# Patient Record
Sex: FEMALE | Race: ASIAN | NOT HISPANIC OR LATINO | Employment: UNEMPLOYED | URBAN - METROPOLITAN AREA
[De-identification: names, ages, dates, MRNs, and addresses within clinical notes are randomized per-mention and may not be internally consistent; named-entity substitution may affect disease eponyms.]

---

## 2017-01-06 ENCOUNTER — PRENATAL OFFICE VISIT - HEALTHEAST (OUTPATIENT)
Dept: FAMILY MEDICINE | Facility: CLINIC | Age: 22
End: 2017-01-06

## 2017-01-06 DIAGNOSIS — E05.90 HYPERTHYROIDISM AFFECTING PREGNANCY: ICD-10-CM

## 2017-01-06 DIAGNOSIS — Z34.92 SUPERVISION OF NORMAL PREGNANCY IN SECOND TRIMESTER: ICD-10-CM

## 2017-01-06 DIAGNOSIS — O99.280 HYPERTHYROIDISM AFFECTING PREGNANCY: ICD-10-CM

## 2017-01-12 ENCOUNTER — OFFICE VISIT - HEALTHEAST (OUTPATIENT)
Dept: ENDOCRINOLOGY | Facility: CLINIC | Age: 22
End: 2017-01-12

## 2017-01-12 DIAGNOSIS — E05.90 HYPERTHYROIDISM: ICD-10-CM

## 2017-01-12 ASSESSMENT — MIFFLIN-ST. JEOR: SCORE: 1098.32

## 2017-01-16 ENCOUNTER — AMBULATORY - HEALTHEAST (OUTPATIENT)
Dept: ENDOCRINOLOGY | Facility: CLINIC | Age: 22
End: 2017-01-16

## 2017-01-16 DIAGNOSIS — E05.90 HYPERTHYROIDISM: ICD-10-CM

## 2017-01-27 ENCOUNTER — HOSPITAL ENCOUNTER (OUTPATIENT)
Dept: ULTRASOUND IMAGING | Facility: HOSPITAL | Age: 22
Discharge: HOME | End: 2017-01-27
Attending: FAMILY MEDICINE

## 2017-01-27 DIAGNOSIS — Z34.92 SUPERVISION OF NORMAL PREGNANCY IN SECOND TRIMESTER: ICD-10-CM

## 2017-01-29 ENCOUNTER — COMMUNICATION - HEALTHEAST (OUTPATIENT)
Dept: FAMILY MEDICINE | Facility: CLINIC | Age: 22
End: 2017-01-29

## 2017-01-29 DIAGNOSIS — E05.90 HYPERTHYROIDISM AFFECTING PREGNANCY: ICD-10-CM

## 2017-01-29 DIAGNOSIS — O99.280 HYPERTHYROIDISM AFFECTING PREGNANCY: ICD-10-CM

## 2017-01-29 DIAGNOSIS — O36.8990 FETAL PERICARDIAL EFFUSION AFFECTING MANAGEMENT OF MOTHER: ICD-10-CM

## 2017-02-01 ENCOUNTER — COMMUNICATION - HEALTHEAST (OUTPATIENT)
Dept: FAMILY MEDICINE | Facility: CLINIC | Age: 22
End: 2017-02-01

## 2017-02-01 DIAGNOSIS — E05.90 HYPERTHYROIDISM: ICD-10-CM

## 2017-02-10 ENCOUNTER — PRENATAL OFFICE VISIT - HEALTHEAST (OUTPATIENT)
Dept: FAMILY MEDICINE | Facility: CLINIC | Age: 22
End: 2017-02-10

## 2017-02-10 ENCOUNTER — AMBULATORY - HEALTHEAST (OUTPATIENT)
Dept: LAB | Facility: CLINIC | Age: 22
End: 2017-02-10

## 2017-02-10 DIAGNOSIS — O99.280 HYPERTHYROIDISM AFFECTING PREGNANCY: ICD-10-CM

## 2017-02-10 DIAGNOSIS — E05.90 HYPERTHYROIDISM: ICD-10-CM

## 2017-02-10 DIAGNOSIS — Z34.92 SUPERVISION OF NORMAL PREGNANCY IN SECOND TRIMESTER: ICD-10-CM

## 2017-02-10 DIAGNOSIS — R12 HEART BURN: ICD-10-CM

## 2017-02-10 DIAGNOSIS — E05.90 HYPERTHYROIDISM AFFECTING PREGNANCY: ICD-10-CM

## 2017-02-14 ENCOUNTER — COMMUNICATION - HEALTHEAST (OUTPATIENT)
Dept: ENDOCRINOLOGY | Facility: CLINIC | Age: 22
End: 2017-02-14

## 2017-02-15 ENCOUNTER — COMMUNICATION - HEALTHEAST (OUTPATIENT)
Dept: FAMILY MEDICINE | Facility: CLINIC | Age: 22
End: 2017-02-15

## 2017-02-15 ENCOUNTER — RECORDS - HEALTHEAST (OUTPATIENT)
Dept: ADMINISTRATIVE | Facility: OTHER | Age: 22
End: 2017-02-15

## 2017-03-07 ENCOUNTER — COMMUNICATION - HEALTHEAST (OUTPATIENT)
Dept: FAMILY MEDICINE | Facility: CLINIC | Age: 22
End: 2017-03-07

## 2017-03-07 ENCOUNTER — PRENATAL OFFICE VISIT - HEALTHEAST (OUTPATIENT)
Dept: FAMILY MEDICINE | Facility: CLINIC | Age: 22
End: 2017-03-07

## 2017-03-07 DIAGNOSIS — E05.90 HYPERTHYROIDISM: ICD-10-CM

## 2017-03-07 DIAGNOSIS — Z34.92 SUPERVISION OF NORMAL PREGNANCY IN SECOND TRIMESTER: ICD-10-CM

## 2017-03-14 ENCOUNTER — COMMUNICATION - HEALTHEAST (OUTPATIENT)
Dept: FAMILY MEDICINE | Facility: CLINIC | Age: 22
End: 2017-03-14

## 2017-04-07 ENCOUNTER — PRENATAL OFFICE VISIT - HEALTHEAST (OUTPATIENT)
Dept: FAMILY MEDICINE | Facility: CLINIC | Age: 22
End: 2017-04-07

## 2017-04-07 DIAGNOSIS — Z34.93 SUPERVISION OF NORMAL PREGNANCY IN THIRD TRIMESTER: ICD-10-CM

## 2017-04-07 DIAGNOSIS — E05.90 HYPERTHYROIDISM: ICD-10-CM

## 2017-04-07 DIAGNOSIS — R12 HEART BURN: ICD-10-CM

## 2017-04-07 DIAGNOSIS — O99.280 HYPERTHYROIDISM AFFECTING PREGNANCY: ICD-10-CM

## 2017-04-07 DIAGNOSIS — E05.90 HYPERTHYROIDISM AFFECTING PREGNANCY: ICD-10-CM

## 2017-04-10 LAB — SYPHILIS RPR SCREEN - HISTORICAL: NORMAL

## 2017-04-12 ENCOUNTER — COMMUNICATION - HEALTHEAST (OUTPATIENT)
Dept: ENDOCRINOLOGY | Facility: CLINIC | Age: 22
End: 2017-04-12

## 2017-04-18 ENCOUNTER — AMBULATORY - HEALTHEAST (OUTPATIENT)
Dept: LAB | Facility: CLINIC | Age: 22
End: 2017-04-18

## 2017-04-18 ENCOUNTER — COMMUNICATION - HEALTHEAST (OUTPATIENT)
Dept: FAMILY MEDICINE | Facility: CLINIC | Age: 22
End: 2017-04-18

## 2017-04-18 DIAGNOSIS — Z34.93 SUPERVISION OF NORMAL PREGNANCY IN THIRD TRIMESTER: ICD-10-CM

## 2017-05-15 ENCOUNTER — COMMUNICATION - HEALTHEAST (OUTPATIENT)
Dept: FAMILY MEDICINE | Facility: CLINIC | Age: 22
End: 2017-05-15

## 2017-05-20 ENCOUNTER — HOSPITAL ENCOUNTER (OUTPATIENT)
Dept: OBGYN | Facility: HOSPITAL | Age: 22
Discharge: HOME | End: 2017-05-21
Attending: FAMILY MEDICINE | Admitting: FAMILY MEDICINE

## 2017-05-20 ENCOUNTER — COMMUNICATION - HEALTHEAST (OUTPATIENT)
Dept: SCHEDULING | Facility: CLINIC | Age: 22
End: 2017-05-20

## 2017-05-20 ASSESSMENT — MIFFLIN-ST. JEOR
SCORE: 1135.52
SCORE: 1135.52

## 2017-05-21 LAB — SYPHILIS RPR SCREEN - HISTORICAL: NORMAL

## 2017-06-29 ENCOUNTER — OFFICE VISIT - HEALTHEAST (OUTPATIENT)
Dept: ENDOCRINOLOGY | Facility: CLINIC | Age: 22
End: 2017-06-29

## 2017-06-29 DIAGNOSIS — E05.90 HYPERTHYROIDISM: ICD-10-CM

## 2017-06-29 ASSESSMENT — MIFFLIN-ST. JEOR: SCORE: 1111.93

## 2017-07-05 ENCOUNTER — COMMUNICATION - HEALTHEAST (OUTPATIENT)
Dept: ENDOCRINOLOGY | Facility: CLINIC | Age: 22
End: 2017-07-05

## 2017-07-05 DIAGNOSIS — E05.90 HYPERTHYROIDISM: ICD-10-CM

## 2017-07-10 ENCOUNTER — COMMUNICATION - HEALTHEAST (OUTPATIENT)
Dept: ENDOCRINOLOGY | Facility: CLINIC | Age: 22
End: 2017-07-10

## 2017-07-10 DIAGNOSIS — E05.90 HYPERTHYROIDISM: ICD-10-CM

## 2017-09-26 ENCOUNTER — COMMUNICATION - HEALTHEAST (OUTPATIENT)
Dept: ENDOCRINOLOGY | Facility: CLINIC | Age: 22
End: 2017-09-26

## 2017-09-26 DIAGNOSIS — E05.90 HYPERTHYROIDISM: ICD-10-CM

## 2017-10-06 ENCOUNTER — OFFICE VISIT - HEALTHEAST (OUTPATIENT)
Dept: FAMILY MEDICINE | Facility: CLINIC | Age: 22
End: 2017-10-06

## 2017-10-06 ENCOUNTER — COMMUNICATION - HEALTHEAST (OUTPATIENT)
Dept: FAMILY MEDICINE | Facility: CLINIC | Age: 22
End: 2017-10-06

## 2017-10-06 ENCOUNTER — AMBULATORY - HEALTHEAST (OUTPATIENT)
Dept: FAMILY MEDICINE | Facility: CLINIC | Age: 22
End: 2017-10-06

## 2017-10-06 DIAGNOSIS — Z30.017 NEXPLANON INSERTION: ICD-10-CM

## 2017-10-06 DIAGNOSIS — E05.00 GRAVES DISEASE: ICD-10-CM

## 2017-12-22 ENCOUNTER — COMMUNICATION - HEALTHEAST (OUTPATIENT)
Dept: FAMILY MEDICINE | Facility: CLINIC | Age: 22
End: 2017-12-22

## 2017-12-22 ENCOUNTER — AMBULATORY - HEALTHEAST (OUTPATIENT)
Dept: LAB | Facility: CLINIC | Age: 22
End: 2017-12-22

## 2017-12-22 DIAGNOSIS — E05.90 HYPERTHYROIDISM: ICD-10-CM

## 2017-12-22 DIAGNOSIS — E05.00 GRAVES DISEASE: ICD-10-CM

## 2017-12-22 DIAGNOSIS — R00.0 TACHYCARDIA: ICD-10-CM

## 2017-12-26 ENCOUNTER — COMMUNICATION - HEALTHEAST (OUTPATIENT)
Dept: ENDOCRINOLOGY | Facility: CLINIC | Age: 22
End: 2017-12-26

## 2017-12-26 DIAGNOSIS — E05.90 HYPERTHYROIDISM: ICD-10-CM

## 2018-02-16 ENCOUNTER — AMBULATORY - HEALTHEAST (OUTPATIENT)
Dept: ENDOCRINOLOGY | Facility: CLINIC | Age: 23
End: 2018-02-16

## 2018-02-16 ENCOUNTER — OFFICE VISIT - HEALTHEAST (OUTPATIENT)
Dept: ENDOCRINOLOGY | Facility: CLINIC | Age: 23
End: 2018-02-16

## 2018-02-16 DIAGNOSIS — E05.00 GRAVES DISEASE: ICD-10-CM

## 2018-02-16 DIAGNOSIS — E05.90 HYPERTHYROIDISM: ICD-10-CM

## 2018-02-16 LAB
T3 SERPL-MCNC: 236 NG/DL (ref 45–175)
T4 FREE SERPL-MCNC: 1.2 NG/DL (ref 0.7–1.8)
TSH SERPL DL<=0.005 MIU/L-ACNC: <0.01 UIU/ML (ref 0.3–5)

## 2018-02-16 ASSESSMENT — MIFFLIN-ST. JEOR: SCORE: 1097.87

## 2018-02-19 ENCOUNTER — AMBULATORY - HEALTHEAST (OUTPATIENT)
Dept: ENDOCRINOLOGY | Facility: CLINIC | Age: 23
End: 2018-02-19

## 2018-02-19 DIAGNOSIS — E05.00 GRAVES DISEASE: ICD-10-CM

## 2018-02-21 ENCOUNTER — COMMUNICATION - HEALTHEAST (OUTPATIENT)
Dept: ENDOCRINOLOGY | Facility: CLINIC | Age: 23
End: 2018-02-21

## 2018-03-01 ENCOUNTER — COMMUNICATION - HEALTHEAST (OUTPATIENT)
Dept: ENDOCRINOLOGY | Facility: CLINIC | Age: 23
End: 2018-03-01

## 2018-03-10 ENCOUNTER — COMMUNICATION - HEALTHEAST (OUTPATIENT)
Dept: FAMILY MEDICINE | Facility: CLINIC | Age: 23
End: 2018-03-10

## 2018-03-10 DIAGNOSIS — E05.00 GRAVES DISEASE: ICD-10-CM

## 2018-03-10 DIAGNOSIS — E05.90 HYPERTHYROIDISM: ICD-10-CM

## 2018-03-15 ENCOUNTER — OFFICE VISIT - HEALTHEAST (OUTPATIENT)
Dept: SURGERY | Facility: CLINIC | Age: 23
End: 2018-03-15

## 2018-03-15 DIAGNOSIS — E05.90 HYPERTHYROIDISM: ICD-10-CM

## 2018-03-15 ASSESSMENT — MIFFLIN-ST. JEOR: SCORE: 1119.64

## 2018-04-17 ENCOUNTER — COMMUNICATION - HEALTHEAST (OUTPATIENT)
Dept: FAMILY MEDICINE | Facility: CLINIC | Age: 23
End: 2018-04-17

## 2018-04-17 DIAGNOSIS — E05.90 HYPERTHYROIDISM: ICD-10-CM

## 2018-05-29 ENCOUNTER — COMMUNICATION - HEALTHEAST (OUTPATIENT)
Dept: FAMILY MEDICINE | Facility: CLINIC | Age: 23
End: 2018-05-29

## 2018-05-29 DIAGNOSIS — E05.90 HYPERTHYROIDISM: ICD-10-CM

## 2018-08-09 ENCOUNTER — COMMUNICATION - HEALTHEAST (OUTPATIENT)
Dept: ENDOCRINOLOGY | Facility: CLINIC | Age: 23
End: 2018-08-09

## 2018-08-16 ENCOUNTER — OFFICE VISIT - HEALTHEAST (OUTPATIENT)
Dept: ENDOCRINOLOGY | Facility: CLINIC | Age: 23
End: 2018-08-16

## 2018-08-16 ENCOUNTER — AMBULATORY - HEALTHEAST (OUTPATIENT)
Dept: ENDOCRINOLOGY | Facility: CLINIC | Age: 23
End: 2018-08-16

## 2018-08-16 DIAGNOSIS — E05.90 HYPERTHYROIDISM: ICD-10-CM

## 2018-08-16 LAB
T3 SERPL-MCNC: 208 NG/DL (ref 45–175)
T4 FREE SERPL-MCNC: 0.6 NG/DL (ref 0.7–1.8)
TSH SERPL DL<=0.005 MIU/L-ACNC: 0.05 UIU/ML (ref 0.3–5)

## 2018-08-16 ASSESSMENT — MIFFLIN-ST. JEOR: SCORE: 1140.51

## 2018-08-20 ENCOUNTER — COMMUNICATION - HEALTHEAST (OUTPATIENT)
Dept: ENDOCRINOLOGY | Facility: CLINIC | Age: 23
End: 2018-08-20

## 2018-08-20 DIAGNOSIS — E05.90 HYPERTHYROIDISM: ICD-10-CM

## 2018-08-28 ENCOUNTER — OFFICE VISIT - HEALTHEAST (OUTPATIENT)
Dept: SURGERY | Facility: CLINIC | Age: 23
End: 2018-08-28

## 2018-08-28 ENCOUNTER — AMBULATORY - HEALTHEAST (OUTPATIENT)
Dept: LAB | Facility: CLINIC | Age: 23
End: 2018-08-28

## 2018-08-28 DIAGNOSIS — E05.90 HYPERTHYROIDISM: ICD-10-CM

## 2018-08-28 LAB
CREAT SERPL-MCNC: 0.55 MG/DL (ref 0.6–1.1)
GFR SERPL CREATININE-BSD FRML MDRD: >60 ML/MIN/1.73M2
POTASSIUM BLD-SCNC: 4.3 MMOL/L (ref 3.5–5)
TSH SERPL DL<=0.005 MIU/L-ACNC: <0.01 UIU/ML (ref 0.3–5)

## 2018-08-28 ASSESSMENT — MIFFLIN-ST. JEOR: SCORE: 1135.52

## 2018-09-04 ENCOUNTER — COMMUNICATION - HEALTHEAST (OUTPATIENT)
Dept: ENDOCRINOLOGY | Facility: CLINIC | Age: 23
End: 2018-09-04

## 2018-09-20 ENCOUNTER — COMMUNICATION - HEALTHEAST (OUTPATIENT)
Dept: SURGERY | Facility: CLINIC | Age: 23
End: 2018-09-20

## 2018-12-10 ENCOUNTER — COMMUNICATION - HEALTHEAST (OUTPATIENT)
Dept: FAMILY MEDICINE | Facility: CLINIC | Age: 23
End: 2018-12-10

## 2018-12-10 DIAGNOSIS — E05.90 HYPERTHYROIDISM: ICD-10-CM

## 2019-01-17 ENCOUNTER — AMBULATORY - HEALTHEAST (OUTPATIENT)
Dept: ENDOCRINOLOGY | Facility: CLINIC | Age: 24
End: 2019-01-17

## 2019-01-17 DIAGNOSIS — E05.90 HYPERTHYROIDISM: ICD-10-CM

## 2019-02-08 ENCOUNTER — AMBULATORY - HEALTHEAST (OUTPATIENT)
Dept: LAB | Facility: CLINIC | Age: 24
End: 2019-02-08

## 2019-02-08 DIAGNOSIS — E05.90 HYPERTHYROIDISM: ICD-10-CM

## 2019-02-08 LAB
T3 SERPL-MCNC: 364 NG/DL (ref 45–175)
T4 FREE SERPL-MCNC: 2 NG/DL (ref 0.7–1.8)
TSH SERPL DL<=0.005 MIU/L-ACNC: <0.01 UIU/ML (ref 0.3–5)

## 2019-02-18 ENCOUNTER — OFFICE VISIT - HEALTHEAST (OUTPATIENT)
Dept: ENDOCRINOLOGY | Facility: CLINIC | Age: 24
End: 2019-02-18

## 2019-02-18 DIAGNOSIS — E05.90 HYPERTHYROIDISM: ICD-10-CM

## 2019-02-18 LAB
HCG UR QL: NEGATIVE
SP GR UR STRIP: 1.01 (ref 1–1.03)

## 2019-02-18 ASSESSMENT — MIFFLIN-ST. JEOR: SCORE: 1116.47

## 2019-02-27 ENCOUNTER — COMMUNICATION - HEALTHEAST (OUTPATIENT)
Dept: ENDOCRINOLOGY | Facility: CLINIC | Age: 24
End: 2019-02-27

## 2019-03-15 ENCOUNTER — COMMUNICATION - HEALTHEAST (OUTPATIENT)
Dept: TELEHEALTH | Facility: CLINIC | Age: 24
End: 2019-03-15

## 2019-03-18 ENCOUNTER — COMMUNICATION - HEALTHEAST (OUTPATIENT)
Dept: TELEHEALTH | Facility: CLINIC | Age: 24
End: 2019-03-18

## 2019-03-18 ENCOUNTER — COMMUNICATION - HEALTHEAST (OUTPATIENT)
Dept: ENDOCRINOLOGY | Facility: CLINIC | Age: 24
End: 2019-03-18

## 2019-04-03 ENCOUNTER — HOSPITAL ENCOUNTER (OUTPATIENT)
Dept: NUCLEAR MEDICINE | Facility: HOSPITAL | Age: 24
Discharge: HOME | End: 2019-04-03
Attending: INTERNAL MEDICINE

## 2019-04-03 DIAGNOSIS — E05.90 HYPERTHYROIDISM: ICD-10-CM

## 2019-04-03 ASSESSMENT — MIFFLIN-ST. JEOR: SCORE: 1117.37

## 2019-04-04 ENCOUNTER — AMBULATORY - HEALTHEAST (OUTPATIENT)
Dept: ENDOCRINOLOGY | Facility: CLINIC | Age: 24
End: 2019-04-04

## 2019-04-04 ENCOUNTER — HOSPITAL ENCOUNTER (OUTPATIENT)
Dept: NUCLEAR MEDICINE | Facility: HOSPITAL | Age: 24
Discharge: HOME | End: 2019-04-04
Attending: INTERNAL MEDICINE

## 2019-04-04 DIAGNOSIS — E05.90 HYPERTHYROIDISM: ICD-10-CM

## 2019-04-05 ENCOUNTER — AMBULATORY - HEALTHEAST (OUTPATIENT)
Dept: LAB | Facility: HOSPITAL | Age: 24
End: 2019-04-05

## 2019-04-05 ENCOUNTER — HOSPITAL ENCOUNTER (OUTPATIENT)
Dept: NUCLEAR MEDICINE | Facility: HOSPITAL | Age: 24
Discharge: HOME | End: 2019-04-05
Attending: INTERNAL MEDICINE

## 2019-04-05 DIAGNOSIS — E05.90 HYPERTHYROIDISM: ICD-10-CM

## 2019-04-05 LAB — HCG SERPL QL: NEGATIVE

## 2019-04-08 ENCOUNTER — AMBULATORY - HEALTHEAST (OUTPATIENT)
Dept: ENDOCRINOLOGY | Facility: CLINIC | Age: 24
End: 2019-04-08

## 2019-04-08 DIAGNOSIS — E05.90 HYPERTHYROIDISM: ICD-10-CM

## 2019-04-17 ENCOUNTER — COMMUNICATION - HEALTHEAST (OUTPATIENT)
Dept: ENDOCRINOLOGY | Facility: CLINIC | Age: 24
End: 2019-04-17

## 2019-05-01 ENCOUNTER — HOSPITAL ENCOUNTER (OUTPATIENT)
Dept: LAB | Age: 24
Setting detail: SPECIMEN
Discharge: HOME | End: 2019-05-01

## 2019-05-01 ENCOUNTER — AMBULATORY - HEALTHEAST (OUTPATIENT)
Dept: LAB | Facility: CLINIC | Age: 24
End: 2019-05-01

## 2019-05-01 DIAGNOSIS — E05.90 HYPERTHYROIDISM: ICD-10-CM

## 2019-05-01 LAB — T4 FREE SERPL-MCNC: 2.9 NG/DL (ref 0.7–1.8)

## 2019-05-02 ENCOUNTER — COMMUNICATION - HEALTHEAST (OUTPATIENT)
Dept: ENDOCRINOLOGY | Facility: CLINIC | Age: 24
End: 2019-05-02

## 2019-05-02 DIAGNOSIS — E05.90 HYPERTHYROIDISM: ICD-10-CM

## 2019-05-02 RX ORDER — METHIMAZOLE 10 MG/1
30 TABLET ORAL DAILY
Qty: 270 TABLET | Refills: 0 | Status: SHIPPED | OUTPATIENT
Start: 2019-05-02 | End: 2021-11-18

## 2019-07-26 ENCOUNTER — COMMUNICATION - HEALTHEAST (OUTPATIENT)
Dept: ENDOCRINOLOGY | Facility: CLINIC | Age: 24
End: 2019-07-26

## 2019-08-05 ENCOUNTER — COMMUNICATION - HEALTHEAST (OUTPATIENT)
Dept: FAMILY MEDICINE | Facility: CLINIC | Age: 24
End: 2019-08-05

## 2020-09-16 ENCOUNTER — OFFICE VISIT - HEALTHEAST (OUTPATIENT)
Dept: FAMILY MEDICINE | Facility: CLINIC | Age: 25
End: 2020-09-16

## 2020-09-16 DIAGNOSIS — Z30.46 NEXPLANON REMOVAL: ICD-10-CM

## 2020-09-16 DIAGNOSIS — E05.00 GRAVES DISEASE: ICD-10-CM

## 2020-09-16 LAB
ALBUMIN SERPL-MCNC: 3.7 G/DL (ref 3.5–5)
ALP SERPL-CCNC: 201 U/L (ref 45–120)
ALT SERPL W P-5'-P-CCNC: 17 U/L (ref 0–45)
ANION GAP SERPL CALCULATED.3IONS-SCNC: 7 MMOL/L (ref 5–18)
AST SERPL W P-5'-P-CCNC: 18 U/L (ref 0–40)
BILIRUB SERPL-MCNC: 0.6 MG/DL (ref 0–1)
BUN SERPL-MCNC: 7 MG/DL (ref 8–22)
CALCIUM SERPL-MCNC: 9 MG/DL (ref 8.5–10.5)
CHLORIDE BLD-SCNC: 111 MMOL/L (ref 98–107)
CO2 SERPL-SCNC: 24 MMOL/L (ref 22–31)
CREAT SERPL-MCNC: 0.47 MG/DL (ref 0.6–1.1)
ERYTHROCYTE [DISTWIDTH] IN BLOOD BY AUTOMATED COUNT: 10.6 % (ref 11–14.5)
GFR SERPL CREATININE-BSD FRML MDRD: >60 ML/MIN/1.73M2
GLUCOSE BLD-MCNC: 101 MG/DL (ref 70–125)
HCT VFR BLD AUTO: 36.3 % (ref 35–47)
HGB BLD-MCNC: 11.8 G/DL (ref 12–16)
MCH RBC QN AUTO: 26.8 PG (ref 27–34)
MCHC RBC AUTO-ENTMCNC: 32.4 G/DL (ref 32–36)
MCV RBC AUTO: 83 FL (ref 80–100)
PLATELET # BLD AUTO: 168 THOU/UL (ref 140–440)
PMV BLD AUTO: 7.8 FL (ref 7–10)
POTASSIUM BLD-SCNC: 3.6 MMOL/L (ref 3.5–5)
PROT SERPL-MCNC: 6.5 G/DL (ref 6–8)
RBC # BLD AUTO: 4.39 MILL/UL (ref 3.8–5.4)
SODIUM SERPL-SCNC: 142 MMOL/L (ref 136–145)
T4 FREE SERPL-MCNC: 2.6 NG/DL (ref 0.7–1.8)
TSH SERPL DL<=0.005 MIU/L-ACNC: <0.01 UIU/ML (ref 0.3–5)
WBC: 4.2 THOU/UL (ref 4–11)

## 2020-09-16 RX ORDER — METOPROLOL SUCCINATE 25 MG/1
25 TABLET, EXTENDED RELEASE ORAL DAILY
Qty: 60 TABLET | Refills: 0 | Status: SHIPPED | OUTPATIENT
Start: 2020-09-16 | End: 2022-01-05

## 2020-09-16 ASSESSMENT — MIFFLIN-ST. JEOR: SCORE: 1090.71

## 2020-09-17 RX ORDER — PRENATAL VIT/IRON FUM/FOLIC AC 27MG-0.8MG
1 TABLET ORAL DAILY
Qty: 90 TABLET | Refills: 3 | Status: SHIPPED | OUTPATIENT
Start: 2020-09-17 | End: 2021-11-16

## 2020-10-31 ENCOUNTER — COMMUNICATION - HEALTHEAST (OUTPATIENT)
Dept: FAMILY MEDICINE | Facility: CLINIC | Age: 25
End: 2020-10-31

## 2021-01-14 ENCOUNTER — TRANSCRIBE ORDERS (OUTPATIENT)
Dept: OTHER | Age: 26
End: 2021-01-14

## 2021-01-14 DIAGNOSIS — E05.00 GRAVES DISEASE: Primary | ICD-10-CM

## 2021-02-01 ENCOUNTER — COMMUNICATION - HEALTHEAST (OUTPATIENT)
Dept: FAMILY MEDICINE | Facility: CLINIC | Age: 26
End: 2021-02-01

## 2021-05-28 NOTE — TELEPHONE ENCOUNTER
----- Message from Kuldip Sharma MD sent at 5/2/2019  8:46 AM CDT -----  Please restart tapazole 30 mg daily. Check tsh ft4 tt3 in 8 weeks.

## 2021-05-30 VITALS — WEIGHT: 108.75 LBS | BODY MASS INDEX: 23.53 KG/M2

## 2021-05-30 VITALS — BODY MASS INDEX: 23.04 KG/M2 | WEIGHT: 106.8 LBS | HEIGHT: 57 IN

## 2021-05-30 VITALS — WEIGHT: 111 LBS | BODY MASS INDEX: 24.02 KG/M2

## 2021-05-30 VITALS — BODY MASS INDEX: 22.29 KG/M2 | WEIGHT: 103 LBS

## 2021-05-30 VITALS — WEIGHT: 112 LBS | BODY MASS INDEX: 24.24 KG/M2

## 2021-05-31 VITALS — WEIGHT: 115 LBS | HEIGHT: 57 IN | BODY MASS INDEX: 24.81 KG/M2

## 2021-05-31 VITALS — BODY MASS INDEX: 23.69 KG/M2 | HEIGHT: 57 IN | WEIGHT: 109.8 LBS

## 2021-05-31 VITALS — WEIGHT: 95.75 LBS | BODY MASS INDEX: 20.72 KG/M2

## 2021-06-01 VITALS — BODY MASS INDEX: 23.02 KG/M2 | WEIGHT: 106.7 LBS | HEIGHT: 57 IN

## 2021-06-01 VITALS — WEIGHT: 115 LBS | HEIGHT: 57 IN | BODY MASS INDEX: 24.81 KG/M2

## 2021-06-01 VITALS — BODY MASS INDEX: 23.9 KG/M2 | WEIGHT: 110.8 LBS | HEIGHT: 57 IN

## 2021-06-01 VITALS — BODY MASS INDEX: 25.05 KG/M2 | HEIGHT: 57 IN | WEIGHT: 116.1 LBS

## 2021-06-02 VITALS — BODY MASS INDEX: 23.9 KG/M2 | HEIGHT: 57 IN | WEIGHT: 110.8 LBS

## 2021-06-02 VITALS — HEIGHT: 57 IN | BODY MASS INDEX: 23.95 KG/M2 | WEIGHT: 111 LBS

## 2021-06-05 VITALS
SYSTOLIC BLOOD PRESSURE: 143 MMHG | HEART RATE: 125 BPM | WEIGHT: 104.25 LBS | DIASTOLIC BLOOD PRESSURE: 88 MMHG | HEIGHT: 57 IN | BODY MASS INDEX: 22.49 KG/M2

## 2021-06-08 NOTE — PROGRESS NOTES
Progress Note    Reason for Visit:  Chief Complaint     Hypothyroidism          Progress Note:    HPI:   This pleasant 21-year-old female patient is seeing physician at the request of  because of hyperthyroidism.  The patient is 7 months postpartum she has 2 children but she also currently pregnant at 18 weeks.  She was on Tapazole before 15 mg      The patient is noncompliant with her medication she discontinued that and then she became pregnant she was not taking any treatment for the first trimester and 1 month ago she restarted the Tapazole at 15 mg.    She is markedly hyperthyroid TSH undetectable 3-4 3. 6-3 she hasn't enlarged goiter 2-3 times up on limits of normal.  Vital signs blood pressure 124/60 pulse is 9099.  Review of Systems:    Nervous System: No headache, dizziness, fainting or memory loss. No tingling sensation of hand or feet.  Ears: No hearing loss or ringing in the ears  Eyes: No blurring of vision, redness, itching or dryness.  Nose: No nosebleed or loss of smell  Mouth: No mouth sores or loss of taste  Throat: No hoarseness or difficulty swallowing  Neck: No enlarged thyroid or lymph nodes.  Heart: No chest pain, palpitation or irregular heartbeat. No swelling of hands or feet  Lungs: No shortness of breath, cough, night sweats, wheezing or hemoptysis.  Gastrointestinal: No nausea or vomiting, constipation or diarrhea.  No acid reflux, abdominal pain or blood in stools.  Kidney/Bladdr: No polyuria, polydipsia, nocturia or hematuria.  Genital/Sexual: No loss of libido  Skin: No rash, hair loss or hirsutism.  No abnormal striae  Muscles/Joints/Bones: No morning stiffness, muscle aches and pain or loss of height.    Current Medications:  Current Outpatient Prescriptions   Medication Sig     atenolol (TENORMIN) 25 MG tablet Take 1 tablet (25 mg total) by mouth daily.     methIMAzole (TAPAZOLE) 5 MG tablet Take 3 tablets (15 mg total) by mouth 3 (three) times a day.     prenatal vitamin  "iron-folic acid 27mg-0.8mg (PRENATAL S) 27 mg iron- 800 mcg Tab tablet TAKE 1 TABLET BY MOUTH ONCE DAILY.       Patients Active Problems:  Patient Active Problem List   Diagnosis     Tachycardia     Hepatitis A vaccination not up to date     Hyperthyroidism     Not immune to hepatitis B virus     Susceptible to varicella (non-immune), currently pregnant     Rubella non-immune status, antepartum     Normal pregnancy     Hyperthyroidism affecting pregnancy       History:   reports that she has never smoked. She does not have any smokeless tobacco history on file. She reports that she does not drink alcohol or use illicit drugs.   reports that she has never smoked. She does not have any smokeless tobacco history on file. She reports that she does not drink alcohol or use illicit drugs.  History   Smoking Status     Never Smoker   Smokeless Tobacco     Not on file      reports that she has never smoked. She does not have any smokeless tobacco history on file. She reports that she does not drink alcohol or use illicit drugs.  History   Sexual Activity     Sexual activity: Yes     Partners: Male     Birth control/ protection: None     Past Medical History   Diagnosis Date     Hyperthyroidism 2016     Intrauterine Growth Restriction      Precipitous delivery, delivered (current hospitalization) 2016      delivery 2016     Urinary tract infection      Family History   Problem Relation Age of Onset     No Medical Problems Father      Thyroid disease Mother      Mental retardation Brother      Past Medical History   Diagnosis Date     Hyperthyroidism 2016     Intrauterine Growth Restriction      Precipitous delivery, delivered (current hospitalization) 2016      delivery 2016     Urinary tract infection      No past surgical history on file.    Vitals   height is 4' 9\" (1.448 m) and weight is 106 lb 12.8 oz (48.4 kg). Her blood pressure is 124/60.         Exam  General appearance: " The patient looked well, not in acute distress.  Eyes: no evidence of thyroid eye disease.   Retinal exam: No evidence of diabetic retinopathy.  Mouth and Throat: Normal  Neck: No evidence of thyromegaly, enlarged lymph node or tenderness  Chest: Trachea is central. Chest is clear to auscultation and percussion. Breat sounds are normal.  Cardiovascular exam: JVP is not raised. Heart sounds are normal, no murmurs or rub  Peripheral pulses are palpable.   Abdomen: No masses or tenderness.    Back: No vertebral tenderness or kyphosis.  Extremities: No evidence of leg edema.   Skin: Normal to touch.  No abnormal striae  Neurologic exam:  Visual fields are intact by confrontation, grossly intact. No evidence of peripheral neuropathy.  Detailed foot exam normal.        Diagnosis:  No diagnosis found.    Orders:   No orders of the defined types were placed in this encounter.        Assessment and Plan:  Hyperthyroidism due to pregnancy and have discussed the present dosage of the diseases the patient we will continue on atenolol 25 mg once a day.  We will increase Tapazole to, 20 mg we'll check thyroid function tests a very 4 weeks patient will return to clinic in 3 months I discussed side effects with the patient.    I did spend 45 minutes with the patient more than 50% was spent in counseling and managing her care.

## 2021-06-08 NOTE — PROGRESS NOTES
No ctx, lof, bleeding, or dc.  No HA or vision changes.  Now taking methimazole and atenolol.  Good weight gain.  Due for labs for endocrinology, today, and they were drawn.  Has appointment with [facility] next week regarding pericardial effusion of fetus.  No constipation,  Feels heart rate is better.  Denies feeling sweaty, overheated, or having diarrhea.  If anything she is more constipated.  She declined pap smear, today.

## 2021-06-08 NOTE — PROGRESS NOTES
No ctx, lof, bleeding, or dc.  No HA or vision changes.   Still tachycardic. Taking atenolol.  Taking tapazole.  Has not seen Endocrine.  Feels ok.  No ctx, lof, bleeding, or dc.  No HA or vision changes.    Hyperthyroidism affecting pregnancy  - Thyroid Stimulating Hormone (TSH)  - T4, Free    Patient notified

## 2021-06-09 NOTE — PROGRESS NOTES
"Having occasional non painful contractions.  No lof, bleeding, or dc.  No HA or vision changes.     Taking methimazole daily, 25 mg.  Has been able to cut atenolol in half, down to 12.5 mg daily.  Still feels anxious and crabby.  Still feels heart rate is fast.    Fundal height growth is less than expected.  Most recent delivery at 35 weeks of infant weighing 1.79 kg.  Mother with fairly small stature (4'9\", has gained 14 lbs since week 12 of pregnancy to 112 lb.)  Referral back to [facility] for ultrasound (growth evaluation, sonographic goiter assessment, and consult).  Discussed importance of this follow up.    Still having heart burn despite TUMS.  Will add ranitidine.    Thyroid studies today.  CBC and ferritin.  RPR.  "

## 2021-06-09 NOTE — PROGRESS NOTES
No ctx, lof, bleeding, or dc.  No HA or vision changes.   Reviewed notes and recommendations from perinatology.    Encouraged her to consider tapering off the atenolol, however heart rate is still relatively high and weight gain has not been very significant with her pregnancy.  We'll continue to evaluate the fetus for growth restriction--as well as maternal status.    She has not yet refilled the methimazole prescriptions but reports knowing that she should have increased the dose to 25 mg per day (per endocrinology).  I renewed her prescription so she doesn't run out.  Plans to have labs drawn on March 24.

## 2021-06-10 NOTE — PROGRESS NOTES
"In to see Yusuf.  She states her contractions are \"getting strong\".  EFM applied.  Plan to observe and check in at 0100.    "

## 2021-06-10 NOTE — PROGRESS NOTES
"In to see Yusuf.  She states she is not having contractions anymore and has been sleeping.  I ask her if she would feel comfortable going home and she states \"yes\".  Plan to call Cindy for discharge home order around 0600.    "

## 2021-06-10 NOTE — PROGRESS NOTES
Yusuf and I discuss her discharge instructions.  Yusuf verbalizes understanding of all discharge instructions.  Report to Wen Martínez RN who will be walking Yusuf and her  out when she is dressed and ready to go.

## 2021-06-10 NOTE — PROGRESS NOTES
"Yusuf and I discuss plan of care.  We decide to let her try and rest.  Yusuf states she feels like she could rest as \"my contractions are still the same\".  Plan to hang 0300 dose of penicillin and let her rest.  Plan to call Cindy in the morning.   "

## 2021-06-10 NOTE — PROGRESS NOTES
EFM discontinued.  Yusuf states some contractions are stronger than others but doesn't really feel a difference.  Yusuf appears comfortable and relaxed and I inform her to call me if her contractions get more consistently strong.  Will observe.

## 2021-06-11 NOTE — PROGRESS NOTES
"Subjective:  25 y.o. female with concerns of follow-up and removal of Nexplanon.  Insertion date October 2017.  Nearing the end of its life span.  Patient requests removal.  She does not want to go back on birth control at all.  She would consider another pregnancy but is worried about her Graves' disease.    Graves' disease with some symptoms.  She has not followed up with endocrinology with primary care for over a year.  She has been off methimazole.  She had been on it with alternating levels of medication adherence.      Outpatient Medications Prior to Visit   Medication Sig Dispense Refill     methIMAzole (TAPAZOLE) 10 MG tablet Take 3 tablets (30 mg total) by mouth daily. 270 tablet 0     white petrolatum-mineral oil (REFRESH LACRI-LUBE) 56.8-42.5 % Oint Apply to eyelid/eye at night  0     Facility-Administered Medications Prior to Visit   Medication Dose Route Frequency Provider Last Rate Last Dose     etonogestrel 68 mg implant 1 each (NEXPLANON)  1 each Subdermal Once [place], [facility]          Social History     Tobacco Use   Smoking Status Current Some Day Smoker     Types: Cigarettes   Smokeless Tobacco Never Used      Objective:  /88 (Patient Site: Right Arm, Patient Position: Sitting, Cuff Size: Adult Small)   Pulse (!) 125   Ht 4' 9.25\" (1.454 m)   Wt 104 lb 4 oz (47.3 kg)   LMP 09/14/2020   BMI 22.36 kg/m    GENERAL: alert, not distressed  EYE: Mild proptosis  CHEST: clear, no rales, rhonchi, or wheezes  CARDIAC: regular without murmur, gallop, or rub  ABDOMEN: soft, non tender, non distended, normal bowel sounds    Recent Results (from the past 240 hour(s))   Comprehensive Metabolic Panel    Collection Time: 09/16/20 12:47 PM   Result Value Ref Range    Sodium 142 136 - 145 mmol/L    Potassium 3.6 3.5 - 5.0 mmol/L    Chloride 111 (H) 98 - 107 mmol/L    CO2 24 22 - 31 mmol/L    Anion Gap, Calculation 7 5 - 18 mmol/L    Glucose 101 70 - 125 mg/dL    BUN 7 (L) 8 - 22 mg/dL    " Creatinine 0.47 (L) 0.60 - 1.10 mg/dL    GFR MDRD Af Amer >60 >60 mL/min/1.73m2    GFR MDRD Non Af Amer >60 >60 mL/min/1.73m2    Bilirubin, Total 0.6 0.0 - 1.0 mg/dL    Calcium 9.0 8.5 - 10.5 mg/dL    Protein, Total 6.5 6.0 - 8.0 g/dL    Albumin 3.7 3.5 - 5.0 g/dL    Alkaline Phosphatase 201 (H) 45 - 120 U/L    AST 18 0 - 40 U/L    ALT 17 0 - 45 U/L   Thyroid Stimulating Hormone (TSH)    Collection Time: 09/16/20 12:47 PM   Result Value Ref Range    TSH <0.01 (L) 0.30 - 5.00 uIU/mL   T4, Free    Collection Time: 09/16/20 12:47 PM   Result Value Ref Range    Free T4 2.6 (H) 0.7 - 1.8 ng/dL   HM2(CBC w/o Differential)    Collection Time: 09/16/20 12:47 PM   Result Value Ref Range    WBC 4.2 4.0 - 11.0 thou/uL    RBC 4.39 3.80 - 5.40 mill/uL    Hemoglobin 11.8 (L) 12.0 - 16.0 g/dL    Hematocrit 36.3 35.0 - 47.0 %    MCV 83 80 - 100 fL    MCH 26.8 (L) 27.0 - 34.0 pg    MCHC 32.4 32.0 - 36.0 g/dL    RDW 10.6 (L) 11.0 - 14.5 %    Platelets 168 140 - 440 thou/uL    MPV 7.8 7.0 - 10.0 fL      Assessment and Plan:     1. Graves disease  Symptoms currently not controlled.  Blood pressure bit high.  Recommend starting metoprolol with potential fertility will avoid drugs with iatrogenic potential.  Long when I think she is needs to follow-up with neurology due to her on and off use of methimazole and persistent parathyroidism.  - Comprehensive Metabolic Panel  - Thyroid Stimulating Hormone (TSH)  - T4, Free  - HM2(CBC w/o Differential)  - metoprolol succinate (TOPROL XL) 25 MG; Take 1 tablet (25 mg total) by mouth daily.  Dispense: 60 tablet; Refill: 0  - Ambulatory referral to Endocrinology    2. Nexplanon removal  See procedure note below.  - lidocaine 10 mg/mL (1 %) injection 2.5 mL  - prenatal vit no.130-iron-folic (PRENATAL VITAMIN) 27 mg iron- 800 mcg Tab tablet; Take 1 tablet by mouth daily.  Dispense: 90 tablet; Refill: 3     PROCEDURE NOTE:    Confirmed patient's desire for removal.  Nexplanon easily palpated in  subdermal tissues of left arm.  Risks, benefits, alternatives to removal discussed.  Alternative birth control plan discussed.  Probability of return to fertility fairly quickly was discussed.    Patient laid supine on the table with her arm behind her head.  Nexplanon cassandra was palpated and marked.  Patient was prepped and draped sterilely.  A small incision was made at the distal end of the Nexplanon.  With a little bit of manipulation it was able to be grasped with a clamp and fully removed.  The cassandra was examined and found to be intact upon removal.  The wound was closed with a bandage and wrapped with a compression wrap.    Estimated blood loss was less than 10 mL.  Patient tolerated procedure well.  Aftercare instructions were provided.

## 2021-06-11 NOTE — PROGRESS NOTES
Progress Note    Reason for Visit:  Chief Complaint     Thyroid Problem          Progress Note:    HPI:    This pleasant 21-year-old female patient is here for follow-up because of hyperthyroidism.    Component      Latest Ref Rng & Units 4/7/2017 5/23/2017 5/24/2017   T3, Total      45 - 175 ng/dL 298 (H) 275 (H)    Free T4      0.7 - 1.8 ng/dL 1.7 1.7    Hemoglobin      12.0 - 16.0 g/dL  9.7 (L) 9.5 (L)   TSH      0.30 - 5.00 uIU/mL  <0.01 (L)          Review of Systems:    Nervous System: No headache, dizziness, fainting or memory loss. No tingling sensation of hand or feet.  Ears: No hearing loss or ringing in the ears  Eyes: No blurring of vision, redness, itching or dryness.  Nose: No nosebleed or loss of smell  Mouth: No mouth sores or loss of taste  Throat: No hoarseness or difficulty swallowing  Neck: No enlarged thyroid or lymph nodes.  Heart: No chest pain, palpitation or irregular heartbeat. No swelling of hands or feet  Lungs: No shortness of breath, cough, night sweats, wheezing or hemoptysis.  Gastrointestinal: No nausea or vomiting, constipation or diarrhea.  No acid reflux, abdominal pain or blood in stools.  Kidney/Bladdr: No polyuria, polydipsia, nocturia or hematuria.  Genital/Sexual: No loss of libido  Skin: No rash, hair loss or hirsutism.  No abnormal striae  Muscles/Joints/Bones: No morning stiffness, muscle aches and pain or loss of height.    Current Medications:  Current Outpatient Prescriptions   Medication Sig     atenolol (TENORMIN) 25 MG tablet Take 0.5 tablets (12.5 mg total) by mouth daily.     methIMAzole (TAPAZOLE) 5 MG tablet Take 5 tablets (25 mg total) by mouth daily.     acetaminophen (TYLENOL EXTRA STRENGTH) 500 MG tablet Take 2 tablets (1,000 mg total) by mouth every 8 (eight) hours as needed for pain.     ranitidine (ZANTAC) 150 MG tablet Take 1 tablet (150 mg total) by mouth 2 (two) times a day.       Patients Active Problems:  Patient Active Problem List   Diagnosis      "Tachycardia     Hepatitis A vaccination not up to date     Hyperthyroidism     Not immune to hepatitis B virus     Susceptible to varicella (non-immune), currently pregnant     Hyperthyroidism affecting pregnancy     Graves disease      (normal spontaneous vaginal delivery)       History:   reports that she has never smoked. She does not have any smokeless tobacco history on file. She reports that she does not drink alcohol or use illicit drugs.   reports that she has never smoked. She does not have any smokeless tobacco history on file. She reports that she does not drink alcohol or use illicit drugs.  History   Smoking Status     Never Smoker   Smokeless Tobacco     Not on file      reports that she has never smoked. She does not have any smokeless tobacco history on file. She reports that she does not drink alcohol or use illicit drugs.  History   Sexual Activity     Sexual activity: Yes     Partners: Male     Birth control/ protection: None     Past Medical History:   Diagnosis Date     Hyperthyroidism 2016     Intrauterine Growth Restriction      Precipitous delivery, delivered (current hospitalization) 2016      delivery 2016     Urinary tract infection      Family History   Problem Relation Age of Onset     No Medical Problems Father      Thyroid disease Mother      Mental retardation Brother      Past Medical History:   Diagnosis Date     Hyperthyroidism 2016     Intrauterine Growth Restriction      Precipitous delivery, delivered (current hospitalization) 2016      delivery 2016     Urinary tract infection      No past surgical history on file.    Vitals   height is 4' 9\" (1.448 m) and weight is 109 lb 12.8 oz (49.8 kg). Her blood pressure is 122/76.         Exam  General appearance: The patient looked well, not in acute distress.  Eyes: no evidence of thyroid eye disease.   Retinal exam: No evidence of diabetic retinopathy.  Mouth and Throat: Normal  Neck: " No evidence of thyromegaly, enlarged lymph node or tenderness  Chest: Trachea is central. Chest is clear to auscultation and percussion. Breat sounds are normal.  Cardiovascular exam: JVP is not raised. Heart sounds are normal, no murmurs or rub  Peripheral pulses are palpable.   Abdomen: No masses or tenderness.    Back: No vertebral tenderness or kyphosis.  Extremities: No evidence of leg edema.   Skin: Normal to touch.  No abnormal striae  Neurologic exam:  Visual fields are intact by confrontation, grossly intact. No evidence of peripheral neuropathy.  Detailed foot exam normal.        Diagnosis:  No diagnosis found.    Orders:   No orders of the defined types were placed in this encounter.        Assessment and Plan:  Hyperthyroidism.  The patient is currently on Tapazole 25 mg once a day atenolol 25 mg.    She is one-month postpartum she had 3 children.  She has been on her thyroid and on her Tapazole for a long time she still have tachycardia larger thyroid.  TSH undetectable free T4 1.7 total T3 275.    I discussed with the patient the different options of treatment the pros and cons of each modality of treatment.    She is not very compliant.    I told her we will check her thyroid function test today but if it is still high then we will go with radioactive iodine.  Treatment I told that she cannot be in touch with her children for 3 days after the surgery or after the radioactive iodine.    She cannot become pregnant for 1 year she cannot nurse her children she is currently in a nursing exam and the patient agrees.    She has some protrusion of her eyes but no active Graves' eye disease I did advise her to take selenium over-the-counter 200 mg.    3 proceeded with radioactive iodine treatment we should we will give her prednisone 20 mg 2 days before and after the procedure.    I thought to avoid passive smoking as her  is a smoker she is not.    Patient return to clinic in 3 months I did spend 40  minutes with the patient more than 50% was spent on counseling and managing her care.

## 2021-06-13 NOTE — PROGRESS NOTES
Procedure note  Patient would like Nexplanon for birth control.  She denies any unprotected sex within the past 2 weeks.  LMP was August 2017.  Risks, benefits, possible side effects of Nexplanon were discussed with patient.  All of her questions were answered.    Procedure:  Left upper inner arm was adequately anesthetized with 2.5 cc of lidocaine with Epi.  Then, using sterile technique, Nexplanon was inserted and cassandra was deployed without difficulty.  Nexplanon cassandra was palpable subcutaneously by myself.  Insertion site was covered with a band-aid and area was wrapped with a pressure bandage.  Patient was neurovascularly intact after exam.  Proper wound aftercare was dicussed with patient.    Nexplanon insertion.  Insertion date: 10/6/2017  3 year expiration date: 10/6/2020  Consent form was reviewed with patient, signed, and will be scanned in to her chart.  Patient was notified via [employer] to use backup birth control for the next 1 week.

## 2021-06-15 PROBLEM — E05.90 HYPERTHYROIDISM AFFECTING PREGNANCY: Status: ACTIVE | Noted: 2017-01-06

## 2021-06-15 PROBLEM — O99.280 HYPERTHYROIDISM AFFECTING PREGNANCY: Status: ACTIVE | Noted: 2017-01-06

## 2021-06-16 PROBLEM — Z30.017 NEXPLANON INSERTION: Status: ACTIVE | Noted: 2017-10-08

## 2021-06-16 NOTE — PROGRESS NOTES
Progress Note    Reason for Visit:  Chief Complaint     Hyperthyroidism          Progress Note:    HPI:    Hyperthyroidism.  The this patient is here for follow-up because of hyperthyroidism.    Component      Latest Ref Rng & Units 6/29/2017 10/6/2017 12/22/2017   Pregnancy Test, Urine      Negative  Negative    Specific [place], UA      1.001 - 1.030  1.030    TSH      0.30 - 5.00 uIU/mL <0.01 (L)  <0.01 (L)   Free T4      0.7 - 1.8 ng/dL 3.0 (H)  3.2 (H)   T3, Total      45 - 175 ng/dL 455 (H)  676 (H)       Review of Systems:    Nervous System: No headache, dizziness, fainting or memory loss. No tingling sensation of hand or feet.  Ears: No hearing loss or ringing in the ears  Eyes: No blurring of vision, redness, itching or dryness.  Nose: No nosebleed or loss of smell  Mouth: No mouth sores or loss of taste  Throat: No hoarseness or difficulty swallowing  Neck: No enlarged thyroid or lymph nodes.  Heart: No chest pain, palpitation or irregular heartbeat. No swelling of hands or feet  Lungs: No shortness of breath, cough, night sweats, wheezing or hemoptysis.  Gastrointestinal: No nausea or vomiting, constipation or diarrhea.  No acid reflux, abdominal pain or blood in stools.  Kidney/Bladdr: No polyuria, polydipsia, nocturia or hematuria.  Genital/Sexual: No loss of libido  Skin: No rash, hair loss or hirsutism.  No abnormal striae  Muscles/Joints/Bones: No morning stiffness, muscle aches and pain or loss of height.    Current Medications:  Current Outpatient Prescriptions   Medication Sig     acetaminophen (TYLENOL EXTRA STRENGTH) 500 MG tablet Take 2 tablets (1,000 mg total) by mouth every 8 (eight) hours as needed for pain.     atenolol (TENORMIN) 50 MG tablet Take 1 tablet (50 mg total) by mouth daily.     methIMAzole (TAPAZOLE) 10 MG tablet Take 3 tablets (30 mg total) by mouth daily.       Patients Active Problems:  Patient Active Problem List   Diagnosis     Tachycardia     Hepatitis A vaccination not  "up to date     Hyperthyroidism     Not immune to hepatitis B virus     Susceptible to varicella (non-immune), currently pregnant     Hyperthyroidism affecting pregnancy     Graves disease     Nexplanon insertion (10/6/17)       History:   does not have a smoking history on file. She has never used smokeless tobacco. She reports that she does not drink alcohol or use illicit drugs.   does not have a smoking history on file. She has never used smokeless tobacco. She reports that she does not drink alcohol or use illicit drugs.  History   Smoking Status     Not on file   Smokeless Tobacco     Never Used      does not have a smoking history on file. She has never used smokeless tobacco. She reports that she does not drink alcohol or use illicit drugs.  History   Sexual Activity     Sexual activity: Yes     Partners: Male     Birth control/ protection: None     Past Medical History:   Diagnosis Date     Hyperthyroidism 2016     Intrauterine Growth Restriction      Precipitous delivery, delivered (current hospitalization) 2016      delivery 2016     Urinary tract infection      Family History   Problem Relation Age of Onset     No Medical Problems Father      Thyroid disease Mother      Mental retardation Brother      Past Medical History:   Diagnosis Date     Hyperthyroidism 2016     Intrauterine Growth Restriction      Precipitous delivery, delivered (current hospitalization) 2016      delivery 2016     Urinary tract infection      No past surgical history on file.    Vitals   height is 4' 9\" (1.448 m) and weight is 106 lb 11.2 oz (48.4 kg). Her blood pressure is 114/66.         Exam  General appearance: The patient looked well, not in acute distress.  Eyes: no evidence of thyroid eye disease.   Retinal exam: No evidence of diabetic retinopathy.  Mouth and Throat: Normal  Neck: No evidence of thyromegaly, enlarged lymph node or tenderness  Chest: Trachea is central. Chest is " clear to auscultation and percussion. Breat sounds are normal.  Cardiovascular exam: JVP is not raised. Heart sounds are normal, no murmurs or rub  Peripheral pulses are palpable.   Abdomen: No masses or tenderness.    Back: No vertebral tenderness or kyphosis.  Extremities: No evidence of leg edema.   Skin: Normal to touch.  No abnormal striae  Neurologic exam:  Visual fields are intact by confrontation, grossly intact. No evidence of peripheral neuropathy.  Detailed foot exam normal.        Diagnosis:  No diagnosis found.    Orders:   No orders of the defined types were placed in this encounter.        Assessment and Plan: Hyperthyroidism with the patient has Graves' disease she is currently on Tapazole 10 mg 3 times a day the patient takes the drug on on and off basis she said she has been on it since December.    She has Graves' eye disease with protrusion of her eyes and pain and irritation but no actual conjunctivitis.    She is a secondhand smoker.    I did advise her to take selenium 200 mg daily.    I would put her on prednisone 20 mg for 1 week.    I will refer the patient to ophthalmology.    The patient is on atenolol according to her 25 mg daily she is asymptomatic.    The patient is developing a rash or eczema she said that this happened since the 2 blood from her back in December.    I did advise the patient had blood done today and to tell them that she is allergic probably to latex.    But there is a also a possibility that she is allergic to the Tapazole.  We cannot give her radioactive iodine because of her eye symptoms so I did advise her to proceed with surgery I would refer the patient to Dr. Bunny Reynoso.    She has an enlarged thyroid 3 times upper limit of normal was bilateral thyroid bruit I did advise her to continue taking her Tapazole up to surgery and atenolol and she will definitely needs to take Lugol's iodine for 2 weeks before the surgery.    Patient will return to clinic in 6 month  I did spend 40 minutes with the patient more than 50% was spent on counseling and managing her care.    Patient has 3 children 4 and 2 years and 9 month old.  She is on birth control.

## 2021-06-19 NOTE — PROGRESS NOTES
Progress Note    Reason for Visit:  Chief Complaint     Thyroid Problem          Progress Note:    HPI: This patient is here for follow-up because of hyperthyroidism due to Graves' disease.  Component      Latest Ref Rng & Units 12/22/2017 2/16/2018 7/25/2018   Free T4      0.7 - 1.8 ng/dL 3.2 (H) 1.2 0.4 (L)   T3, Total      45 - 175 ng/dL 676 (H) 236 (H)    TSH      0.30 - 5.00 uIU/mL <0.01 (L) <0.01 (L) 2.89         Review of Systems:    Nervous System: No headache, dizziness, fainting or memory loss. No tingling sensation of hand or feet.  Ears: No hearing loss or ringing in the ears  Eyes: No blurring of vision, redness, itching or dryness.  Nose: No nosebleed or loss of smell  Mouth: No mouth sores or loss of taste  Throat: No hoarseness or difficulty swallowing  Neck: No enlarged thyroid or lymph nodes.  Heart: No chest pain, palpitation or irregular heartbeat. No swelling of hands or feet  Lungs: No shortness of breath, cough, night sweats, wheezing or hemoptysis.  Gastrointestinal: No nausea or vomiting, constipation or diarrhea.  No acid reflux, abdominal pain or blood in stools.  Kidney/Bladdr: No polyuria, polydipsia, nocturia or hematuria.  Genital/Sexual: No loss of libido  Skin: No rash, hair loss or hirsutism.  No abnormal striae  Muscles/Joints/Bones: No morning stiffness, muscle aches and pain or loss of height.    Current Medications:  Current Outpatient Prescriptions   Medication Sig     methIMAzole (TAPAZOLE) 10 MG tablet TAKE 2 TABLETS(20 MG) BY MOUTH DAILY     white petrolatum-mineral oil (REFRESH LACRI-LUBE) 56.8-42.5 % Oint Apply to eyelid/eye at night       Patients Active Problems:  Patient Active Problem List   Diagnosis     Tachycardia     Hepatitis A vaccination not up to date     Hyperthyroidism     Not immune to hepatitis B virus     Susceptible to varicella (non-immune), currently pregnant     Hyperthyroidism affecting pregnancy     Graves disease     Nexplanon insertion (10/6/17)  "      History:   reports that she is a non-smoker but has been exposed to tobacco smoke. She has never used smokeless tobacco. She reports that she does not drink alcohol or use illicit drugs.   reports that she is a non-smoker but has been exposed to tobacco smoke. She has never used smokeless tobacco. She reports that she does not drink alcohol or use illicit drugs.  History   Smoking Status     Passive Smoke Exposure - Never Smoker   Smokeless Tobacco     Never Used      reports that she is a non-smoker but has been exposed to tobacco smoke. She has never used smokeless tobacco. She reports that she does not drink alcohol or use illicit drugs.  History   Sexual Activity     Sexual activity: Yes     Partners: Male     Birth control/ protection: None     Past Medical History:   Diagnosis Date     Hyperthyroidism 2016     Intrauterine Growth Restriction      Precipitous delivery, delivered (current hospitalization) 2016      delivery 2016     Urinary tract infection      Family History   Problem Relation Age of Onset     No Medical Problems Father      Thyroid disease Mother      Mental retardation Brother      Past Medical History:   Diagnosis Date     Hyperthyroidism 2016     Intrauterine Growth Restriction      Precipitous delivery, delivered (current hospitalization) 2016      delivery 2016     Urinary tract infection      No past surgical history on file.    Vitals   height is 4' 9\" (1.448 m) and weight is 116 lb 1.6 oz (52.7 kg). Her blood pressure is 104/68.         Exam  General appearance: The patient looked well, not in acute distress.  Eyes: no evidence of thyroid eye disease.   Retinal exam: No evidence of diabetic retinopathy.  Mouth and Throat: Normal  Neck: No evidence of thyromegaly, enlarged lymph node or tenderness  Chest: Trachea is central. Chest is clear to auscultation and percussion. Breat sounds are normal.  Cardiovascular exam: JVP is not raised. " Heart sounds are normal, no murmurs or rub  Peripheral pulses are palpable.   Abdomen: No masses or tenderness.    Back: No vertebral tenderness or kyphosis.  Extremities: No evidence of leg edema.   Skin: Normal to touch.  No abnormal striae  Neurologic exam:  Visual fields are intact by confrontation, grossly intact. No evidence of peripheral neuropathy.  Detailed foot exam normal.        Diagnosis:  No diagnosis found.    Orders:   No orders of the defined types were placed in this encounter.        Assessment and Plan: Patient has severe hyperthyroidism due to Graves' disease.  I kept her on Tapazole 20 mg.    Free T4 came down to 1.2-0.4 TSH 2.89.    Patient is feeling fine she has markedly enlarged with goiter.  She also has significant Graves' eye disease so is not active now.  I discussed with the patient that I do not feel is appropriate for her to have radioactive iodine.    I did refer her to surgery last time but Dr. Brenner told her her thyroid has to be controlled and it is now.    I asked her to discuss with Dr. Brenner again.    Patient already has 3 children.    We will check thyroid function test today and I did advise her to decrease Tapazole to, 10 mg once a day.  Patient will return to clinic in 6 months we will check thyroid function test every 3 month.    I have reviewed and ordered clinical lab test    I have reviewed and ordered radiology tests.    I have reviewed and ordered her medication as required.    I have reviewed her test results and advised with the performing physician.    I have reviewed the patient's old records.    I have reviewed and summarized the patient old records.    I did spend 40 minutes with the patient more than 50% was spent on counseling and managing her care.

## 2021-06-20 NOTE — PROGRESS NOTES
"HPI: Pt is here for follow up of a large goiter secondary to Graves' disease.  We had seen her initially in March, and sent her to Dr. Sharma for control of her hyperthyroidism.  He has been treating her with methimazole 20 mg per day, and on 7/25/2018 her TSH was in the normal range.  She returned to him on 8/16/2018, and he consider decreasing the dose of Tapazole but at that point her TSH was 0.05 so he put it back on 20 mg per day and asked her to follow with me to set up surgery.  She continues to be symptomatic.    Allergies, Medications, Social History, Past Medical History and Past Surgical History were reviewed and are noted in the chart.    /72 (Patient Site: Right Arm, Patient Position: Sitting, Cuff Size: Adult Small)  Pulse 69  Ht 4' 9\" (1.448 m)  Wt 115 lb (52.2 kg)  SpO2 99%  BMI 24.89 kg/m2      EXAM: This is a  23 y.o. female in no distress  GENERAL: Appears well.  Some proptosis.  NECK: Large goiter visible in the neck, approximately 9 cm in left-to-right dimension      Assessment/Plan: Yusuf Stubbs is following up for hyperthyroidism.  We had a long discussion, and the session was almost entirely consultative.  I reiterated to her that we need to have her thyroid under good control at the time of surgery, because it is dangerous to operate on her when she is in a hyperthyroid state both from the standpoint of thyroid storm and increased risk of bleeding.  I have increased her methimazole to 30 mg per day, and asked her to get a repeat TSH today to confirm that she is still hyperthyroid.  Assuming that she is still hyperthyroid, we will continue the 30 mg per day for 2 weeks and then recheck her TSH.  If it is controlled at that point, we will set her up for thyroidectomy.  I have also given her a prescription for Lugol's iodide, and instructed her to take it for 10 days prior to the operation.  I went over the risks of total thyroidectomy with her again.  I mentioned bleeding and " infection, but I concentrated on recurrent laryngeal nerve injury, and hypoparathyroidism.  She remembered our previous discussion, and understood these to be the risks.    Bunny Reynoso MD  [facility] Department of Surgery

## 2021-06-24 NOTE — PROGRESS NOTES
Progress Note    Reason for Visit:  Chief Complaint     Thyroid Problem          Progress Note:    HPI: Patient has severe hyperthyroidism due to Graves' disease.  I kept her on Tapazole 20 mg.     Free T4 came down to 1.2-0.4 TSH 2.89.     Patient is feeling fine she has markedly enlarged with goiter.  She also has significant Graves' eye disease so is not active now.  I discussed with the patient that I do not feel is appropriate for her to have radioactive iodine.     I did refer her to surgery last time but Dr. Brenner told her her thyroid has to be controlled and it is now.     I asked her to discuss with Dr. Brenner again.     Patient already has 3 children.     We will check thyroid function test today and I did advise her to decrease Tapazole to, 10 mg once a day  Component      Latest Ref Rng & Units 8/16/2018 8/28/2018 2/8/2019   Creatinine      0.60 - 1.10 mg/dL  0.55 (L)    GFR MDRD Af Amer      >60 mL/min/1.73m2  >60    GFR MDRD Non Af Amer      >60 mL/min/1.73m2  >60    TSH      0.30 - 5.00 uIU/mL 0.05 (L) <0.01 (L) <0.01 (L)   Free T4      0.7 - 1.8 ng/dL 0.6 (L)  2.0 (H)   T3, Total      45 - 175 ng/dL 208 (H)  364 (H)   Potassium      3.5 - 5.0 mmol/L  4.3        Review of Systems:    Nervous System: No headache, dizziness, fainting or memory loss. No tingling sensation of hand or feet.  Ears: No hearing loss or ringing in the ears  Eyes: No blurring of vision, redness, itching or dryness.  Nose: No nosebleed or loss of smell  Mouth: No mouth sores or loss of taste  Throat: No hoarseness or difficulty swallowing  Neck: No enlarged thyroid or lymph nodes.  Heart: No chest pain, palpitation or irregular heartbeat. No swelling of hands or feet  Lungs: No shortness of breath, cough, night sweats, wheezing or hemoptysis.  Gastrointestinal: No nausea or vomiting, constipation or diarrhea.  No acid reflux, abdominal pain or blood in stools.  Kidney/Bladdr: No polyuria, polydipsia, nocturia or  hematuria.  Genital/Sexual: No loss of libido  Skin: No rash, hair loss or hirsutism.  No abnormal striae  Muscles/Joints/Bones: No morning stiffness, muscle aches and pain or loss of height.    Current Medications:  Current Outpatient Medications   Medication Sig     methIMAzole (TAPAZOLE) 10 MG tablet Take 3 tablets (30 mg total) by mouth daily.     white petrolatum-mineral oil (REFRESH LACRI-LUBE) 56.8-42.5 % Oint Apply to eyelid/eye at night       Patients Active Problems:  Patient Active Problem List   Diagnosis     Tachycardia     Hepatitis A vaccination not up to date     Hyperthyroidism     Not immune to hepatitis B virus     Susceptible to varicella (non-immune), currently pregnant     Hyperthyroidism affecting pregnancy     Graves disease     Nexplanon insertion (10/6/17)       History:   reports that she is a non-smoker but has been exposed to tobacco smoke. she has never used smokeless tobacco. She reports that she does not drink alcohol or use drugs.   reports that she is a non-smoker but has been exposed to tobacco smoke. she has never used smokeless tobacco. She reports that she does not drink alcohol or use drugs.  Social History     Tobacco Use   Smoking Status Passive Smoke Exposure - Never Smoker   Smokeless Tobacco Never Used      reports that she is a non-smoker but has been exposed to tobacco smoke. she has never used smokeless tobacco. She reports that she does not drink alcohol or use drugs.  Social History     Substance and Sexual Activity   Sexual Activity Yes     Partners: Male     Birth control/protection: None     Past Medical History:   Diagnosis Date     Hyperthyroidism 2016     Intrauterine Growth Restriction      Precipitous delivery, delivered (current hospitalization) 2016      delivery 2016     Urinary tract infection      Family History   Problem Relation Age of Onset     No Medical Problems Father      Thyroid disease Mother      Mental retardation Brother  "     Past Medical History:   Diagnosis Date     Hyperthyroidism 2016     Intrauterine Growth Restriction      Precipitous delivery, delivered (current hospitalization) 2016      delivery 2016     Urinary tract infection      Past Surgical History:   Procedure Laterality Date     NO PAST SURGERIES         Vitals   height is 4' 9\" (1.448 m) and weight is 110 lb 12.8 oz (50.3 kg). Her blood pressure is 120/72.         Exam  General appearance: The patient looked well, not in acute distress.  Eyes: no evidence of thyroid eye disease.   Retinal exam: No evidence of diabetic retinopathy.  Mouth and Throat: Normal  Neck: No evidence of thyromegaly, enlarged lymph node or tenderness  Chest: Trachea is central. Chest is clear to auscultation and percussion. Breat sounds are normal.  Cardiovascular exam: JVP is not raised. Heart sounds are normal, no murmurs or rub  Peripheral pulses are palpable.   Abdomen: No masses or tenderness.    Back: No vertebral tenderness or kyphosis.  Extremities: No evidence of leg edema.   Skin: Normal to touch.  No abnormal striae  Neurologic exam:  Visual fields are intact by confrontation, grossly intact. No evidence of peripheral neuropathy.  Detailed foot exam normal.        Diagnosis:  No diagnosis found.    Orders:   No orders of the defined types were placed in this encounter.        Assessment and Plan: This patient is here for follow-up because of hyperthyroidism due to Graves' disease.  The patient is currently on Tapazole 30 mg her TSH is undetectable free T4 is 2 total T3 364.    I have referred the patient for surgery but she is not comfortable proceeding with surgery she does have a large thyroid.    She has 3 children the youngest is 1-year-old the oldest 5 years.    She has minimal eye protrusion but no activity to do so she does not have active Graves' eye disease.    I discussed with the patient options of switching her to be 200 mg twice a day or doing " radioactive iodine she proceeded with radioactive iodine I told her it may worsen her eyes patient said she is willing to have the risk.    She has been active thyroid eye disease we will go ahead and I advised her to stop the Tapazole we will do thyroid scan and uptake thyroid by radioactive iodine ablation for hyperthyroidism but I did advise her to take prednisone 20 mg for 3 days before and after the procedure and will be the patient I told that she cannot become pregnant at least for a year after the procedure and will check urine pregnancy test the patient will return to clinic in 6 months we will check thyroid function in 3 months.  She has enlarged thyroid 3 x 3-4 times upper limit of normal.    I have reviewed and ordered clinical lab test    I have reviewed and ordered her medication as required.    I have reviewed her test results and advised with the performing physician.    I have reviewed the patient's old records.    I have reviewed and summarized the patient old records.

## 2021-06-24 NOTE — PATIENT INSTRUCTIONS - HE
It is my pleasure seeing you in the clinic today.      Please discontinue Tapazole.  Do radioactive iodine ablation for hyperthyroidism check thyroid scan and uptake then proceed with radioactive iodine treatment.  Take prednisone 20 mg 3 days before and after the procedure.    Check TSH free T4 total T3 in 3 months follow-up in 6 months.            Thank you for allowing me to participate in your care.        We work very hard to achieve the best quality of care.  We would be very grateful if you complete any survey you receive regarding this visit, so that we continue to improve our care.

## 2021-06-26 NOTE — PROGRESS NOTES
Progress Notes by Bunny Reynoso MD at 3/15/2018  3:15 PM     Author: Bunny Reynoso MD Service: -- Author Type: Physician    Filed: 3/15/2018  3:49 PM Encounter Date: 3/15/2018 Status: Signed    : Bunny Reynoso MD (Physician)       HPI: I am consulted by Kuldip Sharma MD and Victorino White MD in this 22 y.o. female regarding hyperthyroidism.  This was initially detected during her second pregnancy 2 years ago, at which time she had weight loss, sweating and tremulousness and was found to be hypothyroid. She has been under Dr. Sharma's care since.  She has been treated with Tapazole and atenolol, but she has ophthalmic stigmata of Graves' disease and is not a candidate for radio active iodine.   Currently, she has some  symptoms of toxicity.   No some difficulty with breathing and voice changes.  But she does have difficulty swallowing and has to lower her head in order to swallow.  She has diarrhea, palpitations and sweating.  Her weight is actually increasing rather than declining.    No Known Allergies      Current Outpatient Prescriptions:   ?  atenolol (TENORMIN) 50 MG tablet, Take 1 tablet (50 mg total) by mouth daily., Disp: 90 tablet, Rfl: 2  ?  methIMAzole (TAPAZOLE) 10 MG tablet, Take 2 tablets (20 mg total) by mouth daily., Disp: 60 tablet, Rfl: 0  ?  predniSONE (DELTASONE) 20 MG tablet, Take 1 tablet daily for 1 week, Disp: 7 tablet, Rfl: 1    Current Facility-Administered Medications:   ?  etonogestrel 68 mg implant 1 each (NEXPLANON), 1 each, Subdermal, Once, Maine Ramsey PA-C    Past Medical History:   Diagnosis Date   ? Hyperthyroidism 2016   ? Intrauterine Growth Restriction    ? Precipitous delivery, delivered (current hospitalization) 2016   ?  delivery 2016   ? Urinary tract infection        History reviewed. No pertinent surgical history.    Social History     Social History   ? Marital status: Single     Spouse name: N/A   ? Number of children: N/A  "  ? Years of education: N/A     Occupational History   ? Not on file.     Social History Main Topics   ? Smoking status: Passive Smoke Exposure - Never Smoker   ? Smokeless tobacco: Never Used   ? Alcohol use No   ? Drug use: No   ? Sexual activity: Yes     Partners: Male     Birth control/ protection: None     Other Topics Concern   ? Not on file     Social History Narrative       Review of Systems - Negative except as noted in the HPI    /69 (Patient Site: Right Arm, Patient Position: Sitting, Cuff Size: Adult Regular)  Pulse 66  Ht 4' 9.2\" (1.453 m)  Wt 110 lb 12.8 oz (50.3 kg)  LMP Comment: Nexplanon  SpO2 99%  Breastfeeding? No  BMI 23.81 kg/m2  Body mass index is 23.81 kg/(m^2).    EXAM:  GENERAL: This is a thin female in no distress.  SKIN: Grossly intact  EYES: No icterus  CARDIAC: RRR w/out murmur  CHEST/LUNG: Clear  THYROID:  Bilateral enlargement (significant). Nontender.  It does give her a sensation of neck compression when I palpate the thyroid.  NECK: No cervical adenopathy.   NEURO: Alert and oriented  AFFECT: Pleasant    LABS:     Ref Range & Units 2/16/18 12:06 PM     TSH 0.30 - 5.00 uIU/mL <0.01 (L)        Ref Range & Units 2/16/18 12:06 PM     Free T4 0.7 - 1.8 ng/dL 1.2         Ref Range & Units   2/16/18 12:06 PM   12/22/17 12:05 PM   6/29/17 12:04 PM   5/23/17  9:14 AM   4/7/17  8:32 AM    T3, Total 45 - 175 ng/dL 236 (H) 676 (H) 455 (H) 275 (H) 298            IMAGES:   No results found.    Assessment/Plan: Ms. Stubbs has hyperthyroidism due to Graves' disease. We discussed antithyroid medication, radioactive iodine, and surgical therapy.  We discussed total thyroidectomy. Risks addressed included death, bleeding and infection, but I specifically concentrated on recurrent laryngeal nerve injury and hypoparathyroidism. She will need thyroid hormone replacement following the operation.  She will need to be euthyroid for the surgery, and will need Lugol's iodide preoperatively.  We will " return her to Dr. Sharma's care in order to accomplish the euthyroid state.  She understood the discussion, and wishes to proceed with surgery. We will set up the surgery once she is euthyroid.    Bunny Reynoso MD  [facility] Department of Surgery

## 2021-07-10 ENCOUNTER — HEALTH MAINTENANCE LETTER (OUTPATIENT)
Age: 26
End: 2021-07-10

## 2021-07-14 PROBLEM — O36.8990 FETAL PERICARDIAL EFFUSION AFFECTING MANAGEMENT OF MOTHER: Status: RESOLVED | Noted: 2017-01-29 | Resolved: 2017-04-07

## 2021-07-14 PROBLEM — Z34.90 PREGNANT: Status: RESOLVED | Noted: 2017-05-20 | Resolved: 2017-05-21

## 2021-09-04 ENCOUNTER — HEALTH MAINTENANCE LETTER (OUTPATIENT)
Age: 26
End: 2021-09-04

## 2021-11-02 ENCOUNTER — TELEPHONE (OUTPATIENT)
Dept: FAMILY MEDICINE | Facility: CLINIC | Age: 26
End: 2021-11-02

## 2021-11-02 NOTE — TELEPHONE ENCOUNTER
Reason for Call:  Pregnancy Confirmation needed  1st day of last menstrual cycle 9/14/21  home pregnancy test postive on 10/29/21  patient would like to be seen as soon as possible  Patient would like to be seen by her PCP Dr. Sharee Smith contact patient for next route of care    PCP: Victorino White      Can we leave a detailed message on this number? YES    Phone number patient can be reached at: Home number on file [phone] (home)      Call taken on 11/2/2021 at 8:20 AM by Shamika Dickson

## 2021-11-16 ENCOUNTER — OFFICE VISIT (OUTPATIENT)
Dept: FAMILY MEDICINE | Facility: CLINIC | Age: 26
End: 2021-11-16
Payer: COMMERCIAL

## 2021-11-16 VITALS
BODY MASS INDEX: 22.44 KG/M2 | WEIGHT: 104 LBS | HEART RATE: 69 BPM | RESPIRATION RATE: 20 BRPM | TEMPERATURE: 98.2 F | HEIGHT: 57 IN | SYSTOLIC BLOOD PRESSURE: 126 MMHG | DIASTOLIC BLOOD PRESSURE: 74 MMHG

## 2021-11-16 DIAGNOSIS — E05.00 GRAVES DISEASE: ICD-10-CM

## 2021-11-16 DIAGNOSIS — Z3A.09 9 WEEKS GESTATION OF PREGNANCY: ICD-10-CM

## 2021-11-16 DIAGNOSIS — O99.281 HYPERTHYROIDISM AFFECTING PREGNANCY IN FIRST TRIMESTER: ICD-10-CM

## 2021-11-16 DIAGNOSIS — N91.2 AMENORRHEA: Primary | ICD-10-CM

## 2021-11-16 DIAGNOSIS — E05.90 HYPERTHYROIDISM: ICD-10-CM

## 2021-11-16 DIAGNOSIS — E05.90 HYPERTHYROIDISM AFFECTING PREGNANCY IN FIRST TRIMESTER: ICD-10-CM

## 2021-11-16 PROBLEM — Z30.017 NEXPLANON INSERTION: Status: RESOLVED | Noted: 2017-10-08 | Resolved: 2021-11-16

## 2021-11-16 LAB
ABO/RH(D): NORMAL
ANTIBODY SCREEN: NEGATIVE
BASOPHILS # BLD AUTO: 0 10E3/UL (ref 0–0.2)
BASOPHILS NFR BLD AUTO: 0 %
EOSINOPHIL # BLD AUTO: 0 10E3/UL (ref 0–0.7)
EOSINOPHIL NFR BLD AUTO: 0 %
ERYTHROCYTE [DISTWIDTH] IN BLOOD BY AUTOMATED COUNT: 11.4 % (ref 10–15)
HBA1C MFR BLD: 4.8 % (ref 0–5.6)
HCG UR QL: POSITIVE
HCT VFR BLD AUTO: 39.8 % (ref 35–47)
HGB BLD-MCNC: 13.1 G/DL (ref 11.7–15.7)
HIV 1+2 AB+HIV1 P24 AG SERPL QL IA: NEGATIVE
IMM GRANULOCYTES # BLD: 0 10E3/UL
IMM GRANULOCYTES NFR BLD: 0 %
LYMPHOCYTES # BLD AUTO: 1.1 10E3/UL (ref 0.8–5.3)
LYMPHOCYTES NFR BLD AUTO: 21 %
MCH RBC QN AUTO: 26.5 PG (ref 26.5–33)
MCHC RBC AUTO-ENTMCNC: 32.9 G/DL (ref 31.5–36.5)
MCV RBC AUTO: 80 FL (ref 78–100)
MONOCYTES # BLD AUTO: 0.4 10E3/UL (ref 0–1.3)
MONOCYTES NFR BLD AUTO: 8 %
NEUTROPHILS # BLD AUTO: 3.6 10E3/UL (ref 1.6–8.3)
NEUTROPHILS NFR BLD AUTO: 71 %
PLATELET # BLD AUTO: 165 10E3/UL (ref 150–450)
RBC # BLD AUTO: 4.95 10E6/UL (ref 3.8–5.2)
SPECIMEN EXPIRATION DATE: NORMAL
T4 FREE SERPL-MCNC: 3 NG/DL (ref 0.7–1.8)
TSH SERPL DL<=0.005 MIU/L-ACNC: <0.01 UIU/ML (ref 0.3–5)
WBC # BLD AUTO: 5.1 10E3/UL (ref 4–11)

## 2021-11-16 PROCEDURE — 84443 ASSAY THYROID STIM HORMONE: CPT | Performed by: PHYSICIAN ASSISTANT

## 2021-11-16 PROCEDURE — 99213 OFFICE O/P EST LOW 20 MIN: CPT | Mod: 25 | Performed by: PHYSICIAN ASSISTANT

## 2021-11-16 PROCEDURE — 87086 URINE CULTURE/COLONY COUNT: CPT | Performed by: PHYSICIAN ASSISTANT

## 2021-11-16 PROCEDURE — 86803 HEPATITIS C AB TEST: CPT | Performed by: PHYSICIAN ASSISTANT

## 2021-11-16 PROCEDURE — 86901 BLOOD TYPING SEROLOGIC RH(D): CPT | Performed by: PHYSICIAN ASSISTANT

## 2021-11-16 PROCEDURE — 90686 IIV4 VACC NO PRSV 0.5 ML IM: CPT | Performed by: PHYSICIAN ASSISTANT

## 2021-11-16 PROCEDURE — 99207 PR PRENATAL FLOW SHEET: CPT | Performed by: PHYSICIAN ASSISTANT

## 2021-11-16 PROCEDURE — 85025 COMPLETE CBC W/AUTO DIFF WBC: CPT | Performed by: PHYSICIAN ASSISTANT

## 2021-11-16 PROCEDURE — 86762 RUBELLA ANTIBODY: CPT | Performed by: PHYSICIAN ASSISTANT

## 2021-11-16 PROCEDURE — 87340 HEPATITIS B SURFACE AG IA: CPT | Performed by: PHYSICIAN ASSISTANT

## 2021-11-16 PROCEDURE — 86900 BLOOD TYPING SEROLOGIC ABO: CPT | Performed by: PHYSICIAN ASSISTANT

## 2021-11-16 PROCEDURE — 90471 IMMUNIZATION ADMIN: CPT | Performed by: PHYSICIAN ASSISTANT

## 2021-11-16 PROCEDURE — 81025 URINE PREGNANCY TEST: CPT | Performed by: PHYSICIAN ASSISTANT

## 2021-11-16 PROCEDURE — 87389 HIV-1 AG W/HIV-1&-2 AB AG IA: CPT | Performed by: PHYSICIAN ASSISTANT

## 2021-11-16 PROCEDURE — 84439 ASSAY OF FREE THYROXINE: CPT | Performed by: PHYSICIAN ASSISTANT

## 2021-11-16 PROCEDURE — 36415 COLL VENOUS BLD VENIPUNCTURE: CPT | Performed by: PHYSICIAN ASSISTANT

## 2021-11-16 PROCEDURE — 86780 TREPONEMA PALLIDUM: CPT | Performed by: PHYSICIAN ASSISTANT

## 2021-11-16 PROCEDURE — 86850 RBC ANTIBODY SCREEN: CPT | Performed by: PHYSICIAN ASSISTANT

## 2021-11-16 PROCEDURE — 83036 HEMOGLOBIN GLYCOSYLATED A1C: CPT | Performed by: PHYSICIAN ASSISTANT

## 2021-11-16 RX ORDER — PRENATAL VIT/IRON FUM/FOLIC AC 27MG-0.8MG
1 TABLET ORAL DAILY
Qty: 90 TABLET | Refills: 3 | Status: SHIPPED | OUTPATIENT
Start: 2021-11-16 | End: 2023-03-28

## 2021-11-16 ASSESSMENT — MIFFLIN-ST. JEOR: SCORE: 1080.62

## 2021-11-16 NOTE — PATIENT INSTRUCTIONS
Pregnancy: 9 weeks    Due Date: June 21, 2022    Next visit in 4 weeks  With Dr. White  For Your First OB Visit      Someone should be calling you within 1 week to schedule your appointment.  If you would like to schedule your ultrasound yourself, call #[phone].

## 2021-11-16 NOTE — LETTER
11/16/21          To Whom It May Concern:      Yusuf Stubbs (1995) is pregnant.  Estimated Date of Delivery: Jun 21, 2022        Sincerely,    Maine Ramsey PA-C

## 2021-11-17 LAB
HBV SURFACE AG SERPL QL IA: NONREACTIVE
HCV AB SERPL QL IA: NEGATIVE
RUBV IGG SERPL QL IA: 0.75 INDEX
RUBV IGG SERPL QL IA: NORMAL
T PALLIDUM AB SER QL: NONREACTIVE

## 2021-11-18 ENCOUNTER — TELEPHONE (OUTPATIENT)
Dept: FAMILY MEDICINE | Facility: CLINIC | Age: 26
End: 2021-11-18
Payer: COMMERCIAL

## 2021-11-18 DIAGNOSIS — O99.281 HYPERTHYROIDISM AFFECTING PREGNANCY IN FIRST TRIMESTER: Primary | ICD-10-CM

## 2021-11-18 DIAGNOSIS — E05.90 HYPERTHYROIDISM AFFECTING PREGNANCY IN FIRST TRIMESTER: Primary | ICD-10-CM

## 2021-11-18 DIAGNOSIS — Z3A.09 9 WEEKS GESTATION OF PREGNANCY: ICD-10-CM

## 2021-11-18 LAB — BACTERIA UR CULT: NO GROWTH

## 2021-11-18 RX ORDER — PROPYLTHIOURACIL 50 MG/1
50 TABLET ORAL 2 TIMES DAILY
Qty: 60 TABLET | Refills: 0 | Status: SHIPPED | OUTPATIENT
Start: 2021-11-18 | End: 2022-01-05

## 2021-11-19 ENCOUNTER — APPOINTMENT (OUTPATIENT)
Dept: URGENT CARE | Facility: CLINIC | Age: 26
End: 2021-11-19
Payer: COMMERCIAL

## 2021-11-19 ENCOUNTER — MYC MEDICAL ADVICE (OUTPATIENT)
Dept: FAMILY MEDICINE | Facility: CLINIC | Age: 26
End: 2021-11-19
Payer: COMMERCIAL

## 2021-11-19 DIAGNOSIS — E05.90 HYPERTHYROIDISM: Primary | ICD-10-CM

## 2021-11-19 DIAGNOSIS — U07.1 LAB TEST POSITIVE FOR DETECTION OF COVID-19 VIRUS: Primary | ICD-10-CM

## 2021-11-19 DIAGNOSIS — O99.281 HYPERTHYROIDISM AFFECTING PREGNANCY IN FIRST TRIMESTER: ICD-10-CM

## 2021-11-19 DIAGNOSIS — U07.1 LAB TEST POSITIVE FOR DETECTION OF COVID-19 VIRUS: ICD-10-CM

## 2021-11-19 DIAGNOSIS — E05.90 HYPERTHYROIDISM AFFECTING PREGNANCY IN FIRST TRIMESTER: ICD-10-CM

## 2021-11-19 NOTE — TELEPHONE ENCOUNTER
LM for pt.  I said she is hyperthyroid and this needs to be controlled during pregnancy.  I told her prescription was sent for PTU to her pharmacy.  Also M referral made to review her hyperthyroid during pregnancy.  I discussed it is very important that she take this medicine every day during pregnancy.  [facility] should be calling her to make an appointment.  I asked her to contact us via phone or [employer] if any questions or concerns.

## 2021-11-20 DIAGNOSIS — E05.90 HYPERTHYROIDISM AFFECTING PREGNANCY IN FIRST TRIMESTER: Primary | ICD-10-CM

## 2021-11-20 DIAGNOSIS — O99.281 HYPERTHYROIDISM AFFECTING PREGNANCY IN FIRST TRIMESTER: Primary | ICD-10-CM

## 2021-11-22 NOTE — PROGRESS NOTES
"ASSESSMENT and PLAN:  1. Pregnancy Intake Appointment  Yusuf Stubbs is 26 year old and .  Patient's last menstrual period was 2021.  Estimated Date of Delivery: 2022  She will be seeing Dr. White for OB care at [facility].  Screening pregnancy lab work was drawn.  Prenatal vitamin prescribed.  Problem list and current medications reviewed and updated.  Dating ultrasound ordered.  Potential exposures/risks discussed: work environment, raw meat/seafood/deli meat, home construction, cats, caffeine.  First trimester genetic screening test was discussed with patient.    She will think about Nuchal translucency ultrasound, will let us know if she wants this done.    Follow up in 4 weeks.    2. Graves disease  3. Hyperthyroidism affecting pregnancy in first trimester  PTU started, MFM referral done.  I sent her a message about starting PTU and MFM referral.  Will check next week to make sure she picked up Rx.  - TSH  - T4, free  - propylthiouracil (PTU) 50 MG tablet; Take 1 tablet (50 mg) by mouth 2 times daily  Dispense: 60 tablet; Refill: 0    4. Covid exposure   tested positive for COVID a few days ago.  He is staying in the basement.  Patient says she is \"under quarantine\" but is here for her visit today.  She has a COVID test result pending.  She is feeling OK thus far.  Standard COVID PPE precautions taken.    HPI:  Yusuf Stubbs is a 26 year old female here for pregnancy intake.  She is .  Patient's last menstrual period was 2021.  History of hyperthyroid, not taking any medications.  Positive home pregnancy test 1 month ago.    Past Medical History:   Diagnosis Date     Endocrine disease      Hypertension      Hyperthyroidism 2016     Precipitous delivery, delivered (current hospitalization) 2016      delivery 2016     Unspecified fetal growth retardation, unspecified (weight) 2013     Urinary tract infection         Past Surgical History:   Procedure " "Laterality Date     NO PAST SURGERIES          Current Outpatient Medications   Medication     metoprolol succinate (TOPROL XL) 25 MG     Prenatal Vit-Fe Fumarate-FA (PRENATAL MULTIVITAMIN W/IRON) 27-0.8 MG tablet     propylthiouracil (PTU) 50 MG tablet     white petrolatum-mineral oil (REFRESH LACRI-LUBE) 56.8-42.5 % Oint     No current facility-administered medications for this visit.        Social History     Socioeconomic History     Marital status: Single     Spouse name: Not on file     Number of children: Not on file     Years of education: Not on file     Highest education level: Not on file   Occupational History     Not on file   Tobacco Use     Smoking status: Former Smoker     Types: Cigarettes     Smokeless tobacco: Never Used   Substance and Sexual Activity     Alcohol use: No     Drug use: No     Sexual activity: Yes     Partners: Male     Birth control/protection: None   Other Topics Concern     Not on file   Social History Narrative     Not on file     Social Determinants of Health     Financial Resource Strain: Not on file   Food Insecurity: Not on file   Transportation Needs: Not on file   Physical Activity: Not on file   Stress: Not on file   Social Connections: Not on file   Intimate Partner Violence: Not on file   Housing Stability: Not on file          OBJECTIVE:  No Known Allergies  /74 (BP Location: Left arm, Patient Position: Sitting, Cuff Size: Adult Regular)   Pulse 69   Temp 98.2  F (36.8  C) (Temporal)   Resp 20   Ht 1.448 m (4' 9\")   Wt 47.2 kg (104 lb)   LMP 09/14/2021   BMI 22.51 kg/m     Body mass index is 22.51 kg/m .     Vital signs stable as recorded above.  General: Patient is alert and oriented x 3, in no apparent distress  Remainder of physical exam deferred to patient's First OB Visit.    Office Visit on 11/16/2021   Component Date Value Ref Range Status     hCG Urine Qualitative 11/16/2021 Positive* Negative Final     Culture 11/16/2021 No Growth   Final     " Hepatitis B Surface Antigen 11/16/2021 Nonreactive  Nonreactive Final     HIV Antigen Antibody Combo 11/16/2021 Negative  Negative Final     Rubella Rosa IgG Instrument Value 11/16/2021 0.75  <0.90 Index Final     Rubella Antibody IgG 11/16/2021 No detectable antibody.  Negative Final     Treponema Antibody Total 11/16/2021 Nonreactive  Nonreactive Final     Hemoglobin A1C 11/16/2021 4.8  0.0 - 5.6 % Final     Hepatitis C Antibody 11/16/2021 Negative  Negative Final     TSH 11/16/2021 <0.01* 0.30 - 5.00 uIU/mL Final     Free T4 11/16/2021 3.00* 0.70 - 1.80 ng/dL Final     ABO/RH(D) 11/16/2021 B POS   Final     Antibody Screen 11/16/2021 Negative  Negative Final     SPECIMEN EXPIRATION DATE 11/16/2021 [id number]   Final     WBC Count 11/16/2021 5.1  4.0 - 11.0 10e3/uL Final     RBC Count 11/16/2021 4.95  3.80 - 5.20 10e6/uL Final     Hemoglobin 11/16/2021 13.1  11.7 - 15.7 g/dL Final     Hematocrit 11/16/2021 39.8  35.0 - 47.0 % Final     MCV 11/16/2021 80  78 - 100 fL Final     MCH 11/16/2021 26.5  26.5 - 33.0 pg Final     MCHC 11/16/2021 32.9  31.5 - 36.5 g/dL Final     RDW 11/16/2021 11.4  10.0 - 15.0 % Final     Platelet Count 11/16/2021 165  150 - 450 10e3/uL Final     % Neutrophils 11/16/2021 71  % Final     % Lymphocytes 11/16/2021 21  % Final     % Monocytes 11/16/2021 8  % Final     % Eosinophils 11/16/2021 0  % Final     % Basophils 11/16/2021 0  % Final     % Immature Granulocytes 11/16/2021 0  % Final     Absolute Neutrophils 11/16/2021 3.6  1.6 - 8.3 10e3/uL Final     Absolute Lymphocytes 11/16/2021 1.1  0.8 - 5.3 10e3/uL Final     Absolute Monocytes 11/16/2021 0.4  0.0 - 1.3 10e3/uL Final     Absolute Eosinophils 11/16/2021 0.0  0.0 - 0.7 10e3/uL Final     Absolute Basophils 11/16/2021 0.0  0.0 - 0.2 10e3/uL Final     Absolute Immature Granulocytes 11/16/2021 0.0  <=0.0 10e3/uL Final        Other screening pregnancy lab work is pending.    Please see OB Episode for complete details.    This dictation  uses voice recognition software, which may contain typographical errors.

## 2021-12-20 ENCOUNTER — TELEPHONE (OUTPATIENT)
Dept: FAMILY MEDICINE | Facility: CLINIC | Age: 26
End: 2021-12-20
Payer: COMMERCIAL

## 2021-12-20 NOTE — TELEPHONE ENCOUNTER
LVM #1 for pt to call back to make an appt ASAP.      Okay to relay message and please schedule pt for a same day appt with      ----- Message from Victorino White MD sent at 12/20/2021  4:30 PM CST -----  Due for prenatal care.  Please schedule ASAP

## 2021-12-21 ENCOUNTER — HOSPITAL ENCOUNTER (OUTPATIENT)
Dept: ULTRASOUND IMAGING | Facility: HOSPITAL | Age: 26
Discharge: HOME | End: 2021-12-21
Attending: FAMILY MEDICINE | Admitting: FAMILY MEDICINE
Payer: COMMERCIAL

## 2021-12-21 ENCOUNTER — HOSPITAL ENCOUNTER (EMERGENCY)
Facility: HOSPITAL | Age: 26
Discharge: HOME | End: 2021-12-21
Attending: EMERGENCY MEDICINE | Admitting: EMERGENCY MEDICINE
Payer: COMMERCIAL

## 2021-12-21 VITALS
HEART RATE: 104 BPM | RESPIRATION RATE: 24 BRPM | OXYGEN SATURATION: 100 % | SYSTOLIC BLOOD PRESSURE: 130 MMHG | WEIGHT: 104 LBS | DIASTOLIC BLOOD PRESSURE: 73 MMHG | BODY MASS INDEX: 22.51 KG/M2 | TEMPERATURE: 98.6 F

## 2021-12-21 DIAGNOSIS — N30.91 HEMORRHAGIC CYSTITIS: ICD-10-CM

## 2021-12-21 DIAGNOSIS — Z34.90 PREGNANT AND NOT YET DELIVERED, UNSPECIFIED TRIMESTER: ICD-10-CM

## 2021-12-21 DIAGNOSIS — O99.281 HYPERTHYROIDISM AFFECTING PREGNANCY IN FIRST TRIMESTER: ICD-10-CM

## 2021-12-21 DIAGNOSIS — E05.90 HYPERTHYROIDISM AFFECTING PREGNANCY IN FIRST TRIMESTER: ICD-10-CM

## 2021-12-21 DIAGNOSIS — E05.90 HYPERTHYROIDISM: ICD-10-CM

## 2021-12-21 DIAGNOSIS — R00.0 SINUS TACHYCARDIA: ICD-10-CM

## 2021-12-21 LAB
ALBUMIN SERPL-MCNC: 3.5 G/DL (ref 3.5–5)
ALBUMIN UR-MCNC: 300 MG/DL
ALP SERPL-CCNC: 115 U/L (ref 45–120)
ALT SERPL W P-5'-P-CCNC: 13 U/L (ref 0–45)
ANION GAP SERPL CALCULATED.3IONS-SCNC: 11 MMOL/L (ref 5–18)
APPEARANCE UR: ABNORMAL
AST SERPL W P-5'-P-CCNC: 18 U/L (ref 0–40)
BACTERIA #/AREA URNS HPF: ABNORMAL /HPF
BASOPHILS # BLD AUTO: 0 10E3/UL (ref 0–0.2)
BASOPHILS NFR BLD AUTO: 0 %
BILIRUB DIRECT SERPL-MCNC: 0.2 MG/DL
BILIRUB SERPL-MCNC: 0.5 MG/DL (ref 0–1)
BILIRUB UR QL STRIP: NEGATIVE
BUN SERPL-MCNC: 7 MG/DL (ref 8–22)
C REACTIVE PROTEIN LHE: <0.1 MG/DL (ref 0–0.8)
CALCIUM SERPL-MCNC: 8.7 MG/DL (ref 8.5–10.5)
CHLORIDE BLD-SCNC: 105 MMOL/L (ref 98–107)
CO2 SERPL-SCNC: 20 MMOL/L (ref 22–31)
COLOR UR AUTO: ABNORMAL
CORTIS SERPL-MCNC: 18.5 UG/DL
CREAT SERPL-MCNC: 0.46 MG/DL (ref 0.6–1.1)
EOSINOPHIL # BLD AUTO: 0 10E3/UL (ref 0–0.7)
EOSINOPHIL NFR BLD AUTO: 0 %
ERYTHROCYTE [DISTWIDTH] IN BLOOD BY AUTOMATED COUNT: 12.3 % (ref 10–15)
GFR SERPL CREATININE-BSD FRML MDRD: >90 ML/MIN/1.73M2
GLUCOSE BLD-MCNC: 108 MG/DL (ref 70–125)
GLUCOSE UR STRIP-MCNC: 30 MG/DL
HCT VFR BLD AUTO: 38.6 % (ref 35–47)
HGB BLD-MCNC: 12.9 G/DL (ref 11.7–15.7)
HGB UR QL STRIP: ABNORMAL
IMM GRANULOCYTES # BLD: 0 10E3/UL
IMM GRANULOCYTES NFR BLD: 0 %
KETONES UR STRIP-MCNC: NEGATIVE MG/DL
LACTATE SERPL-SCNC: 1.9 MMOL/L (ref 0.7–2)
LEUKOCYTE ESTERASE UR QL STRIP: ABNORMAL
LIPASE SERPL-CCNC: 9 U/L (ref 0–52)
LYMPHOCYTES # BLD AUTO: 2 10E3/UL (ref 0.8–5.3)
LYMPHOCYTES NFR BLD AUTO: 21 %
MAGNESIUM SERPL-MCNC: 1.6 MG/DL (ref 1.8–2.6)
MCH RBC QN AUTO: 26.9 PG (ref 26.5–33)
MCHC RBC AUTO-ENTMCNC: 33.4 G/DL (ref 31.5–36.5)
MCV RBC AUTO: 81 FL (ref 78–100)
MONOCYTES # BLD AUTO: 0.6 10E3/UL (ref 0–1.3)
MONOCYTES NFR BLD AUTO: 6 %
MUCOUS THREADS #/AREA URNS LPF: PRESENT /LPF
NEUTROPHILS # BLD AUTO: 7 10E3/UL (ref 1.6–8.3)
NEUTROPHILS NFR BLD AUTO: 73 %
NITRATE UR QL: NEGATIVE
NRBC # BLD AUTO: 0 10E3/UL
NRBC BLD AUTO-RTO: 0 /100
PH UR STRIP: 6 [PH] (ref 5–7)
PLATELET # BLD AUTO: 194 10E3/UL (ref 150–450)
POTASSIUM BLD-SCNC: 3.2 MMOL/L (ref 3.5–5)
PROT SERPL-MCNC: 6.9 G/DL (ref 6–8)
RBC # BLD AUTO: 4.79 10E6/UL (ref 3.8–5.2)
RBC URINE: >182 /HPF
SODIUM SERPL-SCNC: 136 MMOL/L (ref 136–145)
SP GR UR STRIP: 1.02 (ref 1–1.03)
SQUAMOUS EPITHELIAL: 21 /HPF
TSH SERPL DL<=0.005 MIU/L-ACNC: <0.01 UIU/ML (ref 0.3–5)
UROBILINOGEN UR STRIP-MCNC: <2 MG/DL
WBC # BLD AUTO: 9.6 10E3/UL (ref 4–11)
WBC URINE: >182 /HPF

## 2021-12-21 PROCEDURE — 96361 HYDRATE IV INFUSION ADD-ON: CPT

## 2021-12-21 PROCEDURE — 258N000003 HC RX IP 258 OP 636: Performed by: EMERGENCY MEDICINE

## 2021-12-21 PROCEDURE — 83605 ASSAY OF LACTIC ACID: CPT | Performed by: EMERGENCY MEDICINE

## 2021-12-21 PROCEDURE — 82533 TOTAL CORTISOL: CPT | Performed by: EMERGENCY MEDICINE

## 2021-12-21 PROCEDURE — 250N000013 HC RX MED GY IP 250 OP 250 PS 637: Performed by: EMERGENCY MEDICINE

## 2021-12-21 PROCEDURE — 87086 URINE CULTURE/COLONY COUNT: CPT | Performed by: EMERGENCY MEDICINE

## 2021-12-21 PROCEDURE — 93005 ELECTROCARDIOGRAM TRACING: CPT | Performed by: EMERGENCY MEDICINE

## 2021-12-21 PROCEDURE — 36415 COLL VENOUS BLD VENIPUNCTURE: CPT | Performed by: EMERGENCY MEDICINE

## 2021-12-21 PROCEDURE — 96365 THER/PROPH/DIAG IV INF INIT: CPT

## 2021-12-21 PROCEDURE — 76801 OB US < 14 WKS SINGLE FETUS: CPT

## 2021-12-21 PROCEDURE — 84439 ASSAY OF FREE THYROXINE: CPT | Performed by: EMERGENCY MEDICINE

## 2021-12-21 PROCEDURE — 250N000011 HC RX IP 250 OP 636: Performed by: EMERGENCY MEDICINE

## 2021-12-21 PROCEDURE — 83735 ASSAY OF MAGNESIUM: CPT | Performed by: EMERGENCY MEDICINE

## 2021-12-21 PROCEDURE — 82248 BILIRUBIN DIRECT: CPT | Performed by: EMERGENCY MEDICINE

## 2021-12-21 PROCEDURE — 87040 BLOOD CULTURE FOR BACTERIA: CPT | Performed by: EMERGENCY MEDICINE

## 2021-12-21 PROCEDURE — 83690 ASSAY OF LIPASE: CPT | Performed by: EMERGENCY MEDICINE

## 2021-12-21 PROCEDURE — 84443 ASSAY THYROID STIM HORMONE: CPT | Performed by: EMERGENCY MEDICINE

## 2021-12-21 PROCEDURE — 86141 C-REACTIVE PROTEIN HS: CPT | Performed by: EMERGENCY MEDICINE

## 2021-12-21 PROCEDURE — 85025 COMPLETE CBC W/AUTO DIFF WBC: CPT | Performed by: EMERGENCY MEDICINE

## 2021-12-21 PROCEDURE — 99285 EMERGENCY DEPT VISIT HI MDM: CPT | Mod: 25

## 2021-12-21 PROCEDURE — 81001 URINALYSIS AUTO W/SCOPE: CPT | Performed by: EMERGENCY MEDICINE

## 2021-12-21 RX ORDER — CEFTRIAXONE 2 G/1
2 INJECTION, POWDER, FOR SOLUTION INTRAMUSCULAR; INTRAVENOUS ONCE
Status: COMPLETED | OUTPATIENT
Start: 2021-12-21 | End: 2021-12-21

## 2021-12-21 RX ORDER — POTASSIUM CHLORIDE 1.5 G/1.58G
40 POWDER, FOR SOLUTION ORAL ONCE
Status: COMPLETED | OUTPATIENT
Start: 2021-12-21 | End: 2021-12-21

## 2021-12-21 RX ORDER — MAGNESIUM OXIDE 400 MG/1
800 TABLET ORAL ONCE
Status: COMPLETED | OUTPATIENT
Start: 2021-12-21 | End: 2021-12-21

## 2021-12-21 RX ORDER — CEFDINIR 300 MG/1
300 CAPSULE ORAL 2 TIMES DAILY
Qty: 20 CAPSULE | Refills: 0 | Status: SHIPPED | OUTPATIENT
Start: 2021-12-21 | End: 2021-12-31

## 2021-12-21 RX ADMIN — POTASSIUM CHLORIDE FOR ORAL SOLUTION 40 MEQ: 1.5 POWDER, FOR SOLUTION ORAL at 20:31

## 2021-12-21 RX ADMIN — SODIUM CHLORIDE 1000 ML: 9 INJECTION, SOLUTION INTRAVENOUS at 19:18

## 2021-12-21 RX ADMIN — MAGNESIUM OXIDE TAB 400 MG (241.3 MG ELEMENTAL MG) 800 MG: 400 (241.3 MG) TAB at 20:30

## 2021-12-21 RX ADMIN — CEFTRIAXONE SODIUM 2 G: 2 INJECTION, POWDER, FOR SOLUTION INTRAMUSCULAR; INTRAVENOUS at 20:11

## 2021-12-21 ASSESSMENT — ENCOUNTER SYMPTOMS
COLOR CHANGE: 0
FREQUENCY: 1
SHORTNESS OF BREATH: 0
NAUSEA: 1
DYSURIA: 1
CONFUSION: 0
BACK PAIN: 0
ARTHRALGIAS: 0
NECK STIFFNESS: 0
VOMITING: 1
HEMATURIA: 0
HEADACHES: 0
EYE REDNESS: 0
FEVER: 0
FLANK PAIN: 0
DIFFICULTY URINATING: 1
ABDOMINAL PAIN: 0

## 2021-12-22 LAB
ATRIAL RATE - MUSE: 118 BPM
DIASTOLIC BLOOD PRESSURE - MUSE: NORMAL MMHG
INTERPRETATION ECG - MUSE: NORMAL
P AXIS - MUSE: 47 DEGREES
PR INTERVAL - MUSE: 148 MS
QRS DURATION - MUSE: 78 MS
QT - MUSE: 316 MS
QTC - MUSE: 442 MS
R AXIS - MUSE: 77 DEGREES
SYSTOLIC BLOOD PRESSURE - MUSE: NORMAL MMHG
T AXIS - MUSE: 55 DEGREES
T4 FREE SERPL-MCNC: 3.1 NG/DL (ref 0.7–1.8)
VENTRICULAR RATE- MUSE: 118 BPM

## 2021-12-22 NOTE — ED PROVIDER NOTES
ED Provider In Triage Note  [facility]  Encounter Date: Dec 21, 2021    No chief complaint on file.      Brief HPI:   Yusuf Stubbs is a 26 year old female presenting to the Emergency Department with a chief complaint of urinary difficulty, frequency, and dysuria that started 4 days ago. Patient is 13 weeks pregnant.  Patient denies any vaginal bleeding or discharge, abdominal pain, back pain, or fevers.      In triage the patient's heart rate was in the 140-160s and she was hypertensive.  The patient states she has a history of hyperthyroidism and that her heart rate is always fast.  A chart review shows that a pelvic US was ordered earlier today out of concerns that her hyperthyroidism was affecting her pregnancy.          Brief Physical Exam:  LMP 09/14/2021   General: Non-toxic appearing  HEENT: Atraumatic  Resp: No respiratory distress  Abdomen: Non-peritoneal  Neuro: Alert, oriented, answers questions appropriately  Psych: Behavior appropriate    Plan Initiated in Triage:  UA, EKG, labs      PIT Dispo:   Return to lobby while awaiting workup and ED bed availability    Thais Pedersen DO on 12/21/2021 at 6:43 PM    Patient was evaluated by the Physician in Triage due to a limitation of available rooms in the Emergency Department. A plan of care was discussed based on the information obtained on the initial evaluation and patient was consuled to return back to the Emergency Department lobby after this initial evalutaiton until results were obtained or a room became available in the Emergency Department. Patient was counseled not to leave prior to receiving the results of their workup.        Thais Pedersen DO  12/21/21 1847

## 2021-12-22 NOTE — DISCHARGE INSTRUCTIONS
Take the antibiotic (cefdinir) as prescribed for your bladder infection.    Use Tylenol as needed for any pain.    Drink plenty of fluids to stay hydrated.    Follow up with your Primary Care provider in 2 days for a recheck. They may want to discuss your thyroid control.    Return to the Emergency Department for any persistent vomiting, severe worsening, or any other concerns.

## 2021-12-22 NOTE — ED NOTES
Pt able to give urine sample, urine appears red and cloudy. Patient states this is the first time she has had a bloody urine. Denies any pain at this time.

## 2021-12-22 NOTE — ED PROVIDER NOTES
"EMERGENCY DEPARTMENT ENCOUNTER      NAME: Yusuf Stubbs  AGE: 26 year old female  YOB: 1995  MRN: [id number]  EVALUATION DATE & TIME: 2021  6:48 PM    PCP: Victorino White    ED PROVIDER: Kunal Denny M.D.      Chief Complaint   Patient presents with     Difficulty Urinating         IMPRESSION  1. Hemorrhagic cystitis    2. Pregnant and not yet delivered, unspecified trimester    3. Hyperthyroidism    4. Sinus tachycardia        PLAN  - cefdinir 300mg q12h x10 days  - close PCP follow up  - discharge to home    ED COURSE & MEDICAL DECISION MAKING    ED Course as of 12/21/21 2138   Tue Dec 21, 2021   [birth year]08 26yoF  currently 13w0d (had US earlier today with ruiz IUP) with history of HTN, hyperthyroidism (takes PTU) presenting for evaluation of dysuria. Reports 4 days of dysuria, urgency, & frequency; no abdominal pain, flank pain, fevers, sweats, chills. No vaginal bleeding or discharge. States she has been taking her blood pressure and hyperthyroid medication as prescribed. Notes she had the OB US this morning without issue. Came for ongoing urinary symptoms. Notes \"my heart rate is always high\". Denies any chest pain, shortness of breath, cough, any other problems or complaints at this time.    HR 140s on presentation with BP 170s/90s, otherwise normal vitals. Calm on exam with clear lungs, normal work of breathing, normal mentation, no agitation, mild suprapubic fullness with no abdominal tenderness whatsoever, no CVA tenderness, no peripheral edema or calf tenderness, normal neuro exam. High concern for UTI with possible pyelo given notable tachycardia. At risk to developed thyroid storm with history but no fever, no CNS effects, no GI issues, no suggestion of CHF; will certainly check TSH though. Doubt primary pregnancy issue with no pelvic pain, no abdominal pain, no vaginal bleeding or discharge; doubt benefit to pelvic exam at this time. Will give IVF while obtaining blood, urine, " EKG. Patient comfortable with this plan; no further questions at this time.   1926 HR 90s at this time as IVF infusing. EKG already obtained with no ischemic changes (HR 118bpm at time of EKG). No labs back yet.   1935 Bladder scan only 125mL; patient able to urinate after this as well. Doubt acute urinary retention ongoing.   1949 UA suggestive of UTI with leuk esterase, bacteria, blood; will give Rocephin to cover usual bacteria. WBC 12 with negative lactate; does not meet sepsis criteria at this time.   2031 BP normalized at this time at 120s/80s; HR 90s, satting 100% on room air.   2035 TSH undetectable with Ft4 pending; TSH has been <0.01 going back 2 years though.   2113 Patient asymptomatic on recheck with HR 90s, tolerating PO. Continues to have benign abdomen and no CVA tenderness. Reasonable to trial outpatient antibiotics for hemorrhagic cystitis. No concern for ureteral stone. Patient wants to go home; will cover with cefdinir going forward. Patient able to tolerate PO and walk without difficulty. Patient comfortable with discharge at this time. Return precautions and need for PCP follow up discussed and understood. No further questions at the time of discharge.       7:02 PM I met with the patient for the initial interview and physical examination. Discussed plan for treatment and workup in the ED.  8:30 PM I discussed the plan for discharge with the patient, and patient is agreeable. We discussed supportivecares at home and reasons for return to the ER including new or worsening symptoms - all questions and concerns addressed. Patient to be discharged by RN.     This patient involved a high degree of complexity in medical decision making, as significant risks were present and assessed.    I wore the following PPE during this patient encounter:  N95 mask, face shield w/ eye protection, gloves.    MEDICATIONS GIVEN IN THE EMERGENCY DEPARTMENT  Medications   0.9% sodium chloride BOLUS (0 mLs Intravenous  Stopped 12/21/21 2010)   cefTRIAXone (ROCEPHIN) 2 g vial to attach to  ml bag for ADULTS or NS 50 ml bag for PEDS (0 g Intravenous Stopped 12/21/21 2111)   potassium chloride (KLOR-CON) Packet 40 mEq (40 mEq Oral Given 12/21/21 2031)   magnesium oxide (MAG-OX) tablet 800 mg (800 mg Oral Given 12/21/21 2030)       NEW PRESCRIPTIONS STARTED AT TODAY'S ER VISIT  Discharge Medication List as of 12/21/2021  9:12 PM      START taking these medications    Details   cefdinir (OMNICEF) 300 MG capsule Take 1 capsule (300 mg) by mouth 2 times daily for 10 days, Disp-20 capsule, R-0, E-Prescribe         CONTINUE these medications which have NOT CHANGED    Details   metoprolol succinate (TOPROL XL) 25 MG [METOPROLOL SUCCINATE (TOPROL XL) 25 MG] Take 1 tablet (25 mg total) by mouth daily., Disp-60 tablet, R-0, Normal      Prenatal Vit-Fe Fumarate-FA (PRENATAL MULTIVITAMIN W/IRON) 27-0.8 MG tablet Take 1 tablet by mouth daily, Disp-90 tablet, R-3, E-Prescribe      propylthiouracil (PTU) 50 MG tablet Take 1 tablet (50 mg) by mouth 2 times daily, Disp-60 tablet, R-0, E-Prescribe      white petrolatum-mineral oil (REFRESH LACRI-LUBE) 56.8-42.5 % Oint [WHITE PETROLATUM-MINERAL OIL (REFRESH LACRI-LUBE) 56.8-42.5 % OINT] Apply to eyelid/eye at night, R-0, OTC                 =================================================================      HPI  Patient information was obtained from: the patient    Use of : N/A      Yusuf Stubbs is a 26 year old female with a pertinent history of graves disease, UTI, hypertension who presents to this ED by personal vehicle for evaluation of difficulty urinating.     The patient reports a slight pinching sensation with urination starting 12/18/21.  Today, she endorses worsened burning with urination.  She also reports difficulty starting urination.      She is 13 weeks pregnant with her 4th pregnancy.  She does endorse vomiting, but reports that happens when she is pregnant. She had an  US this AM.      The patient denies abdominal pain, back pain, vaginal bleeding, vaginal discharge, chest pain, shortness of breath, fever, and any other symptoms or complaints at this time.    MHx: she has a history of thyroid problems, high heart rate, hypertension.       REVIEW OF SYSTEMS   Review of Systems   Constitutional: Negative for fever.   HENT: Negative for congestion.    Eyes: Negative for redness.   Respiratory: Negative for shortness of breath.    Cardiovascular: Negative for chest pain.   Gastrointestinal: Positive for nausea (during pregnacy) and vomiting (during pregnancy). Negative for abdominal pain.   Genitourinary: Positive for difficulty urinating (starting), dysuria and frequency. Negative for flank pain, hematuria, vaginal bleeding and vaginal discharge.   Musculoskeletal: Negative for arthralgias, back pain and neck stiffness.   Skin: Negative for color change.   Neurological: Negative for headaches.   Psychiatric/Behavioral: Negative for confusion.   All other systems reviewed and are negative.          --------------- MEDICAL HISTORY ---------------  PAST MEDICAL HISTORY:  Past Medical History:   Diagnosis Date     Endocrine disease      Hypertension      Hyperthyroidism 2016     Precipitous delivery, delivered (current hospitalization) 2016      delivery 2016     Unspecified fetal growth retardation, unspecified (weight) 2013     Urinary tract infection      Patient Active Problem List   Diagnosis     Tachycardia     Hepatitis A vaccination not up to date     Hyperthyroidism     Not immune to hepatitis B virus     Susceptible to varicella (non-immune), currently pregnant     Hyperthyroidism affecting pregnancy     Graves disease     Rubella non-immune status, antepartum     9 weeks gestation of pregnancy     Lab test positive for detection of COVID-19 virus       PAST SURGICAL HISTORY:  Past Surgical History:   Procedure Laterality Date     NO PAST SURGERIES          CURRENT MEDICATIONS:    No current facility-administered medications for this encounter.    Current Outpatient Medications:      cefdinir (OMNICEF) 300 MG capsule, Take 1 capsule (300 mg) by mouth 2 times daily for 10 days, Disp: 20 capsule, Rfl: 0     metoprolol succinate (TOPROL XL) 25 MG, [METOPROLOL SUCCINATE (TOPROL XL) 25 MG] Take 1 tablet (25 mg total) by mouth daily., Disp: 60 tablet, Rfl: 0     Prenatal Vit-Fe Fumarate-FA (PRENATAL MULTIVITAMIN W/IRON) 27-0.8 MG tablet, Take 1 tablet by mouth daily, Disp: 90 tablet, Rfl: 3     propylthiouracil (PTU) 50 MG tablet, Take 1 tablet (50 mg) by mouth 2 times daily, Disp: 60 tablet, Rfl: 0     white petrolatum-mineral oil (REFRESH LACRI-LUBE) 56.8-42.5 % Oint, [WHITE PETROLATUM-MINERAL OIL (REFRESH LACRI-LUBE) 56.8-42.5 % OINT] Apply to eyelid/eye at night, Disp: , Rfl: 0    ALLERGIES:  No Known Allergies    FAMILY HISTORY:  Family History   Problem Relation Age of Onset     No Known Problems Father      Thyroid Disease Mother      Mental retardation Brother        SOCIAL HISTORY:   Social History     Socioeconomic History     Marital status: Single     Spouse name: Not on file     Number of children: Not on file     Years of education: Not on file     Highest education level: Not on file   Occupational History     Not on file   Tobacco Use     Smoking status: Former Smoker     Types: Cigarettes     Smokeless tobacco: Never Used   Substance and Sexual Activity     Alcohol use: No     Drug use: No     Sexual activity: Yes     Partners: Male     Birth control/protection: None   Other Topics Concern     Not on file   Social History Narrative     Not on file     Social Determinants of Health     Financial Resource Strain: Not on file   Food Insecurity: Not on file   Transportation Needs: Not on file   Physical Activity: Not on file   Stress: Not on file   Social Connections: Not on file   Intimate Partner Violence: Not on file   Housing Stability: Not on file          --------------- PHYSICAL EXAM ---------------  Nursing notes and vitals reviewed by me.  VITALS:  Vitals:    12/21/21 2015 12/21/21 2030 12/21/21 2045 12/21/21 2047   BP: 128/84 122/76 130/73    Pulse: 105 102 104    Resp: 28   24   Temp:       TempSrc:       SpO2: 100% 100% 100%    Weight:           PHYSICAL EXAM:    General:  alert, interactive, no distress  Eyes:  conjunctivae clear, conjugate gaze   HENT:  atraumatic, nose with no rhinorrhea, oropharynx clear  Neck:  no meningismus  Cardiovascular:  HR 130s during exam, regular rhythm, no murmurs, brisk cap refill  Chest:  no chest wall tenderness  Pulmonary:  no stridor, normal phonation, normal work of breathing, clear lungs bilaterally  Abdomen:  Soft. Abdomen mild suprapubic fullness without tenderness.   :  no CVA tenderness  Back:  no midline spinal tenderness  Musculoskeletal:  no pretibial edema, no calf tenderness. Gross ROM intact to joints of extremities with no obvious deformities.  Skin:  warm, dry, no rash  Neuro:  awake, alert, answers questions appropriately, follows commands, moves all limbs  Psych:  calm, normal affect      --------------- RESULTS ---------------  EKG:  sinus tachycardia at 118bpm, no ST or T wave changes, normal intervals  Unchanged from prior (sinus tachycardia at 119bpm) on 1/29/2016  My read    LAB:  Reviewed and interpreted by me.  Results for orders placed or performed during the hospital encounter of 12/21/21   UA with Microscopic reflex to Culture    Specimen: Urine, Clean Catch   Result Value Ref Range    Color Urine Brown (A) Colorless, Straw, Light Yellow, Yellow    Appearance Urine Cloudy (A) Clear    Glucose Urine 30  (A) Negative mg/dL    Bilirubin Urine Negative Negative    Ketones Urine Negative Negative mg/dL    Specific Gravity Urine 1.022 1.001 - 1.030    Blood Urine >1.0 mg/dL (A) Negative    pH Urine 6.0 5.0 - 7.0    Protein Albumin Urine 300  (A) Negative mg/dL    Urobilinogen Urine <2.0 <2.0  mg/dL    Nitrite Urine Negative Negative    Leukocyte Esterase Urine 500 Margarito/uL (A) Negative    Bacteria Urine Many (A) None Seen /HPF    Mucus Urine Present (A) None Seen /LPF    RBC Urine >182 (H) <=2 /HPF    WBC Urine >182 (H) <=5 /HPF    Squamous Epithelials Urine 21 (H) <=1 /HPF   Basic metabolic panel   Result Value Ref Range    Sodium 136 136 - 145 mmol/L    Potassium 3.2 (L) 3.5 - 5.0 mmol/L    Chloride 105 98 - 107 mmol/L    Carbon Dioxide (CO2) 20 (L) 22 - 31 mmol/L    Anion Gap 11 5 - 18 mmol/L    Urea Nitrogen 7 (L) 8 - 22 mg/dL    Creatinine 0.46 (L) 0.60 - 1.10 mg/dL    Calcium 8.7 8.5 - 10.5 mg/dL    Glucose 108 70 - 125 mg/dL    GFR Estimate >90 >60 mL/min/1.73m2   Result Value Ref Range    Magnesium 1.6 (L) 1.8 - 2.6 mg/dL   TSH with free T4 reflex   Result Value Ref Range    TSH <0.01 (L) 0.30 - 5.00 uIU/mL   CBC with platelets and differential   Result Value Ref Range    WBC Count 9.6 4.0 - 11.0 10e3/uL    RBC Count 4.79 3.80 - 5.20 10e6/uL    Hemoglobin 12.9 11.7 - 15.7 g/dL    Hematocrit 38.6 35.0 - 47.0 %    MCV 81 78 - 100 fL    MCH 26.9 26.5 - 33.0 pg    MCHC 33.4 31.5 - 36.5 g/dL    RDW 12.3 10.0 - 15.0 %    Platelet Count 194 150 - 450 10e3/uL    % Neutrophils 73 %    % Lymphocytes 21 %    % Monocytes 6 %    % Eosinophils 0 %    % Basophils 0 %    % Immature Granulocytes 0 %    NRBCs per 100 WBC 0 <1 /100    Absolute Neutrophils 7.0 1.6 - 8.3 10e3/uL    Absolute Lymphocytes 2.0 0.8 - 5.3 10e3/uL    Absolute Monocytes 0.6 0.0 - 1.3 10e3/uL    Absolute Eosinophils 0.0 0.0 - 0.7 10e3/uL    Absolute Basophils 0.0 0.0 - 0.2 10e3/uL    Absolute Immature Granulocytes 0.0 <=0.4 10e3/uL    Absolute NRBCs 0.0 10e3/uL   Hepatic function panel   Result Value Ref Range    Bilirubin Total 0.5 0.0 - 1.0 mg/dL    Bilirubin Direct 0.2 <=0.5 mg/dL    Protein Total 6.9 6.0 - 8.0 g/dL    Albumin 3.5 3.5 - 5.0 g/dL    Alkaline Phosphatase 115 45 - 120 U/L    AST 18 0 - 40 U/L    ALT 13 0 - 45 U/L   CRP  inflammation   Result Value Ref Range    CRP <0.1 0.0-<0.8 mg/dL   Result Value Ref Range    Lipase 9 0 - 52 U/L   Lactic acid whole blood   Result Value Ref Range    Lactic Acid 1.9 0.7 - 2.0 mmol/L   Cortisol   Result Value Ref Range    Cortisol 18.5 ug/dL       RADIOLOGY:  Reviewed by me. Please see official radiology report.  Recent Results (from the past 24 hour(s))   US OB < 14 Weeks [facility]  ULTRASOUND EARLY OBSTETRICAL < 14 weeks      12/21/2021 11:00 AM    INDICATION: date pregnancy  TECHNIQUE: Transabdominal and endovaginal ultrasound.   COMPARISON: None.     FINDINGS:   UTERUS: Single living intrauterine pregnancy.    CRL measures 6.7 cm corresponding to 13 weeks, 0 days gestation.  EDC: 06/28/2022  FETAL HEART RATE: 167 beats per minute  AMNIONIC FLUID: Normal.  PLACENTA: Not yet formed.  YOLK SAC: Normal  No sonographic abnormalities.    RIGHT OVARY:  Normal.  LEFT OVARY: Normal.      Impression    CONCLUSION:   1. Single living intrauterine pregnancy at 13 weeks, 0 days gestation.  2. EDC is 06/28/2022.  3. No abnormalities.         PROCEDURES:   Procedures   --------------------------------------------------------------------------------   Cardiac telemetry monitoring ordered, reviewed, and interpreted by me while patient was in the Emergency Department. Revealed sinus tachycardia with otherwise no acute abnormalities.  --------------------------------------------------------------------------------           I, Sesar Jones, am serving as a scribe to document services personally performed by Dr. Kunal Denny based on my observation and the provider's statements to me. I, Kunal Denny MD attest that Sesar Jones is acting in a scribe capacity, has observed my performance of the services and has documented them in accordance with my direction.      Kunal Denny MD  12/21/21  Emergency Medicine  [facility] EMERGENCY  DEPARTMENT  [address] [place] [id number]  [phone]  Dept: [phone]     Kunal Denny MD  12/21/21 2139       Kunla Denny MD  12/21/21 3239

## 2021-12-22 NOTE — ED TRIAGE NOTES
Pt reports being 13 weeks pregnant. Has difficulty urinating. No abdominal pain, fevers, or back pain. Tachycardic in triage and hypertensive. Does not take medications for blood pressure.

## 2021-12-23 LAB — BACTERIA UR CULT: NORMAL

## 2021-12-27 LAB
BACTERIA BLD CULT: NO GROWTH
BACTERIA BLD CULT: NO GROWTH

## 2021-12-29 DIAGNOSIS — O99.281 HYPERTHYROIDISM AFFECTING PREGNANCY IN FIRST TRIMESTER: Primary | ICD-10-CM

## 2021-12-29 DIAGNOSIS — E05.90 HYPERTHYROIDISM AFFECTING PREGNANCY IN FIRST TRIMESTER: Primary | ICD-10-CM

## 2022-01-05 ENCOUNTER — PRENATAL OFFICE VISIT (OUTPATIENT)
Dept: FAMILY MEDICINE | Facility: CLINIC | Age: 27
End: 2022-01-05
Payer: COMMERCIAL

## 2022-01-05 ENCOUNTER — TELEPHONE (OUTPATIENT)
Dept: ENDOCRINOLOGY | Facility: CLINIC | Age: 27
End: 2022-01-05

## 2022-01-05 ENCOUNTER — HOSPITAL ENCOUNTER (OUTPATIENT)
Facility: CLINIC | Age: 27
End: 2022-01-05
Attending: OBSTETRICS & GYNECOLOGY | Admitting: OBSTETRICS & GYNECOLOGY
Payer: COMMERCIAL

## 2022-01-05 ENCOUNTER — TELEPHONE (OUTPATIENT)
Dept: MATERNAL FETAL MEDICINE | Facility: CLINIC | Age: 27
End: 2022-01-05

## 2022-01-05 VITALS
WEIGHT: 102 LBS | BODY MASS INDEX: 22.07 KG/M2 | OXYGEN SATURATION: 99 % | HEART RATE: 115 BPM | RESPIRATION RATE: 16 BRPM | DIASTOLIC BLOOD PRESSURE: 76 MMHG | SYSTOLIC BLOOD PRESSURE: 128 MMHG

## 2022-01-05 DIAGNOSIS — O99.281 HYPERTHYROIDISM AFFECTING PREGNANCY IN FIRST TRIMESTER: ICD-10-CM

## 2022-01-05 DIAGNOSIS — Z34.92 ENCOUNTER FOR SUPERVISION OF NORMAL PREGNANCY IN SECOND TRIMESTER, UNSPECIFIED GRAVIDITY: Primary | ICD-10-CM

## 2022-01-05 DIAGNOSIS — Z87.51 HISTORY OF PRETERM DELIVERY: ICD-10-CM

## 2022-01-05 DIAGNOSIS — E05.90 HYPERTHYROIDISM: ICD-10-CM

## 2022-01-05 DIAGNOSIS — E05.00 GRAVES DISEASE: ICD-10-CM

## 2022-01-05 DIAGNOSIS — E05.90 HYPERTHYROIDISM AFFECTING PREGNANCY IN FIRST TRIMESTER: ICD-10-CM

## 2022-01-05 DIAGNOSIS — R00.0 TACHYCARDIA: Primary | ICD-10-CM

## 2022-01-05 LAB
ALBUMIN UR-MCNC: NEGATIVE MG/DL
GLUCOSE UR STRIP-MCNC: NEGATIVE MG/DL
KETONES UR STRIP-MCNC: ABNORMAL MG/DL

## 2022-01-05 PROCEDURE — 99213 OFFICE O/P EST LOW 20 MIN: CPT | Performed by: FAMILY MEDICINE

## 2022-01-05 PROCEDURE — 81003 URINALYSIS AUTO W/O SCOPE: CPT | Performed by: FAMILY MEDICINE

## 2022-01-05 PROCEDURE — 99207 PR PRENATAL VISIT: CPT | Performed by: FAMILY MEDICINE

## 2022-01-05 RX ORDER — METHIMAZOLE 5 MG/1
5 TABLET ORAL 3 TIMES DAILY
Qty: 90 TABLET | Refills: 0 | Status: SHIPPED | OUTPATIENT
Start: 2022-01-05 | End: 2022-01-30

## 2022-01-05 RX ORDER — METOPROLOL SUCCINATE 25 MG/1
25 TABLET, EXTENDED RELEASE ORAL DAILY
Qty: 60 TABLET | Refills: 0 | Status: SHIPPED | OUTPATIENT
Start: 2022-01-05 | End: 2022-02-04

## 2022-01-05 NOTE — TELEPHONE ENCOUNTER
----- Message from Judith Garduno MD sent at 1/5/2022  4:13 PM CST -----  Pt pregnant (16 weeks) with hyperthyroidism - primary MD is putting in orders for methimazole 5 mg TID - currently scheduled to see Sadia in April, can we get her in sooner?

## 2022-01-05 NOTE — TELEPHONE ENCOUNTER
Phone call to pt re ASAP consult that came to clinic. After review of chart by Dr. Champion and Dr. Maurer, pt called to offer to come into Labor and Delivery triage to be assessed and worked up. Message left with PCC line number to call back. Call also placed to pt PCP Dr. White office to make them aware. Will await pt to call back. Referral has also been made to Endocrine for ASAP referral by PCP. Godfrey RN, from Labor and Delivery also aware pt may be coming in. Rosangela Cruz, JARAD

## 2022-01-05 NOTE — PROGRESS NOTES
Subjective:  26 year old  at 16w1d with concerns of her first visit with physician for prenatal care. She has been referred to endocrinology and maternal-fetal medicine in the past. She has been fairly difficult to get a hold of and so has not followed through with those visits or has not been contacted with them. She states she was waiting for a call but never received one.  I confirmed her phone # today.    She reports she has been taking propylthiouracil at 50 mg daily. She has not been taking metoprolol.    She feels fairly tired. She states that walking through a store wears her out. She is not working. 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    Outpatient Medications Prior to Visit   Medication Sig Dispense Refill     metoprolol succinate (TOPROL XL) 25 MG [METOPROLOL SUCCINATE (TOPROL XL) 25 MG] Take 1 tablet (25 mg total) by mouth daily. 60 tablet 0     Prenatal Vit-Fe Fumarate-FA (PRENATAL MULTIVITAMIN W/IRON) 27-0.8 MG tablet Take 1 tablet by mouth daily 90 tablet 3     propylthiouracil (PTU) 50 MG tablet Take 1 tablet (50 mg) by mouth 2 times daily 60 tablet 0     white petrolatum-mineral oil (REFRESH LACRI-LUBE) 56.8-42.5 % Oint [WHITE PETROLATUM-MINERAL OIL (REFRESH LACRI-LUBE) 56.8-42.5 % OINT] Apply to eyelid/eye at night  0     No facility-administered medications prior to visit.      History   Smoking Status     Former Smoker     Types: Cigarettes   Smokeless Tobacco     Never Used      TSH   Date Value Ref Range Status   2021 <0.01 (L) 0.30 - 5.00 uIU/mL Final      Hemoglobin   Date Value Ref Range Status   2021 12.9 11.7 - 15.7 g/dL Final     Lab Results   Component Value Date    ALT 13 2021     AST   Date Value Ref Range Status   2021 18 0 - 40 U/L Final        Objective:  /76   Pulse 115   Resp 16   Wt 46.3 kg (102 lb)   LMP 2021   SpO2 99%   BMI 22.07 kg/m    GENERAL: alert, not distressed  EYES: Mild proptosis  CHEST: clear, no  rales, rhonchi, or wheezes  CARDIAC: regular without murmur, gallop, or rub  ABDOMEN: gravid, soft, non tender, non distended, normal bowel sounds    Recent Results (from the past 24 hour(s))   Urinalysis, OB Screen    Collection Time: 22  9:45 AM   Result Value Ref Range    Glucose Urine Negative Negative mg/dL    Ketones Urine Trace (A) Negative mg/dL    Protein Albumin Urine Negative Negative mg/dL      Assessment and Plan:   1. Encounter for supervision of normal pregnancy in second trimester, unspecified   Losing weigh.  Nausea with recent er visit.  Taking meds for UTI.      2. Graves disease  3. Hyperthyroidism affecting pregnancy in first trimester    Discussed that usually patients transition to methimazole during this time..  There is some better safety data for that over propylthiouracil for the second and third trimester.  Patient states she was taking PTU but she did not have a controlled TSH.  I discussed dosings with endocrinology and will start her on methimazole at 5 mg 3 times per day.    We also discussed how starting metoprolol and slowing her heart rate may improve her fatigue and exercise tolerance.  - Adult Endocrinology Referral; Future  - [facility] Referral - Pregnancy; Future  - methimazole (TAPAZOLE) 5 MG tablet; Take 1 tablet (5 mg) by mouth 3 times daily  Dispense: 90 tablet; Refill: 0  - metoprolol succinate ER (TOPROL-XL) 25 MG 24 hr tablet; Take 1 tablet (25 mg) by mouth daily  Dispense: 60 tablet; Refill: 0    4. History of  delivery  We discussed progesterone prevention of  delivery.  We discussed this with previous pregnancies.  Patient has been difficult to get a hold of and does have difficulty in maintaining follow-up.  She states she does not want to start progesterone to decrease her risk of  labor and does not think she would be able to maintain a weekly visit scheduled to receive it.  Based on previous visit history with her, I  agree that she is not likely to adhere to a weekly schedule.  - [facility] Referral - Pregnancy; Future   Repeat UC    Future Appointments   Date Time Provider Department Center   1/12/2022 10:30 AM Matthew Echols MD [facility]   2/1/2022  1:40 PM Victorino White MD [facility]

## 2022-01-05 NOTE — TELEPHONE ENCOUNTER
Called primary provider - pt supposedly on ptu - currently 16 weeks, primary MD will put her on methimazole at 5 mg TID - will have pt be seen in Endocrinology sooner

## 2022-01-05 NOTE — TELEPHONE ENCOUNTER
To schedulers : please schedule with consult service (or open new/BALJINDER) within a week. This could be a virtual visit.      Treasure Kaufman MD  Endocrine triage

## 2022-01-06 ENCOUNTER — TELEPHONE (OUTPATIENT)
Dept: MATERNAL FETAL MEDICINE | Facility: CLINIC | Age: 27
End: 2022-01-06
Payer: COMMERCIAL

## 2022-01-06 NOTE — TELEPHONE ENCOUNTER
RECORDS RECEIVED FROM: internal    DATE RECEIVED: 1.12.22   NOTES (FOR ALL VISITS) STATUS DETAILS   OFFICE NOTES from referring provider internal  Marion Figueroa MD   [facility]     ED NOTES internal  12.21.21 [facility]    OPERATIVE REPORT  (thyroid, pituitary, adrenal, parathyroid) na    MEDICATION LIST internal     IMAGING      DEXASCAN na    MRI (BRAIN) na    XR (Chest) na    CT (HEAD/NECK/CHEST/ABDOMEN) na    NUCLEAR  internal  4.5.19, 4.4.19   ULTRASOUND (HEAD/NECK) na    LABS     DIABETES: HBGA1C, CREATININE, FASTING LIPIDS, MICROALBUMIN URINE, POTASSIUM, TSH, T4    THYROID: TSH, T4, CBC, THYRODLONULIN, TOTAL T3, FREE T4, CALCITONIN, CEA internal  Cbc- 12.21.21  HBGA1C- 11.16.21   Cortisol- 12.21.21  Cortisol- 11.16.21  T3- 2.8.19   TSH/T4- 12.21.21

## 2022-01-06 NOTE — TELEPHONE ENCOUNTER
Phone call to pt re appointments set for next week to see MFM and Endo. Pt mailbox full. At this time appointments held for 1/14 for ultrasound and for MFM consult. Ztory message sent to pt. Will continue to call and offer her appointments. Rosangela Cruz RN

## 2022-01-10 ENCOUNTER — TELEPHONE (OUTPATIENT)
Dept: MATERNAL FETAL MEDICINE | Facility: CLINIC | Age: 27
End: 2022-01-10
Payer: COMMERCIAL

## 2022-01-10 NOTE — TELEPHONE ENCOUNTER
Phone call with message left for pt re us and consult scheduled for Friday, 1/14 at 1100 for Us and 1145 for consult with Dr. Figueroa. Phone number left for pt to call to obtain further information and directions. Will await phone call from pt to call back. Rosangela Cruz RN

## 2022-01-12 ENCOUNTER — PRE VISIT (OUTPATIENT)
Dept: MATERNAL FETAL MEDICINE | Facility: CLINIC | Age: 27
End: 2022-01-12

## 2022-01-12 ENCOUNTER — VIRTUAL VISIT (OUTPATIENT)
Dept: ENDOCRINOLOGY | Facility: CLINIC | Age: 27
End: 2022-01-12
Payer: COMMERCIAL

## 2022-01-12 ENCOUNTER — TELEPHONE (OUTPATIENT)
Dept: ENDOCRINOLOGY | Facility: CLINIC | Age: 27
End: 2022-01-12

## 2022-01-12 ENCOUNTER — PRE VISIT (OUTPATIENT)
Dept: ENDOCRINOLOGY | Facility: CLINIC | Age: 27
End: 2022-01-12

## 2022-01-12 DIAGNOSIS — R00.0 TACHYCARDIA: ICD-10-CM

## 2022-01-12 DIAGNOSIS — E05.90 HYPERTHYROIDISM: ICD-10-CM

## 2022-01-12 DIAGNOSIS — Z34.92 ENCOUNTER FOR SUPERVISION OF NORMAL PREGNANCY IN SECOND TRIMESTER, UNSPECIFIED GRAVIDITY: ICD-10-CM

## 2022-01-12 DIAGNOSIS — E05.00 GRAVES DISEASE: ICD-10-CM

## 2022-01-12 PROCEDURE — 99214 OFFICE O/P EST MOD 30 MIN: CPT | Mod: 95 | Performed by: INTERNAL MEDICINE

## 2022-01-12 ASSESSMENT — ENCOUNTER SYMPTOMS
NAIL CHANGES: 0
FLANK PAIN: 0
SINUS PAIN: 0
PALPITATIONS: 1
RECTAL PAIN: 0
TASTE DISTURBANCE: 0
ABDOMINAL PAIN: 0
POLYDIPSIA: 1
POLYPHAGIA: 1
CHILLS: 0
FATIGUE: 0
SORE THROAT: 0
JAUNDICE: 0
EXERCISE INTOLERANCE: 0
HYPOTENSION: 0
HALLUCINATIONS: 0
POSTURAL DYSPNEA: 0
ORTHOPNEA: 0
WEIGHT GAIN: 0
DIARRHEA: 0
HEMOPTYSIS: 0
BLOATING: 0
DYSURIA: 0
SYNCOPE: 0
DIFFICULTY URINATING: 0
NECK MASS: 0
SNORES LOUDLY: 0
BOWEL INCONTINENCE: 0
HYPERTENSION: 1
POOR WOUND HEALING: 0
VOMITING: 1
HOARSE VOICE: 0
HEMATURIA: 0
SLEEP DISTURBANCES DUE TO BREATHING: 0
NAUSEA: 1
SKIN CHANGES: 0
COUGH DISTURBING SLEEP: 0
SPUTUM PRODUCTION: 0
LIGHT-HEADEDNESS: 1
SINUS CONGESTION: 0
DECREASED APPETITE: 0
TROUBLE SWALLOWING: 0
CONSTIPATION: 0
WHEEZING: 0
BLOOD IN STOOL: 0
DYSPNEA ON EXERTION: 0
HEARTBURN: 1
NIGHT SWEATS: 1
WEIGHT LOSS: 1
FEVER: 0
SMELL DISTURBANCE: 0
COUGH: 0
ALTERED TEMPERATURE REGULATION: 1
INCREASED ENERGY: 1
SHORTNESS OF BREATH: 1

## 2022-01-12 NOTE — PROGRESS NOTES
Endocrinology Fellow note    Chief complaint:  Yusuf is a 26 year old female seen in consultation at the request of Dr. Carolina MD.     HISTORY OF PRESENT ILLNESS  25 yo female,  currently pregnant (17w 1d based on LMP) who presents for initial evaluation of Graves Disease.     Was initially diagnosed with hyperthyroidism secondary to Graves Disease in 2016. She had been pregnant at the time. She was started on methimazole 15 mg daily. At the time of initial presentation she had been experiencing weight loss of 20 pounds, palpitations, heat intolerance and anxiety.     She followed with endocrinology and was treated through another pregnancy in 2017.  Radioactive iodine ablation was contemplated (initially deferred due to concern for worsening of thyroid eye disease) and ultimately pursued due to patient preference: Radioactive iodine uptake scan revealed an 80% uptake. She underwent radioactive iodine ablation on 2019 and received 14.6 mCi.     She was last seen by Endocrinology at [facility] in 2019 by Dr. Sharma at which time the patient was prescribed methimazole (the medication was resumed due to persistent hyperthyroidism after radioiodine therapy).  She has a history of thyroid related eye disease, which had not been active at the time.     During this pregnancy, she was initially started on PTU 50 mg daily in 2021 (prescribed 2021) by Family Medicine providers, then transitioned to methimazole 5 mg TID on 22 at 16 weeks gestation. While on the PTU she had remained hyperthyroid on biochemical workup.     She had previously been prescribed metoprolol but had not been taking this; has been taking this regularly since 21 (25 mg daily).     She had tested positive for COVID in 2021.     She has been intermittently lost to follow up for several appointments.     Palpitations, heat intolerance, shortness of breath, not gaining weight.   Difficulty seeing at night. No double  vision.     REVIEW OF SYSTEMS  .  10 system ROS otherwise as per the HPI or negative    Past Medical History  Past Medical History:   Diagnosis Date     Endocrine disease      Hypertension      Hyperthyroidism 2016     Precipitous delivery, delivered (current hospitalization) 2016      delivery 2016     Unspecified fetal growth retardation, unspecified (weight) 2013     Urinary tract infection        Medications  Current Outpatient Medications   Medication     methimazole (TAPAZOLE) 5 MG tablet     metoprolol succinate ER (TOPROL-XL) 25 MG 24 hr tablet     Prenatal Vit-Fe Fumarate-FA (PRENATAL MULTIVITAMIN W/IRON) 27-0.8 MG tablet     white petrolatum-mineral oil (REFRESH LACRI-LUBE) 56.8-42.5 % Oint     No current facility-administered medications for this visit.       Current Outpatient Medications   Medication Sig Dispense Refill     methimazole (TAPAZOLE) 5 MG tablet Take 1 tablet (5 mg) by mouth 3 times daily 90 tablet 0     metoprolol succinate ER (TOPROL-XL) 25 MG 24 hr tablet Take 1 tablet (25 mg) by mouth daily 60 tablet 0     Prenatal Vit-Fe Fumarate-FA (PRENATAL MULTIVITAMIN W/IRON) 27-0.8 MG tablet Take 1 tablet by mouth daily 90 tablet 3     white petrolatum-mineral oil (REFRESH LACRI-LUBE) 56.8-42.5 % Oint [WHITE PETROLATUM-MINERAL OIL (REFRESH LACRI-LUBE) 56.8-42.5 % OINT] Apply to eyelid/eye at night  0       Allergies  No Known Allergies      Family History  family history includes Mental retardation in her brother; No Known Problems in her father; Thyroid Disease in her mother.    Social History  Social History     Tobacco Use     Smoking status: Former Smoker     Types: Cigarettes     Smokeless tobacco: Never Used   Substance Use Topics     Alcohol use: No     Drug use: No       Physical Exam  GENERAL :  In no apparent distress  SKIN: Visible skin clear, no visible rash   EYES: Mild proptosis, no conjunctival redness, stare, retraction  NECK: No visible masses.   THYROID: **  goiter  RESP: No audible cough, normal work of breathing     NEURO: awake, alert, responds appropriately to questions. Tremor **    DATA REVIEW    [facility] 12/21/2021 11/16/2021   TSH 0.30 - 5.00 uIU/mL <0.01 (L) <0.01 (L)   T3 45 - 175 ng/dL     FREE T4 0.70 - 1.80 ng/dL 3.10 (H) 3.00 (H)     [facility] Latest Ref R[facility] & Units 9/16/2020 2/8/2019   TSH 0.30 - 5.00 uIU/mL <0.01 (L) <0.01 (L)   T3 45 - 175 ng/dL  364 (H)   FREE T4 0.70 - 1.80 ng/dL       [facility] Latest Ref Rng & Units 8/28/2018 8/16/2018   TSH 0.30 - 5.00 uIU/mL <0.01 (L) 0.05 (L)   T3 45 - 175 ng/dL  208 (H)   FREE T4 0.70 - 1.80 ng/dL       [facility] Latest Ref Rng & Units 7/25/2018 2/16/2018   TSH 0.30 - 5.00 uIU/mL 2.89 <0.01 (L)   T3 45 - 175 ng/dL  236 (H)   FREE T4 0.70 - 1.80 ng/dL       TSH Receptor Antibody >40 on 01/29/2016.  Earliest TFTs 01/2016 : TSH <0.01, FT4 2.5     NM thyroid uptake and scan (04/04/2019)  FINDINGS: 24-hour uptake is 80%, normal range 10-30%.. 3 hour uptake 84%, normal range for to 10%.  Scintigraphic evaluation shows no focal abnormality.     IMPRESSION:  CONCLUSION:  1.  Abnormal thyroid uptake.  2.  Normal scintigraphic evaluation.    ASSESSMENT/PLAN:     Pregnancy (17w1d)  Graves Disease s/p radioactive iodine ablation in 04/2019  History of thyroid eye disease; not currently active  She continues to be symptomatic with ongoing palpitations, heat intolerance and difficulty gaining weight. Her last TFTS were in 12/2021 and she has been on the methimazole 5 mg TID dose since 01/05/2021. She is also taking metoprolol 25 mg daily. We will re-check thyroid function tests as per below. We will aim for a total T4 approximately 1.5 times the upper limit of normal (to prevent hypothyroidism which could adversely affect the fetus). She is also exhibiting thyroid eye symptoms, therefore will place a referral to ophthalmology.      -TSH, total T3, total  T4  - TSI and TRAB  (will also plan to re-check these at ~ 30 weeks of gestation)  - Continue methimazole 5 mg TID   - Continue metoprolol 25 mg daily  - Eye referral for evaluation of thyroid eye disease  - Discussed how to monitor pulse and update clinic if noting tachycardia  - RTC in one month at [facility]     Raheel Cruz MD  Endocrinology Fellow, PGY IV    Due to the COVID 19 pandemic this visit was a video visit in order to help prevent spread of infection in this high risk patient and the general population. The patient gave verbal consent for the visit today.    Start time 10.35 am  Stop time  11.10 am  Total time 35 minutes       I have seen and examined the patient, reviewed and edited the fellow's note, and agree with the plan of care.    Matthew Echols MD   Division of Diabetes, Endocrinology and Metabolism  Department of Medicine

## 2022-01-12 NOTE — PATIENT INSTRUCTIONS
"It was a pleasure seeing you in virtual clinic today!    Please continue methimazole 5 mg three times daily.     Please continue metoprolol 25 mg daily.     We have ordered some thyroid function labs to be drawn (see lab scheduling instructions below).     Based on the results, we will be getting in touch with you via [employer] about adjusting the methimazole dose.     Please check pulse once daily; if pulse if over 90 on checks, please update us via [employer].    Follow-up with Dr. Echols in [place] in 1 month.      For scheduling appointments or labs, please request an appointment through [employer] or call [phone] select option #3 for triage nurse    For questions for your provider or the endocrine nurse, please send a [employer] message or call [phone] select option #3 for triage nurse    If this is an emergency overnight or on weekends, please call [phone]. This will get you the [facility] . Please ask for adult endocrinology \"on call\" and they will connect you to one of my colleagues who will always be available.        "

## 2022-01-12 NOTE — TELEPHONE ENCOUNTER
Attempted to reach patient to schedule follow up in the Endocrinology Clinic. No answer, LM on VM to call office back.    Schedule with Matthew Echols MD in [place] in 1 month (around 2/9/22). Mary Guardado on 1/12/2022 at 4:22 PM

## 2022-01-18 ENCOUNTER — TELEPHONE (OUTPATIENT)
Dept: ENDOCRINOLOGY | Facility: CLINIC | Age: 27
End: 2022-01-18
Payer: COMMERCIAL

## 2022-01-18 NOTE — TELEPHONE ENCOUNTER
1/18 - mychart msg sent for pt to schedule labs, appt with CSS, and an appt with provider in 1 month

## 2022-01-18 NOTE — TELEPHONE ENCOUNTER
Endocrine team, can we contact patient to coordinate the following: It is easiest to reach her via [employer].  -She sent a message reporting higher pulse; can we have her come in to clinic for nurse visit for blood pressure and pulse check; please forward me this information after her visit so that I can adjust her metoprolol dose based on this data  -She needs lab appointment set up to check thyroid function tests  -She needs a visit with me set up in about 1 month for follow-up (is hyperthyroid and pregnant)  Thank you,  Matthew Echols MD

## 2022-01-24 ENCOUNTER — TRANSCRIBE ORDERS (OUTPATIENT)
Dept: MATERNAL FETAL MEDICINE | Facility: HOSPITAL | Age: 27
End: 2022-01-24
Payer: COMMERCIAL

## 2022-01-24 DIAGNOSIS — E05.90 HYPERTHYROIDISM AFFECTING PREGNANCY IN SECOND TRIMESTER: Primary | ICD-10-CM

## 2022-01-24 DIAGNOSIS — O99.282 HYPERTHYROIDISM AFFECTING PREGNANCY IN SECOND TRIMESTER: Primary | ICD-10-CM

## 2022-01-24 DIAGNOSIS — O26.90 PREGNANCY RELATED CONDITION, ANTEPARTUM: Primary | ICD-10-CM

## 2022-01-25 ENCOUNTER — LAB (OUTPATIENT)
Dept: LAB | Facility: CLINIC | Age: 27
End: 2022-01-25
Payer: COMMERCIAL

## 2022-01-25 DIAGNOSIS — E05.90 HYPERTHYROIDISM: ICD-10-CM

## 2022-01-25 DIAGNOSIS — R00.0 TACHYCARDIA: ICD-10-CM

## 2022-01-25 DIAGNOSIS — E05.00 GRAVES DISEASE: ICD-10-CM

## 2022-01-25 DIAGNOSIS — Z34.92 ENCOUNTER FOR SUPERVISION OF NORMAL PREGNANCY IN SECOND TRIMESTER, UNSPECIFIED GRAVIDITY: ICD-10-CM

## 2022-01-25 LAB
T3 SERPL-MCNC: 373 NG/DL (ref 60–181)
T4 SERPL-MCNC: 19.7 UG/DL (ref 4.5–13)
TSH SERPL DL<=0.005 MIU/L-ACNC: <0.01 UIU/ML (ref 0.3–5)

## 2022-01-25 PROCEDURE — 84436 ASSAY OF TOTAL THYROXINE: CPT

## 2022-01-25 PROCEDURE — 84480 ASSAY TRIIODOTHYRONINE (T3): CPT

## 2022-01-25 PROCEDURE — 36415 COLL VENOUS BLD VENIPUNCTURE: CPT

## 2022-01-25 PROCEDURE — 84445 ASSAY OF TSI GLOBULIN: CPT | Mod: 90

## 2022-01-25 PROCEDURE — 84443 ASSAY THYROID STIM HORMONE: CPT

## 2022-01-25 PROCEDURE — 99000 SPECIMEN HANDLING OFFICE-LAB: CPT

## 2022-01-25 PROCEDURE — 83520 IMMUNOASSAY QUANT NOS NONAB: CPT | Mod: 90

## 2022-01-27 LAB — TSH RECEP AB SER-ACNC: 31 IU/L (ref 0–1.75)

## 2022-01-30 RX ORDER — METHIMAZOLE 5 MG/1
5 TABLET ORAL 2 TIMES DAILY
Qty: 90 TABLET | Refills: 0 | Status: SHIPPED | OUTPATIENT
Start: 2022-01-30 | End: 2022-02-23

## 2022-01-30 NOTE — PROGRESS NOTES
Maternal-Fetal Medicine Consultation    Yusuf Stubbs  : 1995  MRN: [id number]    REFERRAL:  Yusuf Stubbs is a 26 year old sent by Ms. Ramsey from [facility] for MFM consultation.    HPI:  Yusuf Stubbs is a 26 year old  at 20w0d by LMP consistent with 13w0d US here for MFM consultation regarding hyperthyroidism.    She is here alone today.     Patient is here to discuss hyperthyroidism in pregnancy. She was initially diagnosed with hyperthyroidism secondary to Graves Disease in 2016 during her second pregnancy. She followed with endocrinology and was treated through another pregnancy in 2017. After delivery, she ultimately decided to proceed with radioactive iodine ablation on 2019. She has had persistent hyperthyroidism despite the ROLNAD, and outside of pregnancy is on methimazole. She was previously following with endocrinology at [facility], last seen in .     During this pregnancy, she was initially started on PTU 50 mg daily beginning on 2021. At 16 weeks' gestation, she was transitioned to methimazole 5 mg TID on 22. She was also noted to be tachycardic, though had not been previously prescribed beta blocker. She started metoprolol 25mg daily and has noted some improvement in her symptoms. She has continued to note some palpitations, heat intolerance, and fatigue. She does note some improvement in her symptoms since switching to methimazole.    She also reports previous history of delivering small babies. With her first delivery at 40w0d the  weighed 2495g, her second delivery in 2016 at 35w0d, the weight was 1790g, and her third delivery at 36w2d, the weight was 2295g. She is sure of her last menstrual period and reports her menses are regular.        Prenatal Care:  Primary OB care this pregnancy has been with Dr. White from [facility].    Obstetrics History:  OB History    Para Term  AB Living   4  3 1 2 0 3   SAB IAB Ectopic Multiple Live Births   0 0 0 0 3      # Outcome Date GA Lbr Senthil/2nd Weight Sex Delivery Anes PTL Lv   4 Current            3  17 36w2d 08:38 / 00:02 2.295 kg (5 lb 1 oz) M Vag-Spont None N LUKE      Name: Ha      Apgar1: 8  Apgar5: 9   2  16 35w0d 00:30 / 00:32 1.79 kg (3 lb 15.1 oz) M Vag-Spont None Y LUKE      Name: Marito      Apgar1: 8  Apgar5: 9   1 Term 10/10/13 40w0d  2.495 kg (5 lb 8 oz) F Vag-Spont   LUKE      Birth Comments: 2nd deg labial lac repaired      Name: Nuvia      Apgar1: 9  Apgar5: 9       Past Medical History:  Past Medical History:   Diagnosis Date     Endocrine disease      Hypertension      Hyperthyroidism 2016     Precipitous delivery, delivered (current hospitalization) 2016      delivery 2016     Unspecified fetal growth retardation, unspecified (weight)      Urinary tract infection        Past Surgical History:  Past Surgical History:   Procedure Laterality Date     NO PAST SURGERIES       Current Medications:  Prior to Admission medications    Medication Sig Last Dose Taking? Auth Provider   methimazole (TAPAZOLE) 5 MG tablet Take 1 tablet (5 mg) by mouth 3 times daily   Victorino White MD   metoprolol succinate ER (TOPROL-XL) 25 MG 24 hr tablet Take 1 tablet (25 mg) by mouth daily   Victorino White MD   Prenatal Vit-Fe Fumarate-FA (PRENATAL MULTIVITAMIN W/IRON) 27-0.8 MG tablet Take 1 tablet by mouth daily   Maine Ramsey PA-C   white petrolatum-mineral oil (REFRESH LACRI-LUBE) 56.8-42.5 % Oint [WHITE PETROLATUM-MINERAL OIL (REFRESH LACRI-LUBE) 56.8-42.5 % OINT] Apply to eyelid/eye at night   Arias Joseph MD       Allergies:  Patient has no known allergies.    Social History:   Occupation: Denies current tobacco, alcohol, illicit drug use.   Status: Lives with in-laws and children; feels safe at home.   Denies use of alcohol, drugs or smoking.    ROS:  10-point ROS negative except as in HPI   BP (!)  148/83   LMP 2021 (Exact Date)     Labs:  2022  TSH <0.01  T3 373  Total T4 19.7  Thyrotropin receptor antibody 31      Other Imaging:   Refer to ultrasound report under imaging tab for today's [facility] US    ASSESSMENT/PLAN:  Yusuf Stubbs is a 26 year old  at 20w0d by LMP consistent with 13w0d US here for [facility] consultation regarding:     Hyperthyroidism  The prevalence of hyperthyroidism is approximately 0.2% and most cases result from Graves  disease (85-95%). Diagnosis and management of hyperthyroidism can be complicated in the first trimester, when TSH can be suppressed due to the high concentration of hCG, which can stimulate  the thyroid gland.  Because of changes in thyroid hormone binding globulin, which increases in pregnancy, total thyroxin may increase while the free T4 may be normal.  As a result, it is critical to rely on the symptoms of the patient as well as the serum free T4 concentration to diagnose and follow the hyperthyroid patient. We discourage the use of the TSH test in patients with hyperthyroidism in pregnancy because it is less helpful than the free T4 test.    The natural course of Graves hyperthyroidism is often characterized by exacerbation of symptoms in the 1st trimester and postpartum, with an amelioration of symptoms in the second half of gestation.  Graves disease is the result of excessive thyroid stimulating antibodies in the maternal circulation.  These antibodies may circulate for many years after hyperthyroidism is corrected in the mother, including after thyroidectomy or radio-active iodine treatment, and may cross the placenta resulting in fetal and  Graves disease in 1-5% of pregnancies. The fetus remains at risk even if the mother is euthyroid.  The risk is lower after thyroidectomy than after radio-iodine treatment.  Signs of potential fetal hyperthyroidism include fetal tachycardia, intrauterine growth restriction, presence of fetal goiter (the earliest  sonographic sign of fetal thyroid dysfunction), and fetal hydrops. Fetal thyroid status should be monitored by fetal heart rate checks, nonstress tests, and periodic anatomical ultrasounds to assess for fetal growth and goiter.    Uncontrolled hyperthyroidism has been associated with miscarriage, premature labor, low birth weight, intrauterine fetal demise, preeclampsia, and some cardiac disorders.  Poorly controlled hyperthyroidism can also lead to thyroid storm, particularly in times of stress during the pregnancy, and this poses potential serious harm to the mother and fetus.  Well controlled hyperthyroidism typically has good outcomes.    Treatment is reserved for moderate to severe symptomatic diseae.  Treatment usually consists of thyroid modifying agents called thionamides:  methimazole (MMI) or propylthiouracil (PTU).  Both drugs cross the placenta and both have the risk of causing fetal hypothyroidism and consequent goiter and thus should be used judiciously.  Methimazole has been associated with a risk of aplasia cutis, choanal atresia, and esophageal atresia, though it is thought that the absolute risk of this is very low.  PTU has a risk for agranulocytosis and liver toxicity.  For this reason, PTU is recommended in the first trimester to lower teratogenic risks.  Methimazole, because of a lower risk of liver toxicity, is the favored primary treatment for the remainder of pregnancy.  The goals of treatment are to keep the patient euthyroid on the minimum amount of antithyroid medication, titrating to achieve a free T4 in the high normal range.     Chronic hypertension  The concern with chronic hypertension is that such patients are more likely to develop preeclampsia during the pregnancy, with a risk of 20-50%.  Women with chronic hypertension are at increased risk for early-onset preeclampsia.  Low dose aspirin has been used to lower this risk.  Chronic hypertension also increases the risk of maternal  stroke, pulmonary edema, renal failure, gestational diabetes, iatrogenic  birth, fetal growth restriction, placental abruption and  mortality rate including stillbirth    Without baseline laboratory assessment, it may be difficult to distinguish an exacerbation of hypertension from preeclampsia, especially in the third trimester. We reviewed that it is generally anticipated that blood pressure will gradually decrease during early pregnancy, reaching at kanika at 28-32 weeks, and then slowly rise to pre-pregnancy levels.     Guidelines suggest starting antihypertensive medication in women with chronic hypertension if the systolic blood pressure is persistently 160 mmHg or higher or the diastolic blood pressure is persistently 110 mmHg or higher.  If there is evidence of end organ damage (renal insufficiency, left ventricular hypertrophy or severe thrombocytopenia) a lower threshold of 150 mmHg systolic and/or 100 mm Hg diastolic is recommended. Treatment with antihypertensives is generally used to maintain blood pressure in the general range of 120-159 mmHg systolic and  mmHg diastolic. Lower blood pressures may increase the risk of fetal growth restriction. The primary reason for antihypertensive is to reduce the risk of maternal stroke. Recommended antihypertensives with studied safety profiles in pregnancy include nifedipine and labetalol. Unfortunately, even exquisite control of blood pressure does not reduce the risk of superimposed preeclampsia.     Recommendations:    Continue Methimazole for the remainder of the pregnancy and under supervision of endocrinology.    Free-T4 should be monitored every 4-6 weeks and treatment adjusted as necessary to maintain free-T4 at upper end of normal range. Last total T4 was elevated at 19.7. Consideration for continued up-titration of methimazole per endocrinology. If doses are changed, free-T4 should be tested 2-3 weeks later.  TSH should not be  followed for hyperthyroid patients.    Recommend beginning low dose aspirin 81 mg daily for preeclampsia prophylaxis.     Close monitoring for development of preeclampsia.    Close monitoring for symptoms of hyperthyroidism.    Continued monitoring of fetal growth given diagnosis of fetal growth restriction.     Prenatal visits to assess fetal heart rate should be performed at least every 2 weeks.      fetal testing with weekly BPP (or NST and MVP) starting at 32 weeks.      Close follow up with endocrinology during pregnancy and immediately postpartum.    Given history of  birth, option for 17-hydroxyprogesterone caproate was previously discussed by her primary OBGYN, though patient not able to come to weekly visits for injections.     Administration of  corticosteroids if the patient is deemed to be at increased risk of imminent  delivery.    Clinical evaluation of  labor symptoms.      Close monitoring postpartum for thyroiditis    If patient does not have spontaneous  birth, delivery timing will be dictated by presence of FGR, umbilical artery dopplers, as well as blood pressure control and presence of any other obstetric comorbidities. Though delivery no later than 38 weeks' gestation is recommended.    Recommend COVID vaccination to patient. Discussed evidence of safety of vaccination in pregnancy and also discussed risks of COVID in pregnancy, including ICU admission, mechanical ventilation, and death as well as increased risk of stillbirth. Patient is strongly considering getting vaccinated.     The patient was seen and evaluated with Dr. Clement.    Thank you for allowing us to participate in the care of your patient. Please do not hesitate to contact us if you have further questions regarding the management of your patient.     Laura De Leon MD  Maternal Fetal Medicine Fellow  2022 3:29 PM      Total time spent in face-to-face counseling was 30 minutes (>50%  "for medical management discussion and plan of care).  A copy of this consult was sent to your office.     Please see \"Imaging\" tab under \"Chart Review\" for details of today's US at the [facility].    Ramon Clement MD  Maternal-Fetal Medicine      "

## 2022-01-31 LAB — TSI SER-ACNC: 4.8 TSI INDEX

## 2022-02-01 ENCOUNTER — OFFICE VISIT (OUTPATIENT)
Dept: MATERNAL FETAL MEDICINE | Facility: CLINIC | Age: 27
End: 2022-02-01
Attending: PHYSICIAN ASSISTANT
Payer: COMMERCIAL

## 2022-02-01 ENCOUNTER — HOSPITAL ENCOUNTER (OUTPATIENT)
Dept: ULTRASOUND IMAGING | Facility: CLINIC | Age: 27
End: 2022-02-01
Attending: OBSTETRICS & GYNECOLOGY
Payer: COMMERCIAL

## 2022-02-01 ENCOUNTER — PRENATAL OFFICE VISIT (OUTPATIENT)
Dept: FAMILY MEDICINE | Facility: CLINIC | Age: 27
End: 2022-02-01
Payer: COMMERCIAL

## 2022-02-01 VITALS — SYSTOLIC BLOOD PRESSURE: 148 MMHG | DIASTOLIC BLOOD PRESSURE: 83 MMHG

## 2022-02-01 VITALS
RESPIRATION RATE: 18 BRPM | HEART RATE: 77 BPM | WEIGHT: 106.5 LBS | DIASTOLIC BLOOD PRESSURE: 64 MMHG | SYSTOLIC BLOOD PRESSURE: 133 MMHG | BODY MASS INDEX: 23.05 KG/M2

## 2022-02-01 DIAGNOSIS — Z34.92 ENCOUNTER FOR SUPERVISION OF NORMAL PREGNANCY IN SECOND TRIMESTER, UNSPECIFIED GRAVIDITY: Primary | ICD-10-CM

## 2022-02-01 DIAGNOSIS — O26.90 PREGNANCY RELATED CONDITION, ANTEPARTUM: ICD-10-CM

## 2022-02-01 DIAGNOSIS — E05.90 HYPERTHYROIDISM: ICD-10-CM

## 2022-02-01 DIAGNOSIS — E05.90 HYPERTHYROIDISM AFFECTING PREGNANCY IN SECOND TRIMESTER: ICD-10-CM

## 2022-02-01 DIAGNOSIS — O16.2 HYPERTENSION AFFECTING PREGNANCY IN SECOND TRIMESTER: ICD-10-CM

## 2022-02-01 DIAGNOSIS — Z23 HIGH PRIORITY FOR 2019-NCOV VACCINE: ICD-10-CM

## 2022-02-01 DIAGNOSIS — O99.282 HYPERTHYROIDISM AFFECTING PREGNANCY IN SECOND TRIMESTER: ICD-10-CM

## 2022-02-01 DIAGNOSIS — E05.90 HYPERTHYROIDISM AFFECTING PREGNANCY IN SECOND TRIMESTER: Primary | ICD-10-CM

## 2022-02-01 DIAGNOSIS — Z31.5 ENCOUNTER FOR PROCREATIVE GENETIC COUNSELING: Primary | ICD-10-CM

## 2022-02-01 DIAGNOSIS — O99.282 HYPERTHYROIDISM AFFECTING PREGNANCY IN SECOND TRIMESTER: Primary | ICD-10-CM

## 2022-02-01 LAB
ALBUMIN UR-MCNC: NEGATIVE MG/DL
GLUCOSE UR STRIP-MCNC: NEGATIVE MG/DL
KETONES UR STRIP-MCNC: NEGATIVE MG/DL

## 2022-02-01 PROCEDURE — 76820 UMBILICAL ARTERY ECHO: CPT | Mod: 26 | Performed by: OBSTETRICS & GYNECOLOGY

## 2022-02-01 PROCEDURE — 91305 COVID-19,PF,PFIZER (12+ YRS): CPT | Performed by: FAMILY MEDICINE

## 2022-02-01 PROCEDURE — 96040 HC GENETIC COUNSELING, EACH 30 MINUTES: CPT | Performed by: GENETIC COUNSELOR, MS

## 2022-02-01 PROCEDURE — 76811 OB US DETAILED SNGL FETUS: CPT

## 2022-02-01 PROCEDURE — 99207 PR PRENATAL VISIT: CPT | Performed by: FAMILY MEDICINE

## 2022-02-01 PROCEDURE — G0463 HOSPITAL OUTPT CLINIC VISIT: HCPCS | Mod: 25 | Performed by: GENETIC COUNSELOR, MS

## 2022-02-01 PROCEDURE — 0051A COVID-19,PF,PFIZER (12+ YRS): CPT | Performed by: FAMILY MEDICINE

## 2022-02-01 PROCEDURE — 81003 URINALYSIS AUTO W/O SCOPE: CPT | Performed by: FAMILY MEDICINE

## 2022-02-01 PROCEDURE — 99242 OFF/OP CONSLTJ NEW/EST SF 20: CPT | Mod: 25 | Performed by: OBSTETRICS & GYNECOLOGY

## 2022-02-01 PROCEDURE — 76811 OB US DETAILED SNGL FETUS: CPT | Mod: 26 | Performed by: OBSTETRICS & GYNECOLOGY

## 2022-02-01 NOTE — NURSING NOTE
Yusuf here for MFM consult at 20w0d gestation for pregnancy c/b Graves disease and CHTN. FM+, denies vaginal bleeding, denies ctx. Plan for f/u comp in 3 weeks. Dr. Clement and Dr. De Leon in to see patient.

## 2022-02-01 NOTE — PROGRESS NOTES
[facility]  Genetic Counseling Consult    Patient:  Yusuf Stubbs YOB: 1995   Date of Service:  22      Yusuf Stubbs was seen at the [facility] for genetic consultation as part of her appointment for comprehensive ultrasound.  The indication for genetic counseling is routine screening for aneuploidy.       Impression/Plan:   1. Yusuf has not had serum screening in this pregnancy.     2. Yusuf had a comprehensive (level II) ultrasound today.  Please see the ultrasound report for further details.    3. The patient declines genetic amniocentesis and maternal serum screening today.    Pregnancy History:   /Parity:    Age at Delivery: 26 year old  ANNEMARIE: 2022, by Last Menstrual Period  Gestational Age: 20w0d    No significant complications or exposures were reported in the current pregnancy.    Yusuf strickland pregnancy history is significant for 2 prior  deliveries at 35 and 36 weeks gestation and one full term delivery with no reported obstetric complications.  This obstetric history was addressed in Yusuf's [facility] consult today, please see corresponding documentation for details.     Medical History:   Yusuf strickland reported medical history includes a diagnosis of Grave's disease, this history and implications for prenatal management was addressed in [facility] consult today, please see corresponding documentation for complete details.        Family History:   A three-generation pedigree was obtained, and is scanned under the  Media  tab.   The reported family history is negative for multiple miscarriages, stillbirths, birth defects, cognitive impairment, known genetic conditions, and consanguinity.       Carrier Screening:       Expanded carrier screening for mutations in a large panel of genes associated with autosomal recessive conditions including cystic fibrosis, spinal muscular atrophy, and others, is now  available.      The patient has declined a discussion of options for carrier screening today.       Risk Assessment for Chromosome Conditions:   We explained that the risk for fetal chromosome abnormalities increases with maternal age. We discussed specific features of common chromosome abnormalities, including Down syndrome, trisomy 13, trisomy 18, and sex chromosome trisomies.      - At age 26 at midtrimester, the risk to have a baby with Down syndrome is 1 in 928.     - At age 26 at midtrimester, the risk to have a baby with any chromosome abnormality is 1 in 464.       [facility] did not have maternal serum screening earlier in pregnancy.         Testing Options:   We discussed the following options:   Non-invasive Prenatal Testing (NIPT)    Maternal plasma cell-free DNA testing; first trimester ultrasound with nuchal translucency and nasal bone assessment is recommended, when appropriate    Screens for fetal trisomy 21, trisomy 13, trisomy 18, and sex chromosome aneuploidy    Cannot screen for open neural tube defects; maternal serum AFP after 15 weeks is recommended       Genetic Amniocentesis    Invasive procedure typically performed in the second trimester by which amniotic fluid is obtained for the purpose of chromosome analysis and/or other prenatal genetic analysis    Diagnostic results; >99% sensitivity for fetal chromosome abnormalities    AFAFP measurement tests for open neural tube defects       Comprehensive (Level II) ultrasound: Detailed ultrasound performed between 18-22 weeks gestation to screen for major birth defects and markers for aneuploidy.        We reviewed the benefits and limitations of this testing.  Screening tests provide a risk assessment specific to the pregnancy for certain fetal chromosome abnormalities, but cannot definitively diagnose or exclude a fetal chromosome abnormality.  Follow-up genetic counseling and consideration of diagnostic testing is recommended with any abnormal  screening result.     Diagnostic tests carry inherent risks- including risk of miscarriage- that require careful consideration.  These tests can detect fetal chromosome abnormalities with greater than 99% certainty.  Results can be compromised by maternal cell contamination or mosaicism, and are limited by the resolution of cytogenetic G-banding technology.  There is no screening nor diagnostic test that can detect all forms of birth defects or mental disability.    It was a pleasure to be involved with [facility] care. Face-to-face time of the meeting was 25 minutes.    Sammy Rankin MS, [facility]  Licensed Genetic Counselor  Phone: [phone]  Pager: [phone]

## 2022-02-03 PROBLEM — Z3A.09 9 WEEKS GESTATION OF PREGNANCY: Status: RESOLVED | Noted: 2021-11-16 | Resolved: 2022-02-03

## 2022-02-03 NOTE — PROGRESS NOTES
Subjective:  26 year old  at 20w0d  No ctx, lof, bleeding, or dc.  No HA or vision changes.  Saw MFM today.    Graves s/p radioactive ablation, but still hyperthyroid.  On methimazole.  Working with endocrinology.  Started metoprolol.  Better exercise tolerance, with that.  Less feelings of palpitations.    Chronic HTN  May be secondary to hyperthyroidism.  On metoprolol.  Will start ASA for prevention of preeclampsia.      Outpatient Medications Prior to Visit   Medication Sig Dispense Refill     methimazole (TAPAZOLE) 5 MG tablet Take 1 tablet (5 mg) by mouth 2 times daily 90 tablet 0     metoprolol succinate ER (TOPROL-XL) 25 MG 24 hr tablet Take 1 tablet (25 mg) by mouth daily 60 tablet 0     Prenatal Vit-Fe Fumarate-FA (PRENATAL MULTIVITAMIN W/IRON) 27-0.8 MG tablet Take 1 tablet by mouth daily 90 tablet 3     white petrolatum-mineral oil (REFRESH LACRI-LUBE) 56.8-42.5 % Oint [WHITE PETROLATUM-MINERAL OIL (REFRESH LACRI-LUBE) 56.8-42.5 % OINT] Apply to eyelid/eye at night  0     No facility-administered medications prior to visit.      History   Smoking Status     Former Smoker     Types: Cigarettes   Smokeless Tobacco     Never Used      Objective:  /64 (BP Location: Right arm, Patient Position: Sitting, Cuff Size: Adult Regular)   Pulse 77   Resp 18   Wt 48.3 kg (106 lb 8 oz)   LMP 2021 (Exact Date)   BMI 23.05 kg/m    GENERAL: alert, not distressed  CHEST: clear, no rales, rhonchi, or wheezes  CARDIAC: regular without murmur, gallop, or rub  ABDOMEN: gravid, soft, non tender, non distended    No results found for this or any previous visit (from the past 24 hour(s)).   Assessment and Plan:   Encounter for supervision of normal pregnancy in second trimester, unspecified     - Urinalysis, OB Screen; Future  - aspirin (ASA) 81 MG EC tablet; Take 1 tablet (81 mg) by mouth daily  Dispense: 100 tablet; Refill: 2  - Urinalysis, OB Screen    Graves  methimazole per Endo  Free-T4  should be monitored every 4-6 weeks  maintain free-T4 at upper end of normal     Chronic HTN  ASA  Metoprolol   fetal testing with weekly BPP (or NST and MVP) starting at 32 weeks.      High priority for 2019-nCoV vaccine  Pfizer vaccine provided, today.     Return in 1 month (on 3/1/2022).   Future Appointments   Date Time Provider Department Center   2022  9:30 AM SPRS MA/LPN ICFMOB [facility] SPRS   2022 11:00 AM OSMIN ABBOTT  2 JNMFMU [facility] N   [facility]   2022  3:30 PM MPLW ENDOCRINOLOGY CSS CARINE [facility] MPLW   2022  4:00 PM Matthew Echols MD MDENDO [facility] MPLW   2022  1:30 PM [facility]

## 2022-02-04 DIAGNOSIS — E05.00 GRAVES DISEASE: ICD-10-CM

## 2022-02-04 RX ORDER — METOPROLOL SUCCINATE 50 MG/1
50 TABLET, EXTENDED RELEASE ORAL DAILY
Qty: 30 TABLET | Refills: 3 | Status: SHIPPED | OUTPATIENT
Start: 2022-02-04 | End: 2022-02-23

## 2022-02-19 ENCOUNTER — HEALTH MAINTENANCE LETTER (OUTPATIENT)
Age: 27
End: 2022-02-19

## 2022-02-21 ENCOUNTER — LAB (OUTPATIENT)
Dept: LAB | Facility: CLINIC | Age: 27
End: 2022-02-21
Payer: COMMERCIAL

## 2022-02-21 DIAGNOSIS — E05.00 GRAVES DISEASE: ICD-10-CM

## 2022-02-21 DIAGNOSIS — E05.90 HYPERTHYROIDISM: ICD-10-CM

## 2022-02-21 LAB
ALBUMIN SERPL-MCNC: 3.2 G/DL (ref 3.5–5)
ALP SERPL-CCNC: 157 U/L (ref 45–120)
ALT SERPL W P-5'-P-CCNC: <9 U/L (ref 0–45)
AST SERPL W P-5'-P-CCNC: 12 U/L (ref 0–40)
BILIRUB DIRECT SERPL-MCNC: 0.2 MG/DL
BILIRUB SERPL-MCNC: 0.5 MG/DL (ref 0–1)
PROT SERPL-MCNC: 6.6 G/DL (ref 6–8)
T3 SERPL-MCNC: 489 NG/DL (ref 60–181)
T4 SERPL-MCNC: 28.5 UG/DL (ref 4.5–13)
TSH SERPL DL<=0.005 MIU/L-ACNC: <0.01 UIU/ML (ref 0.3–5)

## 2022-02-21 PROCEDURE — 84480 ASSAY TRIIODOTHYRONINE (T3): CPT

## 2022-02-21 PROCEDURE — 84443 ASSAY THYROID STIM HORMONE: CPT

## 2022-02-21 PROCEDURE — 80076 HEPATIC FUNCTION PANEL: CPT

## 2022-02-21 PROCEDURE — 36415 COLL VENOUS BLD VENIPUNCTURE: CPT

## 2022-02-21 PROCEDURE — 84436 ASSAY OF TOTAL THYROXINE: CPT

## 2022-02-23 ENCOUNTER — OFFICE VISIT (OUTPATIENT)
Dept: ENDOCRINOLOGY | Facility: CLINIC | Age: 27
End: 2022-02-23
Payer: COMMERCIAL

## 2022-02-23 ENCOUNTER — OFFICE VISIT (OUTPATIENT)
Dept: MATERNAL FETAL MEDICINE | Facility: HOSPITAL | Age: 27
End: 2022-02-23
Attending: OBSTETRICS & GYNECOLOGY
Payer: COMMERCIAL

## 2022-02-23 ENCOUNTER — ANCILLARY PROCEDURE (OUTPATIENT)
Dept: ULTRASOUND IMAGING | Facility: HOSPITAL | Age: 27
End: 2022-02-23
Attending: OBSTETRICS & GYNECOLOGY
Payer: COMMERCIAL

## 2022-02-23 VITALS
BODY MASS INDEX: 23.57 KG/M2 | HEART RATE: 116 BPM | DIASTOLIC BLOOD PRESSURE: 70 MMHG | WEIGHT: 108.9 LBS | SYSTOLIC BLOOD PRESSURE: 138 MMHG

## 2022-02-23 DIAGNOSIS — E05.00 GRAVES DISEASE: ICD-10-CM

## 2022-02-23 DIAGNOSIS — E05.90 HYPERTHYROIDISM AFFECTING PREGNANCY IN SECOND TRIMESTER: ICD-10-CM

## 2022-02-23 DIAGNOSIS — O99.282 HYPERTHYROIDISM AFFECTING PREGNANCY IN SECOND TRIMESTER: Primary | ICD-10-CM

## 2022-02-23 DIAGNOSIS — O99.282 HYPERTHYROIDISM AFFECTING PREGNANCY IN SECOND TRIMESTER: ICD-10-CM

## 2022-02-23 DIAGNOSIS — E05.90 HYPERTHYROIDISM AFFECTING PREGNANCY IN SECOND TRIMESTER: Primary | ICD-10-CM

## 2022-02-23 DIAGNOSIS — E05.90 HYPERTHYROIDISM: Primary | ICD-10-CM

## 2022-02-23 PROCEDURE — 76816 OB US FOLLOW-UP PER FETUS: CPT | Mod: 26 | Performed by: OBSTETRICS & GYNECOLOGY

## 2022-02-23 PROCEDURE — 99215 OFFICE O/P EST HI 40 MIN: CPT | Performed by: INTERNAL MEDICINE

## 2022-02-23 PROCEDURE — 99207 PR NO CHARGE LOS: CPT | Performed by: OBSTETRICS & GYNECOLOGY

## 2022-02-23 PROCEDURE — 76816 OB US FOLLOW-UP PER FETUS: CPT

## 2022-02-23 RX ORDER — ACETAMINOPHEN 500 MG
500-1000 TABLET ORAL EVERY 6 HOURS PRN
Status: ON HOLD | COMMUNITY
End: 2022-05-26

## 2022-02-23 RX ORDER — METHIMAZOLE 5 MG/1
TABLET ORAL
Qty: 100 TABLET | Refills: 3 | Status: SHIPPED | OUTPATIENT
Start: 2022-02-23 | End: 2022-04-01

## 2022-02-23 RX ORDER — PROPRANOLOL HYDROCHLORIDE 120 MG/1
120 CAPSULE, EXTENDED RELEASE ORAL DAILY
Qty: 30 CAPSULE | Refills: 5 | Status: SHIPPED | OUTPATIENT
Start: 2022-02-23 | End: 2022-05-06

## 2022-02-23 NOTE — PROGRESS NOTES
Please see the imaging tab for details of the ultrasound performed today.    Jaqui Johns MD  Specialist in Maternal-Fetal Medicine

## 2022-02-23 NOTE — PATIENT INSTRUCTIONS
-Change methimazole as follows: take 10 mg (TWO 5 mg tabs) twice daily for a week; after a week, change to 5 mg (ONE tab) three times a day and continue at this dose  -Start Propranolol 120 mg once daily  -Stop metoprolol when start propranolol  -Please update me if rash persists or is worsening over the coming 1-2 weeks  -Check pulse daily at home after 5 minutes of rest: update me if pulse if greater than 90 or less than 60  -Nurse visit in 1 week for blood pressure and pulse check  -Labs in 2 weeks  -Return for a follow-up visit in 2 months  -We will communicate results via [place], or if needed by phone

## 2022-02-23 NOTE — PROGRESS NOTES
ENDOCRINOLOGY FOLLOW-UP         HISTORY OF PRESENT ILLNESS    Yusuf Stubbs is seen in follow-up for the issues outlined below.     The patient is a 23 weeks and 1 day of gestation.    After initial virtual visit with me on 1/12/2022, we continued methimazole and metoprolol, with plan to check follow-up thyroid function test.  Due to a rapid decline in T4 and T3 levels, I recommended reducing methimazole to 5 mg twice daily (instead of 5 mg twice daily) and the patient confirms she made this change around 2/4/2022.      We increased metoprolol to 50 mg daily on 2/4/2022 due to persistent tachycardia.    She feels quite tired, often has to rest over the course of the day and sleep for longer periods of time overnight.  She notes that she has had significant palpitations and heat intolerance, although not as much tremulousness.  Notes a fullness in her neck and some discomfort with swallowing foods.  Eye symptoms are stable: Some nighttime difficulty with vision (specifically glare with lights) but no diplopia.    She has noted a rash on the medial aspect of her elbow on the left arm as well as behind her knees: She notes that she has had a similar rash in the past but wonders if it may be related to metoprolol since it appeared when dose was adjusted upward.    Was seen in [facility] and by her family OB physician on 2/1/2021.    She had [facility] ultrasound earlier today: 1) Hughes intrauterine pregnancy at 22w 1d gestational age by 13w 0d week ultrasound. 2) None of the anomalies commonly detected by ultrasound were evident in the detailed fetal anatomic survey described above. 3) Growth parameters and estimated fetal weight were consistent with an appropriate for gestation age pattern of growth. 4) The amniotic fluid volume appeared normal. 5) The cervix appeared long and closed by transabdominal imaging.     Pertinent endocrine and related history:  1.  Hypothyroidism, due to Graves' disease.  Was initially diagnosed with  hyperthyroidism secondary to Graves Disease in 2016. She had been pregnant at the time. She was started on methimazole 15 mg daily. At the time of initial presentation she had been experiencing weight loss of 20 pounds, palpitations, heat intolerance and anxiety.   -She followed with endocrinology and was treated through another pregnancy in 2017.  She recalls that both of the children she had with hyperthyroidism during pregnancy ultimately needed treatment with methimazole in infancy.    -Radioactive iodine ablation was contemplated (initially deferred due to concern for worsening of thyroid eye disease) and ultimately pursued due to patient preference: Radioactive iodine uptake scan revealed an 80% uptake. She underwent radioactive iodine ablation on 2019 and received 14.6 mCi.   -She was last seen by Endocrinology at [facility] in  by Dr. Sharma at which time the patient was prescribed methimazole (the medication was resumed due to persistent hyperthyroidism after radioiodine therapy).  2.  History of thyroid related eye disease.  3.  Pregnancy.  During this pregnancy, she was initially started on PTU 50 mg daily in 2021 (prescribed 2021) then transitioned to methimazole 5 mg TID on 22 at 16 weeks gestation.     PAST MEDICAL HISTORY  Past Medical History:   Diagnosis Date     Endocrine disease      Hypertension      Hyperthyroidism 2016     Precipitous delivery, delivered (current hospitalization) 2016      delivery 2016     Unspecified fetal growth retardation, unspecified (weight)      Urinary tract infection        MEDICATIONS  Current Outpatient Medications   Medication Sig Dispense Refill     acetaminophen (TYLENOL) 500 MG tablet Take 500-1,000 mg by mouth every 6 hours as needed for mild pain       aspirin (ASA) 81 MG EC tablet Take 1 tablet (81 mg) by mouth daily 100 tablet 2     methimazole (TAPAZOLE) 5 MG tablet Take 1 tablet (5 mg) by mouth 2 times daily  (Patient taking differently: Take 10 mg by mouth daily ) 90 tablet 0     metoprolol succinate ER (TOPROL-XL) 50 MG 24 hr tablet Take 1 tablet (50 mg) by mouth daily 30 tablet 3     Prenatal Vit-Fe Fumarate-FA (PRENATAL MULTIVITAMIN W/IRON) 27-0.8 MG tablet Take 1 tablet by mouth daily 90 tablet 3       Allergies, family, and social history were reviewed and documented as needed in EHR.     REVIEW OF SYSTEMS  A focused ROS was performed, with pertinent positives and negatives as noted in the HPI.    PHYSICAL EXAM  /70   Pulse 116   Wt 49.4 kg (108 lb 14.4 oz)   LMP 09/14/2021 (Exact Date)   BMI 23.57 kg/m    Body mass index is 23.57 kg/m .  Constitutional: Vital signs reviewed, as recorded above. Patient is alert, oriented and appears in no acute distress.  Eyes: Bilateral stare without lid lag or retraction, no conjunctival injection.  No diplopia on lateral gaze.  ENMT: OP clear with moist MM. Lips are without lesions.   Neck: Neck supple, thyroid is at least 2 times the size of normal, without palpable nodules.  Some tenderness on exam.  Lymphatic: No cervical or supraclavicular LAD.  Cardiovascular: Tachycardic, regular rhythm, normal S1/S2, no lower extremity edema.  Respiratory: CTAB, without wheezes, crackles or rhonchi; normal chest wall motion and respiratory effort.  GI: Gravid.  MSK: No clubbing or cyanosis; normal muscle bulk and tone.  Skin: Normal skin color, temperature, turgor and texture, no purple striae.  Neurological: Alert and oriented times 3. No tremor, reflexes 2+ and symmetric in biceps, with brisk relaxation phase.     DATA REVIEW  Each of the following laboratory and/or imaging studies were reviewed.     1/25/2022 13:52 2/21/2022 14:02   Albumin  3.2 (L)   Protein Total  6.6   Bilirubin Total  0.5   Alkaline Phosphatase  157 (H)   ALT  <9   AST  12   Bilirubin Direct  0.2   Thyroid Stim Immunog 4.8 (H)    Triiodothyronine (T3) 373 (H) 489 (H)   TSH <0.01 (L) <0.01 (L)    Thyrotropin Receptor Antibody 31 (H)    T4 Total 19.7 (H) 28.5 (H)     ASSESSMENT  1.  Hyperthyroidism.  Due to Graves' disease.  Thyrotoxic, with more marked elevation in total T4 after we reduced methimazole dose: May in part be due to insufficient antithyroid drug therapy (although she had rapid decline in T4 and T3 levels with methimazole) or a change in underlying autoimmune disease.  We will titrate methimazole upward and recheck thyroid function tests in 2 weeks, once again aiming for total T4 and T3 levels 1.5 times upper limit of normal.  Given concerns about possible rash with metoprolol, we will change beta-blocker to propranolol and recheck pulse and blood pressure in clinic in 1 week (patient will monitor pulse at home in the meantime).  She had markedly positive TSH receptor antibody and TSI: We discussed potential for development of fetal thyrotoxicosis (she has had 2 pregnancies where her children required methimazole therapy after delivery).  We will recheck serology for Graves' disease early in the third trimester to monitor evolution.    2.  Graves' disease.  Some nighttime visual symptoms with glare.  We placed referral at initial visit and patient has an appointment in June.  Monitor TSI and TSH receptor antibody as above.    3.  Rash.  Patient is concerned this is related to metoprolol, although I am not certain this is the case.  Methimazole can be associated with rash.  Since symptom is mild and given patient's concern, we can transition to propranolol.  She will update me if rash does not improve with this change or progresses.    PLAN  -Change methimazole as follows: take 10 mg (TWO 5 mg tabs) twice daily for a week; after a week, change to 5 mg (ONE tab) three times a day and continue at this dose  -Start Propranolol 120 mg once daily  -Stop metoprolol when start propranolol  -Please update me if rash persists or is worsening over the coming 1-2 weeks  -Check pulse daily at home after 5  minutes of rest: update me if pulse if greater than 90 or less than 60  -Nurse visit in 1 week for blood pressure and pulse check  -Labs in 2 weeks  -Return for a follow-up visit in 2 months  -We will communicate results via [place], or if needed by phone      Orders Placed This Encounter   Procedures     T3 total     Hepatic function panel     Thyroxine total       I spent a total of 61 minutes on the date of encounter reviewing medical records, evaluating the patient, coordinating care and documenting in the EHR, as detailed above.      Matthew Echols MD   Division of Diabetes, Endocrinology and Metabolism  Department of Medicine

## 2022-02-23 NOTE — LETTER
2/23/2022         RE: Yusuf Stubbs  [address] [place] [address]        Dear Colleague,    Thank you for referring your patient, Yusuf Stubbs, to the [facility]. Please see a copy of my visit note below.      ENDOCRINOLOGY FOLLOW-UP         HISTORY OF PRESENT ILLNESS    Yusuf Stubbs is seen in follow-up for the issues outlined below.     The patient is a 23 weeks and 1 day of gestation.    After initial virtual visit with me on 1/12/2022, we continued methimazole and metoprolol, with plan to check follow-up thyroid function test.  Due to a rapid decline in T4 and T3 levels, I recommended reducing methimazole to 5 mg twice daily (instead of 5 mg twice daily) and the patient confirms she made this change around 2/4/2022.      We increased metoprolol to 50 mg daily on 2/4/2022 due to persistent tachycardia.    She feels quite tired, often has to rest over the course of the day and sleep for longer periods of time overnight.  She notes that she has had significant palpitations and heat intolerance, although not as much tremulousness.  Notes a fullness in her neck and some discomfort with swallowing foods.  Eye symptoms are stable: Some nighttime difficulty with vision (specifically glare with lights) but no diplopia.    She has noted a rash on the medial aspect of her elbow on the left arm as well as behind her knees: She notes that she has had a similar rash in the past but wonders if it may be related to metoprolol since it appeared when dose was adjusted upward.    Was seen in [facility] and by her family OB physician on 2/1/2021.    She had [facility] ultrasound earlier today: 1) Hughes intrauterine pregnancy at 22w 1d gestational age by 13w 0d week ultrasound. 2) None of the anomalies commonly detected by ultrasound were evident in the detailed fetal anatomic survey described above. 3) Growth parameters and estimated fetal weight were consistent with an appropriate for gestation age pattern  of growth. 4) The amniotic fluid volume appeared normal. 5) The cervix appeared long and closed by transabdominal imaging.     Pertinent endocrine and related history:  1.  Hypothyroidism, due to Graves' disease.  Was initially diagnosed with hyperthyroidism secondary to Graves Disease in . She had been pregnant at the time. She was started on methimazole 15 mg daily. At the time of initial presentation she had been experiencing weight loss of 20 pounds, palpitations, heat intolerance and anxiety.   -She followed with endocrinology and was treated through another pregnancy in 2017.  She recalls that both of the children she had with hyperthyroidism during pregnancy ultimately needed treatment with methimazole in infancy.    -Radioactive iodine ablation was contemplated (initially deferred due to concern for worsening of thyroid eye disease) and ultimately pursued due to patient preference: Radioactive iodine uptake scan revealed an 80% uptake. She underwent radioactive iodine ablation on 2019 and received 14.6 mCi.   -She was last seen by Endocrinology at [facility] in 2019 by Dr. Sharma at which time the patient was prescribed methimazole (the medication was resumed due to persistent hyperthyroidism after radioiodine therapy).  2.  History of thyroid related eye disease.  3.  Pregnancy.  During this pregnancy, she was initially started on PTU 50 mg daily in 2021 (prescribed 2021) then transitioned to methimazole 5 mg TID on 22 at 16 weeks gestation.     PAST MEDICAL HISTORY  Past Medical History:   Diagnosis Date     Endocrine disease      Hypertension      Hyperthyroidism 2016     Precipitous delivery, delivered (current hospitalization) 2016      delivery 2016     Unspecified fetal growth retardation, unspecified (weight)      Urinary tract infection        MEDICATIONS  Current Outpatient Medications   Medication Sig Dispense Refill     acetaminophen (TYLENOL) 500  MG tablet Take 500-1,000 mg by mouth every 6 hours as needed for mild pain       aspirin (ASA) 81 MG EC tablet Take 1 tablet (81 mg) by mouth daily 100 tablet 2     methimazole (TAPAZOLE) 5 MG tablet Take 1 tablet (5 mg) by mouth 2 times daily (Patient taking differently: Take 10 mg by mouth daily ) 90 tablet 0     metoprolol succinate ER (TOPROL-XL) 50 MG 24 hr tablet Take 1 tablet (50 mg) by mouth daily 30 tablet 3     Prenatal Vit-Fe Fumarate-FA (PRENATAL MULTIVITAMIN W/IRON) 27-0.8 MG tablet Take 1 tablet by mouth daily 90 tablet 3       Allergies, family, and social history were reviewed and documented as needed in EHR.     REVIEW OF SYSTEMS  A focused ROS was performed, with pertinent positives and negatives as noted in the HPI.    PHYSICAL EXAM  /70   Pulse 116   Wt 49.4 kg (108 lb 14.4 oz)   LMP 09/14/2021 (Exact Date)   BMI 23.57 kg/m    Body mass index is 23.57 kg/m .  Constitutional: Vital signs reviewed, as recorded above. Patient is alert, oriented and appears in no acute distress.  Eyes: Bilateral stare without lid lag or retraction, no conjunctival injection.  No diplopia on lateral gaze.  ENMT: OP clear with moist MM. Lips are without lesions.   Neck: Neck supple, thyroid is at least 2 times the size of normal, without palpable nodules.  Some tenderness on exam.  Lymphatic: No cervical or supraclavicular LAD.  Cardiovascular: Tachycardic, regular rhythm, normal S1/S2, no lower extremity edema.  Respiratory: CTAB, without wheezes, crackles or rhonchi; normal chest wall motion and respiratory effort.  GI: Gravid.  MSK: No clubbing or cyanosis; normal muscle bulk and tone.  Skin: Normal skin color, temperature, turgor and texture, no purple striae.  Neurological: Alert and oriented times 3. No tremor, reflexes 2+ and symmetric in biceps, with brisk relaxation phase.     DATA REVIEW  Each of the following laboratory and/or imaging studies were reviewed.     1/25/2022 13:52 2/21/2022 14:02    Albumin  3.2 (L)   Protein Total  6.6   Bilirubin Total  0.5   Alkaline Phosphatase  157 (H)   ALT  <9   AST  12   Bilirubin Direct  0.2   Thyroid Stim Immunog 4.8 (H)    Triiodothyronine (T3) 373 (H) 489 (H)   TSH <0.01 (L) <0.01 (L)   Thyrotropin Receptor Antibody 31 (H)    T4 Total 19.7 (H) 28.5 (H)     ASSESSMENT  1.  Hyperthyroidism.  Due to Graves' disease.  Thyrotoxic, with more marked elevation in total T4 after we reduced methimazole dose: May in part be due to insufficient antithyroid drug therapy (although she had rapid decline in T4 and T3 levels with methimazole) or a change in underlying autoimmune disease.  We will titrate methimazole upward and recheck thyroid function tests in 2 weeks, once again aiming for total T4 and T3 levels 1.5 times upper limit of normal.  Given concerns about possible rash with metoprolol, we will change beta-blocker to propranolol and recheck pulse and blood pressure in clinic in 1 week (patient will monitor pulse at home in the meantime).  She had markedly positive TSH receptor antibody and TSI: We discussed potential for development of fetal thyrotoxicosis (she has had 2 pregnancies where her children required methimazole therapy after delivery).  We will recheck serology for Graves' disease early in the third trimester to monitor evolution.    2.  Graves' disease.  Some nighttime visual symptoms with glare.  We placed referral at initial visit and patient has an appointment in June.  Monitor TSI and TSH receptor antibody as above.    3.  Rash.  Patient is concerned this is related to metoprolol, although I am not certain this is the case.  Methimazole can be associated with rash.  Since symptom is mild and given patient's concern, we can transition to propranolol.  She will update me if rash does not improve with this change or progresses.    PLAN  -Change methimazole as follows: take 10 mg (TWO 5 mg tabs) twice daily for a week; after a week, change to 5 mg (ONE tab)  three times a day and continue at this dose  -Start Propranolol 120 mg once daily  -Stop metoprolol when start propranolol  -Please update me if rash persists or is worsening over the coming 1-2 weeks  -Check pulse daily at home after 5 minutes of rest: update me if pulse if greater than 90 or less than 60  -Nurse visit in 1 week for blood pressure and pulse check  -Labs in 2 weeks  -Return for a follow-up visit in 2 months  -We will communicate results via [facility], or if needed by phone      Orders Placed This Encounter   Procedures     T3 total     Hepatic function panel     Thyroxine total       I spent a total of 61 minutes on the date of encounter reviewing medical records, evaluating the patient, coordinating care and documenting in the EHR, as detailed above.      Cortez Echols MD   Division of Diabetes, Endocrinology and Metabolism  Department of Medicine              Again, thank you for allowing me to participate in the care of your patient.        Sincerely,        CORTEZ Echols MD

## 2022-03-02 ENCOUNTER — ALLIED HEALTH/NURSE VISIT (OUTPATIENT)
Dept: ENDOCRINOLOGY | Facility: CLINIC | Age: 27
End: 2022-03-02
Payer: COMMERCIAL

## 2022-03-02 VITALS — HEART RATE: 88 BPM | SYSTOLIC BLOOD PRESSURE: 124 MMHG | DIASTOLIC BLOOD PRESSURE: 68 MMHG

## 2022-03-02 DIAGNOSIS — E05.90 HYPERTHYROIDISM: Primary | ICD-10-CM

## 2022-03-02 PROCEDURE — 99207 PR NO CHARGE NURSE ONLY: CPT

## 2022-03-02 NOTE — PROGRESS NOTES
Pt in for BP and Pulse check with Reyna Victoria MA. The pt states that she is feeling better, she is less tired and has more energy, pt is feeling well, per Reyna Victoria MA.

## 2022-03-07 ENCOUNTER — DOCUMENTATION ONLY (OUTPATIENT)
Dept: FAMILY MEDICINE | Facility: CLINIC | Age: 27
End: 2022-03-07
Payer: COMMERCIAL

## 2022-03-09 ENCOUNTER — LAB (OUTPATIENT)
Dept: LAB | Facility: CLINIC | Age: 27
End: 2022-03-09
Payer: COMMERCIAL

## 2022-03-09 DIAGNOSIS — E05.90 HYPERTHYROIDISM: ICD-10-CM

## 2022-03-09 LAB
ALBUMIN SERPL-MCNC: 2.9 G/DL (ref 3.5–5)
ALP SERPL-CCNC: 177 U/L (ref 45–120)
ALT SERPL W P-5'-P-CCNC: <9 U/L (ref 0–45)
AST SERPL W P-5'-P-CCNC: 12 U/L (ref 0–40)
BILIRUB DIRECT SERPL-MCNC: 0.2 MG/DL
BILIRUB SERPL-MCNC: 0.5 MG/DL (ref 0–1)
PROT SERPL-MCNC: 6.1 G/DL (ref 6–8)
T3 SERPL-MCNC: 321 NG/DL (ref 60–181)
T4 SERPL-MCNC: 19 UG/DL (ref 4.5–13)

## 2022-03-09 PROCEDURE — 84480 ASSAY TRIIODOTHYRONINE (T3): CPT

## 2022-03-09 PROCEDURE — 84436 ASSAY OF TOTAL THYROXINE: CPT

## 2022-03-09 PROCEDURE — 36415 COLL VENOUS BLD VENIPUNCTURE: CPT

## 2022-03-09 PROCEDURE — 80076 HEPATIC FUNCTION PANEL: CPT

## 2022-03-30 ENCOUNTER — ANCILLARY PROCEDURE (OUTPATIENT)
Dept: ULTRASOUND IMAGING | Facility: HOSPITAL | Age: 27
End: 2022-03-30
Attending: OBSTETRICS & GYNECOLOGY
Payer: COMMERCIAL

## 2022-03-30 ENCOUNTER — OFFICE VISIT (OUTPATIENT)
Dept: MATERNAL FETAL MEDICINE | Facility: HOSPITAL | Age: 27
End: 2022-03-30
Attending: OBSTETRICS & GYNECOLOGY
Payer: COMMERCIAL

## 2022-03-30 ENCOUNTER — LAB (OUTPATIENT)
Dept: LAB | Facility: CLINIC | Age: 27
End: 2022-03-30
Payer: COMMERCIAL

## 2022-03-30 DIAGNOSIS — E05.90 HYPERTHYROIDISM AFFECTING PREGNANCY IN SECOND TRIMESTER: ICD-10-CM

## 2022-03-30 DIAGNOSIS — O99.282 HYPERTHYROIDISM AFFECTING PREGNANCY IN SECOND TRIMESTER: ICD-10-CM

## 2022-03-30 DIAGNOSIS — O16.2 HYPERTENSION AFFECTING PREGNANCY IN SECOND TRIMESTER: Primary | ICD-10-CM

## 2022-03-30 DIAGNOSIS — E05.90 HYPERTHYROIDISM: ICD-10-CM

## 2022-03-30 LAB
ALBUMIN SERPL-MCNC: 2.7 G/DL (ref 3.5–5)
ALP SERPL-CCNC: 207 U/L (ref 45–120)
ALT SERPL W P-5'-P-CCNC: <9 U/L (ref 0–45)
ANION GAP SERPL CALCULATED.3IONS-SCNC: 10 MMOL/L (ref 5–18)
AST SERPL W P-5'-P-CCNC: 11 U/L (ref 0–40)
BILIRUB SERPL-MCNC: 0.5 MG/DL (ref 0–1)
BUN SERPL-MCNC: 4 MG/DL (ref 8–22)
CALCIUM SERPL-MCNC: 8.3 MG/DL (ref 8.5–10.5)
CHLORIDE BLD-SCNC: 109 MMOL/L (ref 98–107)
CO2 SERPL-SCNC: 20 MMOL/L (ref 22–31)
CREAT SERPL-MCNC: 0.45 MG/DL (ref 0.6–1.1)
GFR SERPL CREATININE-BSD FRML MDRD: >90 ML/MIN/1.73M2
GLUCOSE BLD-MCNC: 122 MG/DL (ref 70–125)
POTASSIUM BLD-SCNC: 3.8 MMOL/L (ref 3.5–5)
PROT SERPL-MCNC: 5.8 G/DL (ref 6–8)
SODIUM SERPL-SCNC: 139 MMOL/L (ref 136–145)
T3 SERPL-MCNC: 460 NG/DL (ref 60–181)
T4 SERPL-MCNC: 18.9 UG/DL (ref 4.5–13)

## 2022-03-30 PROCEDURE — 84436 ASSAY OF TOTAL THYROXINE: CPT

## 2022-03-30 PROCEDURE — 99207 PR NO CHARGE LOS: CPT | Performed by: OBSTETRICS & GYNECOLOGY

## 2022-03-30 PROCEDURE — 80053 COMPREHEN METABOLIC PANEL: CPT

## 2022-03-30 PROCEDURE — 84480 ASSAY TRIIODOTHYRONINE (T3): CPT

## 2022-03-30 PROCEDURE — 76816 OB US FOLLOW-UP PER FETUS: CPT | Mod: 26 | Performed by: OBSTETRICS & GYNECOLOGY

## 2022-03-30 PROCEDURE — 76816 OB US FOLLOW-UP PER FETUS: CPT

## 2022-03-30 PROCEDURE — 36415 COLL VENOUS BLD VENIPUNCTURE: CPT

## 2022-03-30 NOTE — PROGRESS NOTES
Yusuf Veeo was seen for an ultrasound today at the [facility] center.      For the details of the ultrasound please see the report which can be found under the imaging tab.      Carolyn Manzanares MD  , OB/GYN  Maternal-Fetal Medicine  [email or web address]  [phone] (Main M Office)  [phone] or [phone] (for 24 hour MFM questions)  [phone] (Pager)

## 2022-04-01 DIAGNOSIS — E05.00 GRAVES DISEASE: ICD-10-CM

## 2022-04-01 DIAGNOSIS — E05.90 HYPERTHYROIDISM: Primary | ICD-10-CM

## 2022-04-01 RX ORDER — METHIMAZOLE 5 MG/1
TABLET ORAL
Qty: 120 TABLET | Refills: 3 | Status: SHIPPED | OUTPATIENT
Start: 2022-04-01 | End: 2022-05-01

## 2022-04-13 ENCOUNTER — TELEPHONE (OUTPATIENT)
Dept: OPHTHALMOLOGY | Facility: CLINIC | Age: 27
End: 2022-04-13
Payer: COMMERCIAL

## 2022-04-13 NOTE — TELEPHONE ENCOUNTER
LVM for patient regarding offered sooner appointment from the wait-list. Provided direct number for offered appointment on April 18th, 2022 at 1:30pm as available on a first come basis.

## 2022-04-20 ENCOUNTER — TELEPHONE (OUTPATIENT)
Dept: ENDOCRINOLOGY | Facility: CLINIC | Age: 27
End: 2022-04-20
Payer: COMMERCIAL

## 2022-04-20 NOTE — TELEPHONE ENCOUNTER
LVM to reschedule lab appointment and appointment with Dr Echols.   Okay to use pregnancy opening on 4/29/22 if patient is able to have labs drawn prior.

## 2022-04-25 ENCOUNTER — MYC MEDICAL ADVICE (OUTPATIENT)
Dept: ENDOCRINOLOGY | Facility: CLINIC | Age: 27
End: 2022-04-25
Payer: COMMERCIAL

## 2022-04-25 NOTE — TELEPHONE ENCOUNTER
Ms. Stubbs cancelled endocrinology appointment on 4/20/2022.  Clinic staff have reached out on 4/20/2022 and left message to reschedule appointment.  I have sent my chart message to follow-up on this on 4/25/2022 since I do not yet see an appointment in chart.  Matthew Echols MD

## 2022-04-26 ENCOUNTER — HOSPITAL ENCOUNTER (OUTPATIENT)
Facility: HOSPITAL | Age: 27
Discharge: HOME | End: 2022-04-26
Attending: FAMILY MEDICINE | Admitting: FAMILY MEDICINE
Payer: COMMERCIAL

## 2022-04-26 ENCOUNTER — OFFICE VISIT (OUTPATIENT)
Dept: FAMILY MEDICINE | Facility: CLINIC | Age: 27
End: 2022-04-26
Payer: COMMERCIAL

## 2022-04-26 VITALS — RESPIRATION RATE: 18 BRPM | SYSTOLIC BLOOD PRESSURE: 132 MMHG | TEMPERATURE: 98 F | DIASTOLIC BLOOD PRESSURE: 88 MMHG

## 2022-04-26 VITALS
HEIGHT: 57 IN | WEIGHT: 109 LBS | SYSTOLIC BLOOD PRESSURE: 134 MMHG | DIASTOLIC BLOOD PRESSURE: 78 MMHG | BODY MASS INDEX: 23.51 KG/M2 | HEART RATE: 82 BPM | RESPIRATION RATE: 22 BRPM

## 2022-04-26 DIAGNOSIS — E05.00 GRAVES DISEASE: ICD-10-CM

## 2022-04-26 DIAGNOSIS — Z36.89 ENCOUNTER FOR ULTRASOUND TO ASSESS FETAL GROWTH: ICD-10-CM

## 2022-04-26 DIAGNOSIS — Z87.51 HISTORY OF PRETERM DELIVERY: ICD-10-CM

## 2022-04-26 DIAGNOSIS — E05.90 HYPERTHYROIDISM: ICD-10-CM

## 2022-04-26 DIAGNOSIS — Z34.83 ENCOUNTER FOR SUPERVISION OF OTHER NORMAL PREGNANCY IN THIRD TRIMESTER: Primary | ICD-10-CM

## 2022-04-26 LAB
ABO/RH(D): NORMAL
ALBUMIN MFR UR ELPH: 30.6 MG/DL
ALBUMIN UR-MCNC: NEGATIVE MG/DL
ALT SERPL W P-5'-P-CCNC: <9 U/L (ref 0–45)
ANTIBODY SCREEN: NEGATIVE
AST SERPL W P-5'-P-CCNC: 13 U/L (ref 0–40)
CREAT SERPL-MCNC: 0.44 MG/DL (ref 0.6–1.1)
CREAT UR-MCNC: 135 MG/DL
ERYTHROCYTE [DISTWIDTH] IN BLOOD BY AUTOMATED COUNT: 11.9 % (ref 10–15)
ERYTHROCYTE [DISTWIDTH] IN BLOOD BY AUTOMATED COUNT: 12.1 % (ref 10–15)
FERRITIN SERPL-MCNC: 31 NG/ML (ref 10–130)
GFR SERPL CREATININE-BSD FRML MDRD: >90 ML/MIN/1.73M2
GLUCOSE 1H P 50 G GLC PO SERPL-MCNC: 96 MG/DL (ref 70–129)
GLUCOSE UR STRIP-MCNC: NEGATIVE MG/DL
HCT VFR BLD AUTO: 36.4 % (ref 35–47)
HCT VFR BLD AUTO: 36.8 % (ref 35–47)
HGB BLD-MCNC: 11.9 G/DL (ref 11.7–15.7)
HGB BLD-MCNC: 12.1 G/DL (ref 11.7–15.7)
HOLD SPECIMEN: NORMAL
KETONES UR STRIP-MCNC: NEGATIVE MG/DL
MCH RBC QN AUTO: 26.1 PG (ref 26.5–33)
MCH RBC QN AUTO: 26.4 PG (ref 26.5–33)
MCHC RBC AUTO-ENTMCNC: 32.7 G/DL (ref 31.5–36.5)
MCHC RBC AUTO-ENTMCNC: 32.9 G/DL (ref 31.5–36.5)
MCV RBC AUTO: 79 FL (ref 78–100)
MCV RBC AUTO: 81 FL (ref 78–100)
PLATELET # BLD AUTO: 200 10E3/UL (ref 150–450)
PLATELET # BLD AUTO: 201 10E3/UL (ref 150–450)
PROT/CREAT 24H UR: 0.23 MG/MG CR
RBC # BLD AUTO: 4.51 10E6/UL (ref 3.8–5.2)
RBC # BLD AUTO: 4.64 10E6/UL (ref 3.8–5.2)
SPECIMEN EXPIRATION DATE: NORMAL
WBC # BLD AUTO: 7.8 10E3/UL (ref 4–11)
WBC # BLD AUTO: 8.7 10E3/UL (ref 4–11)

## 2022-04-26 PROCEDURE — 86780 TREPONEMA PALLIDUM: CPT | Performed by: FAMILY MEDICINE

## 2022-04-26 PROCEDURE — 36415 COLL VENOUS BLD VENIPUNCTURE: CPT | Performed by: FAMILY MEDICINE

## 2022-04-26 PROCEDURE — G0463 HOSPITAL OUTPT CLINIC VISIT: HCPCS

## 2022-04-26 PROCEDURE — 82728 ASSAY OF FERRITIN: CPT | Performed by: FAMILY MEDICINE

## 2022-04-26 PROCEDURE — 99207 PR COMPLICATED OB VISIT: CPT | Performed by: FAMILY MEDICINE

## 2022-04-26 PROCEDURE — 84460 ALANINE AMINO (ALT) (SGPT): CPT | Performed by: FAMILY MEDICINE

## 2022-04-26 PROCEDURE — 85014 HEMATOCRIT: CPT | Performed by: FAMILY MEDICINE

## 2022-04-26 PROCEDURE — 86901 BLOOD TYPING SEROLOGIC RH(D): CPT | Performed by: FAMILY MEDICINE

## 2022-04-26 PROCEDURE — 82950 GLUCOSE TEST: CPT | Performed by: FAMILY MEDICINE

## 2022-04-26 PROCEDURE — 84450 TRANSFERASE (AST) (SGOT): CPT | Performed by: FAMILY MEDICINE

## 2022-04-26 PROCEDURE — 84480 ASSAY TRIIODOTHYRONINE (T3): CPT

## 2022-04-26 PROCEDURE — 83550 IRON BINDING TEST: CPT | Performed by: FAMILY MEDICINE

## 2022-04-26 PROCEDURE — 84436 ASSAY OF TOTAL THYROXINE: CPT

## 2022-04-26 PROCEDURE — 82565 ASSAY OF CREATININE: CPT | Performed by: FAMILY MEDICINE

## 2022-04-26 PROCEDURE — 84156 ASSAY OF PROTEIN URINE: CPT | Performed by: FAMILY MEDICINE

## 2022-04-26 PROCEDURE — 85027 COMPLETE CBC AUTOMATED: CPT | Performed by: FAMILY MEDICINE

## 2022-04-26 PROCEDURE — 81003 URINALYSIS AUTO W/O SCOPE: CPT | Performed by: FAMILY MEDICINE

## 2022-04-26 RX ORDER — LIDOCAINE 40 MG/G
CREAM TOPICAL
Status: DISCONTINUED | OUTPATIENT
Start: 2022-04-26 | End: 2022-04-26 | Stop reason: HOSPADM

## 2022-04-26 RX ORDER — SODIUM CHLORIDE, SODIUM LACTATE, POTASSIUM CHLORIDE, CALCIUM CHLORIDE 600; 310; 30; 20 MG/100ML; MG/100ML; MG/100ML; MG/100ML
10-125 INJECTION, SOLUTION INTRAVENOUS CONTINUOUS
Status: DISCONTINUED | OUTPATIENT
Start: 2022-04-26 | End: 2022-04-26

## 2022-04-26 RX ORDER — MAGNESIUM SULFATE HEPTAHYDRATE 500 MG/ML
10 INJECTION, SOLUTION INTRAMUSCULAR; INTRAVENOUS
Status: DISCONTINUED | OUTPATIENT
Start: 2022-04-26 | End: 2022-04-26

## 2022-04-26 RX ORDER — LORAZEPAM 2 MG/ML
2 INJECTION INTRAMUSCULAR
Status: DISCONTINUED | OUTPATIENT
Start: 2022-04-26 | End: 2022-04-26

## 2022-04-26 RX ORDER — MAGNESIUM SULFATE 4 G/50ML
4 INJECTION INTRAVENOUS
Status: DISCONTINUED | OUTPATIENT
Start: 2022-04-26 | End: 2022-04-26

## 2022-04-26 RX ORDER — MAGNESIUM SULFATE HEPTAHYDRATE 40 MG/ML
2 INJECTION, SOLUTION INTRAVENOUS
Status: DISCONTINUED | OUTPATIENT
Start: 2022-04-26 | End: 2022-04-26

## 2022-04-26 NOTE — PROGRESS NOTES
Dr White updated on patient status, Fetal tracing and lab results. POC okay to discharge home. Clinic will contact patient tomorrow regarding follow up Plan of care.    Discharge instructions gone through with patient, verbalized understanding, questions and concerns answered.

## 2022-04-26 NOTE — DISCHARGE INSTRUCTIONS
Discharge Instruction for Undelivered Patients      You were seen for: Fetal Assessment  We Consulted: Dr White     Diet:   Drink 8 to 12 glasses of liquids (milk, juice, water) every day.  You may eat meals and snacks.     Activity:  Call your doctor or nurse midwife if your baby is moving less than usual.     Call your provider if you notice:  Swelling in your face or increased swelling in your hands or legs.  Headaches that are not relieved by Tylenol (acetaminophen).  Changes in your vision (blurring: seeing spots or stars.)  Nausea (sick to your stomach) and vomiting (throwing up).   Weight gain of 5 pounds or more per week.  Heartburn that doesn't go away.  Signs of bladder infection: pain when you urinate (use the toilet), need to go more often and more urgently.  The bag of del valle (rupture of membranes) breaks, or you notice leaking in your underwear.  Bright red blood in your underwear.  Abdominal (lower belly) or stomach pain.  For first baby: Contractions (tightening) less than 5 minutes apart for one hour or more.  Second (plus) baby: Contractions (tightening) less than 10 minutes apart and getting stronger.  *If less than 34 weeks: Contractions (tightening) more than 6 times in one hour.  Increase or change in vaginal discharge (note the color and amount)      Follow-up:  Dr White's clinic will contact you for follow up Plan of care

## 2022-04-27 ENCOUNTER — HOSPITAL ENCOUNTER (OUTPATIENT)
Facility: HOSPITAL | Age: 27
Discharge: HOME | End: 2022-04-27
Attending: FAMILY MEDICINE | Admitting: FAMILY MEDICINE
Payer: COMMERCIAL

## 2022-04-27 ENCOUNTER — HOSPITAL ENCOUNTER (OUTPATIENT)
Facility: HOSPITAL | Age: 27
End: 2022-04-27
Admitting: FAMILY MEDICINE
Payer: COMMERCIAL

## 2022-04-27 ENCOUNTER — APPOINTMENT (OUTPATIENT)
Dept: ULTRASOUND IMAGING | Facility: HOSPITAL | Age: 27
End: 2022-04-27
Attending: FAMILY MEDICINE
Payer: COMMERCIAL

## 2022-04-27 ENCOUNTER — TELEPHONE (OUTPATIENT)
Dept: FAMILY MEDICINE | Facility: CLINIC | Age: 27
End: 2022-04-27
Payer: COMMERCIAL

## 2022-04-27 VITALS
RESPIRATION RATE: 18 BRPM | SYSTOLIC BLOOD PRESSURE: 129 MMHG | TEMPERATURE: 98.4 F | BODY MASS INDEX: 23.95 KG/M2 | HEART RATE: 107 BPM | WEIGHT: 111 LBS | HEIGHT: 57 IN | DIASTOLIC BLOOD PRESSURE: 83 MMHG

## 2022-04-27 LAB
IRON SATN MFR SERPL: 10 % (ref 15–46)
IRON SERPL-MCNC: 47 UG/DL (ref 35–180)
T PALLIDUM AB SER QL: NONREACTIVE
T3 SERPL-MCNC: 393 NG/DL (ref 60–181)
T4 SERPL-MCNC: 23.6 UG/DL (ref 4.5–13)
TIBC SERPL-MCNC: 481 UG/DL (ref 240–430)

## 2022-04-27 PROCEDURE — 76819 FETAL BIOPHYS PROFIL W/O NST: CPT

## 2022-04-27 PROCEDURE — 59025 FETAL NON-STRESS TEST: CPT | Mod: 76

## 2022-04-27 NOTE — TELEPHONE ENCOUNTER
Reason for Call:  Other returning call    Detailed comments: Patient calls states patient received a voice mail stating for patient to return a call to schedule a ultrasound. Writer check chart and there was no documentation of call and no order for ultrasound. Please call patient back.    Phone Number Patient can be reached at: Cell number on file:    Telephone Information:   Mobile [phone]       Best Time: Anytime    Can we leave a detailed message on this number? YES    Call taken on 4/27/2022 at 10:47 AM by Mimi Taylor

## 2022-04-27 NOTE — PROVIDER NOTIFICATION
04/27/22 1146 04/27/22 1200   Vital Signs   BP (!) 139/98 129/83       Yusuf is here for an NST and BPP with growth. Monitors applied and orientated to call light.     RN notified Dr. White of blood pressures. NST was reactive.     Waiting for results of ultrasound.

## 2022-04-27 NOTE — DISCHARGE INSTRUCTIONS
Discharge Instruction for Undelivered Patients      You were seen for:  NST and BPP  We Consulted: Dr. White        Diet:   Regular     Activity:  Count fetal kicks everyday (see handout)     Call your provider if you notice:  Swelling in your face or increased swelling in your hands or legs.  Headaches that are not relieved by Tylenol (acetaminophen).  Changes in your vision (blurring: seeing spots or stars.)  Nausea (sick to your stomach) and vomiting (throwing up).   Weight gain of 5 pounds or more per week.  Heartburn that doesn't go away.  Signs of bladder infection: pain when you urinate (use the toilet), need to go more often and more urgently.  The bag of del valle (rupture of membranes) breaks, or you notice leaking in your underwear.  Bright red blood in your underwear.  Abdominal (lower belly) or stomach pain.  For first baby: Contractions (tightening) less than 5 minutes apart for one hour or more.  Second (plus) baby: Contractions (tightening) less than 10 minutes apart and getting stronger.  *If less than 34 weeks: Contractions (tightening) more than 6 times in one hour.  Increase or change in vaginal discharge (note the color and amount)  Other: ***    Follow-up:  As scheduled in the clinic: MFM will contact you

## 2022-04-27 NOTE — PROGRESS NOTES
"Subjective:  26 year old  at 31w1d  No ctx, lof, bleeding, or dc.  No HA or vision changes.    Feels quite tired.  2-3  Naps per day  Sleeps 9 hours at night (but wakes every 30 minutes)  Reports taking methimazole 10 mg BID  Propanolol as well.    [facility] has recommended growth scans.  None are scheduled.    Outpatient Medications Prior to Visit   Medication Sig Dispense Refill     acetaminophen (TYLENOL) 500 MG tablet Take 500-1,000 mg by mouth every 6 hours as needed for mild pain       aspirin (ASA) 81 MG EC tablet Take 1 tablet (81 mg) by mouth daily 100 tablet 2     methimazole (TAPAZOLE) 5 MG tablet 10 mg PO BID. 120 tablet 3     Prenatal Vit-Fe Fumarate-FA (PRENATAL MULTIVITAMIN W/IRON) 27-0.8 MG tablet Take 1 tablet by mouth daily 90 tablet 3     propranolol ER (INDERAL LA) 120 MG 24 hr capsule Take 1 capsule (120 mg) by mouth daily 30 capsule 5     No facility-administered medications prior to visit.      History   Smoking Status     Former Smoker     Types: Cigarettes   Smokeless Tobacco     Never Used      Objective:  /78 (BP Location: Right arm, Patient Position: Sitting, Cuff Size: Adult Regular)   Pulse 82   Resp 22   Ht 1.448 m (4' 9\")   Wt 49.4 kg (109 lb)   LMP 2021 (Exact Date)   BMI 23.59 kg/m    GENERAL: alert, not distressed  CHEST: clear, no rales, rhonchi, or wheezes  CARDIAC: regular without murmur, gallop, or rub  ABDOMEN: gravid, soft, non tender, non distended    Recent Results (from the past 240 hour(s))   Urinalysis, OB Screen    Collection Time: 22  2:33 PM   Result Value Ref Range    Glucose Urine Negative Negative mg/dL    Ketones Urine Negative Negative mg/dL    Protein Albumin Urine Negative Negative mg/dL   CBC with platelets    Collection Time: 22  3:32 PM   Result Value Ref Range    WBC Count 8.7 4.0 - 11.0 10e3/uL    RBC Count 4.64 3.80 - 5.20 10e6/uL    Hemoglobin 12.1 11.7 - 15.7 g/dL    Hematocrit 36.8 35.0 - 47.0 %    MCV 79 78 - 100 fL    " MCH 26.1 (L) 26.5 - 33.0 pg    MCHC 32.9 31.5 - 36.5 g/dL    RDW 12.1 10.0 - 15.0 %    Platelet Count 201 150 - 450 10e3/uL   Ferritin    Collection Time: 04/26/22  3:32 PM   Result Value Ref Range    Ferritin 31 10 - 130 ng/mL   Iron and iron binding capacity    Collection Time: 04/26/22  3:32 PM   Result Value Ref Range    Iron 47 35 - 180 ug/dL    Iron Binding Capacity 481 (H) 240 - 430 ug/dL    Iron Sat Index 10 (L) 15 - 46 %   Glucose tolerance, gest screen, 1 hour    Collection Time: 04/26/22  3:32 PM   Result Value Ref Range    Glu Gest Screen 1hr 50g 96 70 - 129 mg/dL   Treponema Abs w Reflex to RPR and Titer    Collection Time: 04/26/22  3:32 PM   Result Value Ref Range    Treponema Antibody Total Nonreactive Nonreactive   CBC with platelets    Collection Time: 04/26/22  4:39 PM   Result Value Ref Range    WBC Count 7.8 4.0 - 11.0 10e3/uL    RBC Count 4.51 3.80 - 5.20 10e6/uL    Hemoglobin 11.9 11.7 - 15.7 g/dL    Hematocrit 36.4 35.0 - 47.0 %    MCV 81 78 - 100 fL    MCH 26.4 (L) 26.5 - 33.0 pg    MCHC 32.7 31.5 - 36.5 g/dL    RDW 11.9 10.0 - 15.0 %    Platelet Count 200 150 - 450 10e3/uL   AST    Collection Time: 04/26/22  4:39 PM   Result Value Ref Range    AST 13 0 - 40 U/L   ALT    Collection Time: 04/26/22  4:39 PM   Result Value Ref Range    ALT <9 0 - 45 U/L   Creatinine    Collection Time: 04/26/22  4:39 PM   Result Value Ref Range    Creatinine 0.44 (L) 0.60 - 1.10 mg/dL    GFR Estimate >90 >60 mL/min/1.73m2   Adult Type and Screen    Collection Time: 04/26/22  4:39 PM   Result Value Ref Range    ABO/RH(D) B POS     Antibody Screen Negative Negative    SPECIMEN EXPIRATION DATE [id number]    Extra Red Top Tube    Collection Time: 04/26/22  4:39 PM   Result Value Ref Range    Hold Specimen JIC    Thyroxine total    Collection Time: 04/26/22  4:39 PM   Result Value Ref Range    T4 Total 23.6 (H) 4.5 - 13.0 ug/dL   T3, total    Collection Time: 04/26/22  4:39 PM   Result Value Ref Range    T3  Total 393 (H) 60 - 181 ng/dL   Protein  random urine    Collection Time: 22  5:20 PM   Result Value Ref Range    Total Protein Urine mg/dL 30.6 mg/dL    Total Protein UR MG/MG CR 0.23 mg/mg Cr    Creatinine Urine mg/dL 135 mg/dL        Assessment and Plan:   1. Encounter for supervision of other normal pregnancy in third trimester  Sent from clinic to [facility] for elevated fetal heart rate.  This improved while in [facility] and patient was discharged.  FHR baseline 155 with moderate variability and +accelerations, without decelerations  BP originally elevated, then back to normal ranges.    2. Hyperthyroidism  3. Graves disease  Does not appear controlled  Has not been to see endocrine lately.  Missed an appointment.  3/30 total T3 460  3/30 totat T4 19.0- T3, total; Future  Added repeat labs  Work to connect with endocrine    4. History of  delivery  Not on progesterone per patient's choice.  She felt she could not commit to weekly injections.    5. Encounter for ultrasound to assess fetal growth       Return in 2 weeks (on 5/10/2022) for prenatal care.   Future Appointments   Date Time Provider Department Center   2022  2:00 PM Victorino White MD [facility]   2022  1:30 PM Stanton Ybarra MD [facility]

## 2022-04-27 NOTE — TELEPHONE ENCOUNTER
Please advise patient to go to [place] for ultrasound today.  She should go to maternity care like yesterday  They are aware she is coming.

## 2022-04-27 NOTE — TELEPHONE ENCOUNTER
Contacted patient and relayed message below from Dr White and patient agreed with plan.    Yi Álvarez RN  [facility]      Please advise patient to go to [place] for ultrasound today.  She should go to maternity care like yesterday  They are aware she is coming.

## 2022-04-27 NOTE — PROGRESS NOTES
Dr. White reviewed the BPP and ordered discharge home today. RN notified Yusuf that M will contact her to make some appointments for next week.    She signed for discharge with no questions

## 2022-04-28 ENCOUNTER — TELEPHONE (OUTPATIENT)
Dept: FAMILY MEDICINE | Facility: CLINIC | Age: 27
End: 2022-04-28
Payer: COMMERCIAL

## 2022-04-28 NOTE — TELEPHONE ENCOUNTER
----- Message from Victorino White MD sent at 4/28/2022  8:16 AM CDT -----  Call:  Thyroid labs are still too high.  I want you to see the thyroid specialist again soon.  Someone should be calling to help you schedule that.

## 2022-04-29 ENCOUNTER — TELEPHONE (OUTPATIENT)
Dept: FAMILY MEDICINE | Facility: CLINIC | Age: 27
End: 2022-04-29
Payer: COMMERCIAL

## 2022-04-29 ENCOUNTER — MYC MEDICAL ADVICE (OUTPATIENT)
Dept: FAMILY MEDICINE | Facility: CLINIC | Age: 27
End: 2022-04-29
Payer: COMMERCIAL

## 2022-04-29 NOTE — TELEPHONE ENCOUNTER
"Reviewed chart- pt had one covid vaccine 22 but with pregnancy hasn't done any more vaccines.     Did have covid fall -testing positive 21 (Delta likely)     31 weeks pregnant now.     She would qualify for treatment as she is high risk for complications from covid due to pregnancy.  She should be offered a virtual appointment in next 24hrs to meet with a provider who treats covid 19 in our system.      Please help her schedule a follow-up with Dr White next week on  or - can be in clinic with mask on as long as her symptoms are improving.     TO ER if symptoms worsen as below.     Numbness- likely pregnancy related- continue to monitor. Maybe send Dr White a [employer] message with more details.     When to call the provider -- Outpatients should be followed closely for progression to severe or critical disease and given instructions for infection control, symptom management, warning symptoms, and obstetric follow-up (at least once within two weeks of COVID-19 diagnosis [28]). In general, pregnant outpatients should call their provider (or seek emergency medical care) if they experience any of the following [2,28,29]:  ?Worsening dyspnea (see \"COVID-19: Outpatient evaluation and management of acute illness in adults\", section on 'Reevaluation for worsening dyspnea')  ?Respiratory rate ?20 to 24 breaths/minute and/or heart rate >100 beats per minute  ?Oxygen saturation (SpO2) <95% (if the patient has access to an over-the-counter pulse oximeter)  ?Unremitting fever >39 C despite appropriate use of acetaminophen  ?Inability to tolerate oral hydration and medications  ?Persistent pleuritic chest pain  ?Confusion or other alterations in mentation  ?Obstetric complications (eg,  contractions, vaginal bleeding, rupture of membranes)  ?Decreased fetal movement       "

## 2022-04-29 NOTE — TELEPHONE ENCOUNTER
"Carolyn Brush MD 6 minutes ago (1:09 PM)          Reviewed chart- pt had one covid vaccine 22 but with pregnancy hasn't done any more vaccines.      Did have covid fall -testing positive 21 (Delta likely)      31 weeks pregnant now.      She would qualify for treatment as she is high risk for complications from covid due to pregnancy.  She should be offered a virtual appointment in next 24hrs to meet with a provider who treats covid 19 in our system.       Please help her schedule a follow-up with Dr White next week on  or - can be in clinic with mask on as long as her symptoms are improving.      TO ER if symptoms worsen as below.      Numbness- likely pregnancy related- continue to monitor. Maybe send Dr White a [employer] message with more details.      When to call the provider -- Outpatients should be followed closely for progression to severe or critical disease and given instructions for infection control, symptom management, warning symptoms, and obstetric follow-up (at least once within two weeks of COVID-19 diagnosis [28]). In general, pregnant outpatients should call their provider (or seek emergency medical care) if they experience any of the following [2,28,29]:  ?Worsening dyspnea (see \"COVID-19: Outpatient evaluation and management of acute illness in adults\", section on 'Reevaluation for worsening dyspnea')  ?Respiratory rate ?20 to 24 breaths/minute and/or heart rate >100 beats per minute  ?Oxygen saturation (SpO2) <95% (if the patient has access to an over-the-counter pulse oximeter)  ?Unremitting fever >39 C despite appropriate use of acetaminophen  ?Inability to tolerate oral hydration and medications  ?Persistent pleuritic chest pain  ?Confusion or other alterations in mentation  ?Obstetric complications (eg,  contractions, vaginal bleeding, rupture of membranes)  ?Decreased fetal movement                      Masha King RN  [facility]    "

## 2022-04-29 NOTE — TELEPHONE ENCOUNTER
Please see telephone encounter for more information.             Masha King RN  [facility]

## 2022-04-29 NOTE — TELEPHONE ENCOUNTER
RN called patient regarding her mychart message:  Hi, I took a COVID home test this morning because I was coughing and having a minor headache and the test came out positive.  I tested all of my kids too and the only one have it is me and Nuvia.        Patient started having symptoms yesterday with cough and headache. Patient tested positive of COVID-19 today.      Patient has dry cough here and there. No fever. Tylenol helps with headache.      Patient is currently pregnant. Patient complains of right feet tingling but has subsided. Now, patient's left palm is tingling (numbing sensation).      Patient denied cramping, vaginal bleeding, or leaking fluid.       PCP is not in today. RN will route this encounter to  to review and advise.        Masha King RN  [facility]

## 2022-04-29 NOTE — TELEPHONE ENCOUNTER
RN called patient and relayed ' message below.       RN assisted patient to make an appointment with .   Appointments in Next    May 04, 2022  9:20 AM  (Arrive by 9:10 AM)  Return OB Visit with Victorino White MD  [facility] ([facility] ) [phone]       Patient verbalized understanding and agreed with the plan.       RN will route this encounter to Dr. White as SARAH.      Masha King RN  [facility]

## 2022-04-30 DIAGNOSIS — Z34.83 ENCOUNTER FOR SUPERVISION OF OTHER NORMAL PREGNANCY IN THIRD TRIMESTER: Primary | ICD-10-CM

## 2022-04-30 DIAGNOSIS — E05.00 GRAVES DISEASE: ICD-10-CM

## 2022-04-30 DIAGNOSIS — Z87.51 HISTORY OF PRETERM DELIVERY: ICD-10-CM

## 2022-04-30 DIAGNOSIS — E05.90 HYPERTHYROIDISM: ICD-10-CM

## 2022-05-01 DIAGNOSIS — E05.00 GRAVES DISEASE: ICD-10-CM

## 2022-05-01 RX ORDER — METHIMAZOLE 10 MG/1
10 TABLET ORAL 3 TIMES DAILY
Qty: 90 TABLET | Refills: 3 | Status: SHIPPED | OUTPATIENT
Start: 2022-05-01 | End: 2022-05-06

## 2022-05-02 ENCOUNTER — TRANSCRIBE ORDERS (OUTPATIENT)
Dept: MATERNAL FETAL MEDICINE | Facility: HOSPITAL | Age: 27
End: 2022-05-02
Payer: COMMERCIAL

## 2022-05-02 DIAGNOSIS — O26.90 PREGNANCY RELATED CONDITION: Primary | ICD-10-CM

## 2022-05-04 ENCOUNTER — PRENATAL OFFICE VISIT (OUTPATIENT)
Dept: FAMILY MEDICINE | Facility: CLINIC | Age: 27
End: 2022-05-04
Payer: COMMERCIAL

## 2022-05-04 VITALS
SYSTOLIC BLOOD PRESSURE: 136 MMHG | DIASTOLIC BLOOD PRESSURE: 74 MMHG | RESPIRATION RATE: 22 BRPM | HEART RATE: 60 BPM | TEMPERATURE: 98.2 F

## 2022-05-04 DIAGNOSIS — O16.9 HYPERTENSION IN PREGNANCY: ICD-10-CM

## 2022-05-04 DIAGNOSIS — E05.00 GRAVES DISEASE: Primary | ICD-10-CM

## 2022-05-04 DIAGNOSIS — Z34.83 ENCOUNTER FOR SUPERVISION OF OTHER NORMAL PREGNANCY IN THIRD TRIMESTER: Primary | ICD-10-CM

## 2022-05-04 DIAGNOSIS — E05.00 GRAVES DISEASE: ICD-10-CM

## 2022-05-04 PROBLEM — Z36.89 ENCOUNTER FOR TRIAGE IN PREGNANT PATIENT: Status: RESOLVED | Noted: 2022-04-26 | Resolved: 2022-05-04

## 2022-05-04 PROCEDURE — 99212 OFFICE O/P EST SF 10 MIN: CPT | Performed by: FAMILY MEDICINE

## 2022-05-04 PROCEDURE — 99207 PR PRENATAL VISIT: CPT | Performed by: FAMILY MEDICINE

## 2022-05-04 RX ORDER — METHIMAZOLE 10 MG/1
10 TABLET ORAL 3 TIMES DAILY
Qty: 90 TABLET | Refills: 3 | Status: CANCELLED | OUTPATIENT
Start: 2022-05-04

## 2022-05-04 NOTE — PROGRESS NOTES
Subjective:  26 year old  at 32w1d  No ctx, lof, bleeding, or dc.  No HA or vision changes.    Clarified history of methimazole  She has been taking 5 mg PO BID.  Discussed does change per endocrinology.  I will recommend that she increases to 10 mg BID for now, and discusses with endocrinology at her appointment on .    At home COVID test positive last week.  She did have a few symptoms of headache and stomach upset.  They have all resolved.  She has been asymptomatic for greater than 2 days.  She has past 5 days since symptom onset.  We discussed antivirals.  We decided that given the potential medication interactions of new and relatively unknown medicine we would avoid paxlovid for now.    Outpatient Medications Prior to Visit   Medication Sig Dispense Refill     acetaminophen (TYLENOL) 500 MG tablet Take 500-1,000 mg by mouth every 6 hours as needed for mild pain       aspirin (ASA) 81 MG EC tablet Take 1 tablet (81 mg) by mouth daily 100 tablet 2     methimazole (TAPAZOLE) 10 MG tablet Take 1 tablet (10 mg) by mouth 3 times daily 90 tablet 3     Prenatal Vit-Fe Fumarate-FA (PRENATAL MULTIVITAMIN W/IRON) 27-0.8 MG tablet Take 1 tablet by mouth daily 90 tablet 3     propranolol ER (INDERAL LA) 120 MG 24 hr capsule Take 1 capsule (120 mg) by mouth daily 30 capsule 5     No facility-administered medications prior to visit.      History   Smoking Status     Former Smoker     Types: Cigarettes   Smokeless Tobacco     Never Used      Objective:  /74 (BP Location: Left arm, Patient Position: Sitting, Cuff Size: Adult Regular)   Pulse 60   Temp 98.2  F (36.8  C) (Temporal)   Resp 22   LMP 2021 (Exact Date)   GENERAL: alert, not distressed  CHEST: clear, no rales, rhonchi, or wheezes  CARDIAC: regular without murmur, gallop, or rub  ABDOMEN: gravid, soft, non tender, non distended    NST:  Baseline 170 with trend toward 165  Moderate variabiltiy  Accelerations present 15x 15  No  decelerations  infrequent irregular uterine activity     Assessment and Plan:   1. Encounter for supervision of normal pregnancy in third trimester  Fetal heart rate likely relative to mother's hyperthyroidism.  Weekly NST with me and weekly BPP with MFM until delivered.  Discussed delivery around 37 weeks given uncontrolled hyperthyroidism    2. Graves disease  Med changes as above.  Follow up with Dr. Echols as scheduled.  - US Fetal Biophys Prof w/o Non Stress Test; Future     Return in about 1 week (around 5/11/2022) for Follow up.   Future Appointments   Date Time Provider Department Center   5/6/2022 11:00 AM Matthew Echols MD MDENDO [facility]   5/9/2022  2:30 PM [facility] US 1 YOHANA [facility]   [facility]20[facility]  [facility]   5/11/2022  9:20 AM Victorino White MD ICFMOYESENIA [facility]   5/16/2022  3:00 PM [facility] M[facility] US 2 YOHANA [facility]   5/16/2022  3:30 PM OSMIN COTA [facility]   5/23/2022  3:00 PM [facility] M[facility] US 2 ALLIEMU [facility]   5/23/2022  3:30 PM SJCEDRIC MANZANARESCELINE [facility]   5/24/2022  2:00 PM Victorino White MD ICFMOB [facility]   5/31/2022  3:00 PM [facility] M[facility] US 2 LEIGHTONJOSELINE [facility]   5/31/2022  3:30 [facility]   6/7/2022  1:30 PM [facility]

## 2022-05-06 ENCOUNTER — OFFICE VISIT (OUTPATIENT)
Dept: ENDOCRINOLOGY | Facility: CLINIC | Age: 27
End: 2022-05-06
Payer: COMMERCIAL

## 2022-05-06 VITALS
SYSTOLIC BLOOD PRESSURE: 128 MMHG | BODY MASS INDEX: 24.78 KG/M2 | HEART RATE: 120 BPM | WEIGHT: 114.5 LBS | DIASTOLIC BLOOD PRESSURE: 74 MMHG

## 2022-05-06 DIAGNOSIS — E05.90 HYPERTHYROIDISM: ICD-10-CM

## 2022-05-06 DIAGNOSIS — E05.00 GRAVES DISEASE: ICD-10-CM

## 2022-05-06 PROCEDURE — 99214 OFFICE O/P EST MOD 30 MIN: CPT | Performed by: INTERNAL MEDICINE

## 2022-05-06 RX ORDER — PROPRANOLOL HYDROCHLORIDE 160 MG/1
160 CAPSULE, EXTENDED RELEASE ORAL DAILY
Qty: 30 CAPSULE | Refills: 3 | Status: SHIPPED | OUTPATIENT
Start: 2022-05-06 | End: 2022-07-06

## 2022-05-06 RX ORDER — METHIMAZOLE 10 MG/1
10 TABLET ORAL 2 TIMES DAILY
Qty: 60 TABLET | Refills: 3 | Status: SHIPPED | OUTPATIENT
Start: 2022-05-06 | End: 2022-06-10

## 2022-05-06 NOTE — PATIENT INSTRUCTIONS
-Continue methimazole 10 mg twice daily  -Increase Propranolol to 160 mg daily  -Lab draw in 2 weeks   -Return for a follow-up visit in 6-8 weeks  -We will communicate results via [place], or if needed by phone

## 2022-05-06 NOTE — LETTER
5/6/2022         RE: Yusuf Stubbs  [address]  [place] [address]        Dear Colleague,    Thank you for referring your patient, Yusuf Stubbs, to the [facility]. Please see a copy of my visit note below.      ENDOCRINOLOGY FOLLOW-UP         HISTORY OF PRESENT ILLNESS    Yusuf Stubbs is seen in follow-up.     The patient is a 33 weeks and 3 days of gestation.    Saw Dr. White on 5/4/2022: I had expected patient was taking methimazole 10 mg twice daily based on the dose adjustments we made previously, but Dr. White discovered she was taking 5 mg twice daily.  Therefore, he recommended adjusting to 10 mg twice daily until her follow-up today.    Delivery is planned around 37 weeks given uncontrolled hyperthyroidism.    Patient notes she takes 1 tablet twice daily of methimazole: Unsure if she got 5 mg or 10 mg tablet from her pharmacy but notes she filled her prescription earlier this week on 5/4/2022.  Our clinic staff called pharmacy: They note that methimazole 5 mg tablets were filled, but last fill was in January 2022.    Patient also confirms she is taking propranolol  mg daily.    She continues to report marked fatigue and heat intolerance.  Has occasional palpitations.  No tremors.    Baby is moving consistently.    She was noting neck fullness at last visit: She continues to note this, although it is not changing/worsening.    Still has rash on medial elbow and behind the knees, although much improved and not itchy any longer.    She had COVID-19 infection recently: Had headache, vomiting and fatigue the first 2 days.  Symptoms abated thereafter.  Has noted more itchiness of her eyes since COVID infection.  No diplopia.    Pertinent endocrine and related history:  1.  Hypothyroidism, due to Graves' disease.  Was initially diagnosed with hyperthyroidism secondary to Graves Disease in 2016. She had been pregnant at the time. She was started on methimazole 15 mg daily. At  the time of initial presentation she had been experiencing weight loss of 20 pounds, palpitations, heat intolerance and anxiety.   -She followed with endocrinology and was treated through another pregnancy in 2017.  She recalls that both of the children she had with hyperthyroidism during pregnancy ultimately needed treatment with methimazole in infancy.    -Radioactive iodine ablation was contemplated (initially deferred due to concern for worsening of thyroid eye disease) and ultimately pursued due to patient preference: Radioactive iodine uptake scan revealed an 80% uptake. She underwent radioactive iodine ablation on 2019 and received 14.6 mCi.   -She was last seen by Endocrinology at [facility] in 2019 by Dr. Sharma at which time the patient was prescribed methimazole (the medication was resumed due to persistent hyperthyroidism after radioiodine therapy).  2.  History of thyroid related eye disease.  3.  Pregnancy.  During this pregnancy, she was initially started on PTU 50 mg daily in 2021 (prescribed 2021) then transitioned to methimazole 5 mg TID on 22 at 16 weeks gestation.     PAST MEDICAL HISTORY  Past Medical History:   Diagnosis Date     Endocrine disease      Hypertension      Hyperthyroidism 2016     Precipitous delivery, delivered (current hospitalization) 2016      delivery 2016     Unspecified fetal growth retardation, unspecified (weight)      Urinary tract infection        MEDICATIONS  Current Outpatient Medications   Medication Sig Dispense Refill     acetaminophen (TYLENOL) 500 MG tablet Take 500-1,000 mg by mouth every 6 hours as needed for mild pain       aspirin (ASA) 81 MG EC tablet Take 1 tablet (81 mg) by mouth daily 100 tablet 2     methimazole (TAPAZOLE) 10 MG tablet Take 1 tablet (10 mg) by mouth 3 times daily 90 tablet 3     Prenatal Vit-Fe Fumarate-FA (PRENATAL MULTIVITAMIN W/IRON) 27-0.8 MG tablet Take 1 tablet by mouth daily 90 tablet 3      propranolol ER (INDERAL LA) 120 MG 24 hr capsule Take 1 capsule (120 mg) by mouth daily 30 capsule 5       Allergies, family, and social history were reviewed and documented as needed in EHR.     REVIEW OF SYSTEMS  A focused ROS was performed, with pertinent positives and negatives as noted in the HPI.    PHYSICAL EXAM  /74 (BP Location: Right arm, Patient Position: Sitting, Cuff Size: Adult Regular)   Pulse 120   Wt 51.9 kg (114 lb 8 oz)   LMP 09/14/2021 (Exact Date)   BMI 24.78 kg/m    Body mass index is 24.78 kg/m .  Constitutional: Vital signs reviewed, as recorded above. Patient is alert, oriented and appears in no acute distress.  Eyes: Bilateral stare without lid lag or retraction, no conjunctival injection.    Neck: Neck supple, thyroid is at least 2 times the size of normal, without palpable nodules.  Some tenderness on exam.  Cardiovascular: Tachycardic, regular rhythm, normal S1/S2, trace extremity edema.  Respiratory: CTAB, without wheezes, crackles or rhonchi; normal chest wall motion and respiratory effort.  GI: Gravid.  MSK: No clubbing or cyanosis; normal muscle bulk and tone.  Skin: Normal skin color, temperature, turgor and texture.  Neurological: Alert and oriented times 3. No tremor.    DATA REVIEW  Each of the following laboratory and/or imaging studies were reviewed.    Admission on 04/26/2022, Discharged on 04/26/2022   Component Date Value Ref Range Status     WBC Count 04/26/2022 7.8  4.0 - 11.0 10e3/uL Final     RBC Count 04/26/2022 4.51  3.80 - 5.20 10e6/uL Final     Hemoglobin 04/26/2022 11.9  11.7 - 15.7 g/dL Final     Hematocrit 04/26/2022 36.4  35.0 - 47.0 % Final     MCV 04/26/2022 81  78 - 100 fL Final     MCH 04/26/2022 26.4 (A) 26.5 - 33.0 pg Final     MCHC 04/26/2022 32.7  31.5 - 36.5 g/dL Final     RDW 04/26/2022 11.9  10.0 - 15.0 % Final     Platelet Count 04/26/2022 200  150 - 450 10e3/uL Final     AST 04/26/2022 13  0 - 40 U/L Final     ALT 04/26/2022 <9  0 - 45  U/L Final     Creatinine 04/26/2022 0.44 (A) 0.60 - 1.10 mg/dL Final     GFR Estimate 04/26/2022 >90  >60 mL/min/1.73m2 Final    Effective December 21, 2021 eGFRcr in adults is calculated using the 2021 CKD-EPI creatinine equation which includes age and gender (aJckie et al., NE, DOI: 10.1056/UTRWlb2811529)     Total Protein Urine mg/dL 04/26/2022 30.6  mg/dL Final     Total Protein UR MG/MG CR 04/26/2022 0.23  mg/mg Cr Final     Creatinine Urine mg/dL 04/26/2022 135  mg/dL Final     ABO/RH(D) 04/26/2022 B POS   Final     Antibody Screen 04/26/2022 Negative  Negative Final     SPECIMEN EXPIRATION DATE 04/26/2022 [id number]   Final     Hold Specimen 04/26/2022 [facility]   Final     T4 Total 04/26/2022 23.6 (A) 4.5 - 13.0 ug/dL Final     T3 Total 04/26/2022 393 (A) 60 - 181 ng/dL Final         ASSESSMENT  1.  Hyperthyroidism.  Due to Graves' disease.  Remains thyrotoxic.  Unclear if there has been some confusion about methimazole dosing or if patient may not be taking methimazole as regularly as she perceives: Pharmacy reports methimazole was last filled in January 2022 at 5 mg doses, all patient notes that she just picked up a prescription earlier this week.  Have sent updated prescription for methimazole 10 mg twice daily since it appears Ms. Stubbs has been taking 5 mg twice daily so far.  She remains tachycardic and has intermittent palpitations so we will adjust propranolol dose upward (reports taking this consistently).  Has follow-up with Dr. White on 5/11/22 so we can see how she responds to adjustment in beta-blocker.  Recheck thyroid function tests in 2 weeks (check Graves' serology also).  Follow-up with me in 6 to 8 weeks.  We discussed the possibility of worsening hyperthyroidism after delivery and importance of follow-up.    2.  Graves' disease.  Some nighttime visual symptoms with glare.  Also has noted more itching of her eyes after recent COVID infection.  Has follow-up in ophthalmology: We discussed  contacting them to be put on wait list if possible.    PLAN  -Continue methimazole 10 mg twice daily  -Increase Propranolol to 160 mg daily  -Lab draw in 2 weeks   -Return for a follow-up visit in 6-8 weeks  -We will communicate results via [employer], or if needed by phone      Orders Placed This Encounter   Procedures     T3 total     Thyroxine total     Hepatic function panel     I spent a total of 38 minutes on the date of encounter reviewing medical records, evaluating the patient, coordinating care and documenting in the EHR, as detailed above.      Cortez Echols MD   Division of Diabetes, Endocrinology and Metabolism  Department of Medicine            Again, thank you for allowing me to participate in the care of your patient.        Sincerely,        CORTEZ Echols MD

## 2022-05-06 NOTE — PROGRESS NOTES
ENDOCRINOLOGY FOLLOW-UP         HISTORY OF PRESENT ILLNESS    Yusuf Stubbs is seen in follow-up.     The patient is a 33 weeks and 3 days of gestation.    Saw Dr. White on 5/4/2022: I had expected patient was taking methimazole 10 mg twice daily based on the dose adjustments we made previously, but Dr. White discovered she was taking 5 mg twice daily.  Therefore, he recommended adjusting to 10 mg twice daily until her follow-up today.    Delivery is planned around 37 weeks given uncontrolled hyperthyroidism.    Patient notes she takes 1 tablet twice daily of methimazole: Unsure if she got 5 mg or 10 mg tablet from her pharmacy but notes she filled her prescription earlier this week on 5/4/2022.  Our clinic staff called pharmacy: They note that methimazole 5 mg tablets were filled, but last fill was in January 2022.    Patient also confirms she is taking propranolol  mg daily.    She continues to report marked fatigue and heat intolerance.  Has occasional palpitations.  No tremors.    Baby is moving consistently.    She was noting neck fullness at last visit: She continues to note this, although it is not changing/worsening.    Still has rash on medial elbow and behind the knees, although much improved and not itchy any longer.    She had COVID-19 infection recently: Had headache, vomiting and fatigue the first 2 days.  Symptoms abated thereafter.  Has noted more itchiness of her eyes since COVID infection.  No diplopia.    Pertinent endocrine and related history:  1.  Hypothyroidism, due to Graves' disease.  Was initially diagnosed with hyperthyroidism secondary to Graves Disease in 2016. She had been pregnant at the time. She was started on methimazole 15 mg daily. At the time of initial presentation she had been experiencing weight loss of 20 pounds, palpitations, heat intolerance and anxiety.   -She followed with endocrinology and was treated through another pregnancy in 2017.  She recalls that both of  the children she had with hyperthyroidism during pregnancy ultimately needed treatment with methimazole in infancy.    -Radioactive iodine ablation was contemplated (initially deferred due to concern for worsening of thyroid eye disease) and ultimately pursued due to patient preference: Radioactive iodine uptake scan revealed an 80% uptake. She underwent radioactive iodine ablation on 2019 and received 14.6 mCi.   -She was last seen by Endocrinology at [facility] in 2019 by Dr. Sharma at which time the patient was prescribed methimazole (the medication was resumed due to persistent hyperthyroidism after radioiodine therapy).  2.  History of thyroid related eye disease.  3.  Pregnancy.  During this pregnancy, she was initially started on PTU 50 mg daily in 2021 (prescribed 2021) then transitioned to methimazole 5 mg TID on 22 at 16 weeks gestation.     PAST MEDICAL HISTORY  Past Medical History:   Diagnosis Date     Endocrine disease      Hypertension      Hyperthyroidism 2016     Precipitous delivery, delivered (current hospitalization) 2016      delivery 2016     Unspecified fetal growth retardation, unspecified (weight)      Urinary tract infection        MEDICATIONS  Current Outpatient Medications   Medication Sig Dispense Refill     acetaminophen (TYLENOL) 500 MG tablet Take 500-1,000 mg by mouth every 6 hours as needed for mild pain       aspirin (ASA) 81 MG EC tablet Take 1 tablet (81 mg) by mouth daily 100 tablet 2     methimazole (TAPAZOLE) 10 MG tablet Take 1 tablet (10 mg) by mouth 3 times daily 90 tablet 3     Prenatal Vit-Fe Fumarate-FA (PRENATAL MULTIVITAMIN W/IRON) 27-0.8 MG tablet Take 1 tablet by mouth daily 90 tablet 3     propranolol ER (INDERAL LA) 120 MG 24 hr capsule Take 1 capsule (120 mg) by mouth daily 30 capsule 5       Allergies, family, and social history were reviewed and documented as needed in EHR.     REVIEW OF SYSTEMS  A focused ROS was  performed, with pertinent positives and negatives as noted in the HPI.    PHYSICAL EXAM  /74 (BP Location: Right arm, Patient Position: Sitting, Cuff Size: Adult Regular)   Pulse 120   Wt 51.9 kg (114 lb 8 oz)   LMP 09/14/2021 (Exact Date)   BMI 24.78 kg/m    Body mass index is 24.78 kg/m .  Constitutional: Vital signs reviewed, as recorded above. Patient is alert, oriented and appears in no acute distress.  Eyes: Bilateral stare without lid lag or retraction, no conjunctival injection.    Neck: Neck supple, thyroid is at least 2 times the size of normal, without palpable nodules.  Some tenderness on exam.  Cardiovascular: Tachycardic, regular rhythm, normal S1/S2, trace extremity edema.  Respiratory: CTAB, without wheezes, crackles or rhonchi; normal chest wall motion and respiratory effort.  GI: Gravid.  MSK: No clubbing or cyanosis; normal muscle bulk and tone.  Skin: Normal skin color, temperature, turgor and texture.  Neurological: Alert and oriented times 3. No tremor.    DATA REVIEW  Each of the following laboratory and/or imaging studies were reviewed.    Admission on 04/26/2022, Discharged on 04/26/2022   Component Date Value Ref Range Status     WBC Count 04/26/2022 7.8  4.0 - 11.0 10e3/uL Final     RBC Count 04/26/2022 4.51  3.80 - 5.20 10e6/uL Final     Hemoglobin 04/26/2022 11.9  11.7 - 15.7 g/dL Final     Hematocrit 04/26/2022 36.4  35.0 - 47.0 % Final     MCV 04/26/2022 81  78 - 100 fL Final     MCH 04/26/2022 26.4 (A) 26.5 - 33.0 pg Final     MCHC 04/26/2022 32.7  31.5 - 36.5 g/dL Final     RDW 04/26/2022 11.9  10.0 - 15.0 % Final     Platelet Count 04/26/2022 200  150 - 450 10e3/uL Final     AST 04/26/2022 13  0 - 40 U/L Final     ALT 04/26/2022 <9  0 - 45 U/L Final     Creatinine 04/26/2022 0.44 (A) 0.60 - 1.10 mg/dL Final     GFR Estimate 04/26/2022 >90  >60 mL/min/1.73m2 Final    Effective December 21, 2021 eGFRcr in adults is calculated using the 2021 CKD-EPI creatinine equation which  includes age and gender (Jackie cortez al., [place], DOI: 10.1056/NNYLkk3437638)     Total Protein Urine mg/dL 04/26/2022 30.6  mg/dL Final     Total Protein UR MG/MG CR 04/26/2022 0.23  mg/mg Cr Final     Creatinine Urine mg/dL 04/26/2022 135  mg/dL Final     ABO/RH(D) 04/26/2022 B POS   Final     Antibody Screen 04/26/2022 Negative  Negative Final     SPECIMEN EXPIRATION DATE 04/26/2022 [id number]   Final     Hold Specimen 04/26/2022 [facility]   Final     T4 Total 04/26/2022 23.6 (A) 4.5 - 13.0 ug/dL Final     T3 Total 04/26/2022 393 (A) 60 - 181 ng/dL Final         ASSESSMENT  1.  Hyperthyroidism.  Due to Graves' disease.  Remains thyrotoxic.  Unclear if there has been some confusion about methimazole dosing or if patient may not be taking methimazole as regularly as she perceives: Pharmacy reports methimazole was last filled in January 2022 at 5 mg doses, all patient notes that she just picked up a prescription earlier this week.  Have sent updated prescription for methimazole 10 mg twice daily since it appears Ms. Stubbs has been taking 5 mg twice daily so far.  She remains tachycardic and has intermittent palpitations so we will adjust propranolol dose upward (reports taking this consistently).  Has follow-up with Dr. White on 5/11/22 so we can see how she responds to adjustment in beta-blocker.  Recheck thyroid function tests in 2 weeks (check Graves' serology also).  Follow-up with me in 6 to 8 weeks.  We discussed the possibility of worsening hyperthyroidism after delivery and importance of follow-up.    2.  Graves' disease.  Some nighttime visual symptoms with glare.  Also has noted more itching of her eyes after recent COVID infection.  Has follow-up in ophthalmology: We discussed contacting them to be put on wait list if possible.    PLAN  -Continue methimazole 10 mg twice daily  -Increase Propranolol to 160 mg daily  -Lab draw in 2 weeks   -Return for a follow-up visit in 6-8 weeks  -We will communicate results via  [employer], or if needed by phone      Orders Placed This Encounter   Procedures     T3 total     Thyroxine total     Hepatic function panel     I spent a total of 38 minutes on the date of encounter reviewing medical records, evaluating the patient, coordinating care and documenting in the EHR, as detailed above.      Matthew Echols MD   Division of Diabetes, Endocrinology and Metabolism  Department of Medicine

## 2022-05-09 ENCOUNTER — OFFICE VISIT (OUTPATIENT)
Dept: MATERNAL FETAL MEDICINE | Facility: HOSPITAL | Age: 27
End: 2022-05-09
Attending: FAMILY MEDICINE
Payer: COMMERCIAL

## 2022-05-09 ENCOUNTER — ANCILLARY PROCEDURE (OUTPATIENT)
Dept: ULTRASOUND IMAGING | Facility: HOSPITAL | Age: 27
End: 2022-05-09
Attending: FAMILY MEDICINE
Payer: COMMERCIAL

## 2022-05-09 DIAGNOSIS — O16.9 HYPERTENSION IN PREGNANCY: ICD-10-CM

## 2022-05-09 DIAGNOSIS — O10.913 PRE-EXISTING HYPERTENSION DURING PREGNANCY IN THIRD TRIMESTER, UNSPECIFIED PRE-EXISTING HYPERTENSION TYPE: Primary | ICD-10-CM

## 2022-05-09 DIAGNOSIS — E05.00 GRAVES DISEASE: ICD-10-CM

## 2022-05-09 PROCEDURE — 76816 OB US FOLLOW-UP PER FETUS: CPT | Mod: 26 | Performed by: OBSTETRICS & GYNECOLOGY

## 2022-05-09 PROCEDURE — 76819 FETAL BIOPHYS PROFIL W/O NST: CPT

## 2022-05-09 PROCEDURE — 76819 FETAL BIOPHYS PROFIL W/O NST: CPT | Mod: 26 | Performed by: OBSTETRICS & GYNECOLOGY

## 2022-05-09 PROCEDURE — 99207 PR NO CHARGE LOS: CPT | Performed by: OBSTETRICS & GYNECOLOGY

## 2022-05-09 PROCEDURE — 76816 OB US FOLLOW-UP PER FETUS: CPT

## 2022-05-09 NOTE — PROGRESS NOTES
"Please see \"Imaging\" tab under Chart Review for full details.    Judith Lopez MD  Maternal Fetal Medicine    "

## 2022-05-11 ENCOUNTER — PRENATAL OFFICE VISIT (OUTPATIENT)
Dept: FAMILY MEDICINE | Facility: CLINIC | Age: 27
End: 2022-05-11
Payer: COMMERCIAL

## 2022-05-11 VITALS
SYSTOLIC BLOOD PRESSURE: 123 MMHG | DIASTOLIC BLOOD PRESSURE: 71 MMHG | WEIGHT: 115 LBS | RESPIRATION RATE: 22 BRPM | BODY MASS INDEX: 24.89 KG/M2 | HEART RATE: 72 BPM

## 2022-05-11 DIAGNOSIS — Z34.83 ENCOUNTER FOR SUPERVISION OF OTHER NORMAL PREGNANCY IN THIRD TRIMESTER: Primary | ICD-10-CM

## 2022-05-11 DIAGNOSIS — O99.282 HYPERTHYROIDISM AFFECTING PREGNANCY IN SECOND TRIMESTER: ICD-10-CM

## 2022-05-11 DIAGNOSIS — E05.90 HYPERTHYROIDISM AFFECTING PREGNANCY IN SECOND TRIMESTER: ICD-10-CM

## 2022-05-11 DIAGNOSIS — E05.90 HYPERTHYROIDISM: ICD-10-CM

## 2022-05-11 LAB
ALBUMIN UR-MCNC: ABNORMAL MG/DL
GLUCOSE UR STRIP-MCNC: NEGATIVE MG/DL
KETONES UR STRIP-MCNC: NEGATIVE MG/DL

## 2022-05-11 PROCEDURE — 90715 TDAP VACCINE 7 YRS/> IM: CPT | Performed by: FAMILY MEDICINE

## 2022-05-11 PROCEDURE — 99207 PR COMPLICATED OB VISIT: CPT | Performed by: FAMILY MEDICINE

## 2022-05-11 PROCEDURE — 90471 IMMUNIZATION ADMIN: CPT | Performed by: FAMILY MEDICINE

## 2022-05-11 PROCEDURE — 81003 URINALYSIS AUTO W/O SCOPE: CPT | Performed by: FAMILY MEDICINE

## 2022-05-11 NOTE — PROGRESS NOTES
Subjective:  26 year old  at 33w1d  [place] ctx that at night that go away with rest and sleep. No lof, bleeding, or dc.  No HA or vision changes.  Saw endocrine on 22. She has been taking methimazole 10mg BID as recommended and picked up her new dose of propanolol 160mg.   Overall feels well but tired. COVID sx resolved.    Outpatient Medications Prior to Visit   Medication Sig Dispense Refill     acetaminophen (TYLENOL) 500 MG tablet Take 500-1,000 mg by mouth every 6 hours as needed for mild pain       aspirin (ASA) 81 MG EC tablet Take 1 tablet (81 mg) by mouth daily 100 tablet 2     methimazole (TAPAZOLE) 10 MG tablet Take 1 tablet (10 mg) by mouth 2 times daily 60 tablet 3     Prenatal Vit-Fe Fumarate-FA (PRENATAL MULTIVITAMIN W/IRON) 27-0.8 MG tablet Take 1 tablet by mouth daily 90 tablet 3     propranolol ER (INDERAL LA) 160 MG 24 hr capsule Take 1 capsule (160 mg) by mouth daily 30 capsule 3     No facility-administered medications prior to visit.      History   Smoking Status     Former Smoker     Types: Cigarettes   Smokeless Tobacco     Never Used      Objective:  /71 (BP Location: Left arm, Patient Position: Sitting, Cuff Size: Adult Regular)   Pulse 72   Resp 22   Wt 52.2 kg (115 lb)   LMP 2021 (Exact Date)   BMI 24.89 kg/m    GENERAL: alert, not distressed  CHEST: clear, no rales, rhonchi, or wheezes  CARDIAC: regular without murmur, gallop, or rub  ABDOMEN: gravid, soft, non tender, non distended    Recent Results (from the past 24 hour(s))   Urinalysis, OB Screen    Collection Time: 22  9:27 AM   Result Value Ref Range    Glucose Urine Negative Negative mg/dL    Ketones Urine Negative Negative mg/dL    Protein Albumin Urine Trace (A) Negative mg/dL      Assessment and Plan:   1. Encounter for supervision of normal pregnancy in third trimester  FHR WNL for this visit. Will have labs ordered by endocrine done in 2 weeks and will follow up in 6-8 weeks. She has  an US and OB visit scheduled for next week. Patient received TDAP vaccine today.   - Urinalysis, OB Screen       No follow-ups on file.   Future Appointments   Date Time Provider Department Center   5/16/2022  3:00 PM [facility] MFM US 2 JNMFMU [facility]   5/16/2022  3:30 [facility]   5/20/2022 10:45 AM [facility] LAB JAREK [facility]   5/23/2022  3:00 PM [facility] MFM US 2 JNMFMU [facility]   5/23/2022  3:30 PM [facility]   5/24/2022  2:00 PM Victorino White MD ICFMOB [facility]   5/31/2022  3:00 PM [facility] MFM US 2 JNMFMU [facility]   5/31/2022  3:30 PM [facility]   6/7/2022  1:30 PM Stanton Ybarra MD URPEG P [facility]IN   6/10/2022 10:30 AM Matthew Echols MD MDENDO [facility]   6/15/2022 10:20 AM Victorino White MD ICFMOB [facility]   6/22/2022  9:00 AM Victorino White MD [facility]CFMOB [facility]   6/29/2022  9:00 AM Victorino White MD ICFMOB [facility]      Arelis Jennings, Student NP

## 2022-05-16 ENCOUNTER — OFFICE VISIT (OUTPATIENT)
Dept: MATERNAL FETAL MEDICINE | Facility: HOSPITAL | Age: 27
End: 2022-05-16
Attending: FAMILY MEDICINE
Payer: COMMERCIAL

## 2022-05-16 ENCOUNTER — ANCILLARY PROCEDURE (OUTPATIENT)
Dept: ULTRASOUND IMAGING | Facility: HOSPITAL | Age: 27
End: 2022-05-16
Attending: FAMILY MEDICINE
Payer: COMMERCIAL

## 2022-05-16 DIAGNOSIS — O16.9 HYPERTENSION IN PREGNANCY: ICD-10-CM

## 2022-05-16 DIAGNOSIS — O10.919 CHRONIC HYPERTENSION IN PREGNANCY: ICD-10-CM

## 2022-05-16 DIAGNOSIS — E05.90 HYPERTHYROIDISM AFFECTING PREGNANCY IN THIRD TRIMESTER: Primary | ICD-10-CM

## 2022-05-16 DIAGNOSIS — O99.283 HYPERTHYROIDISM AFFECTING PREGNANCY IN THIRD TRIMESTER: Primary | ICD-10-CM

## 2022-05-16 DIAGNOSIS — E05.00 GRAVES DISEASE: ICD-10-CM

## 2022-05-16 PROCEDURE — 76819 FETAL BIOPHYS PROFIL W/O NST: CPT

## 2022-05-16 PROCEDURE — 99207 PR NO CHARGE LOS: CPT | Performed by: OBSTETRICS & GYNECOLOGY

## 2022-05-16 PROCEDURE — 76819 FETAL BIOPHYS PROFIL W/O NST: CPT | Mod: 26 | Performed by: OBSTETRICS & GYNECOLOGY

## 2022-05-16 NOTE — PROGRESS NOTES
"Please see \"Imaging\" tab under \"Chart Review\" for details of today's visit.    Marion Figueroa    "

## 2022-05-24 ENCOUNTER — HOSPITAL ENCOUNTER (INPATIENT)
Facility: HOSPITAL | Age: 27
LOS: 2 days | Discharge: HOME HEALTH | End: 2022-05-26
Attending: FAMILY MEDICINE | Admitting: FAMILY MEDICINE
Payer: COMMERCIAL

## 2022-05-24 PROBLEM — Z34.90 PREGNANT: Status: ACTIVE | Noted: 2022-05-24

## 2022-05-24 PROBLEM — Z36.89 ENCOUNTER FOR TRIAGE IN PREGNANT PATIENT: Status: ACTIVE | Noted: 2022-05-24

## 2022-05-24 LAB
ABO/RH(D): NORMAL
ALT SERPL W P-5'-P-CCNC: 11 U/L (ref 0–45)
ANTIBODY SCREEN: NEGATIVE
AST SERPL W P-5'-P-CCNC: 17 U/L (ref 0–40)
CREAT SERPL-MCNC: 0.42 MG/DL (ref 0.6–1.1)
ERYTHROCYTE [DISTWIDTH] IN BLOOD BY AUTOMATED COUNT: 12.8 % (ref 10–15)
GFR SERPL CREATININE-BSD FRML MDRD: >90 ML/MIN/1.73M2
HCT VFR BLD AUTO: 38 % (ref 35–47)
HGB BLD-MCNC: 12.2 G/DL (ref 11.7–15.7)
MCH RBC QN AUTO: 25.9 PG (ref 26.5–33)
MCHC RBC AUTO-ENTMCNC: 32.1 G/DL (ref 31.5–36.5)
MCV RBC AUTO: 81 FL (ref 78–100)
PLATELET # BLD AUTO: 175 10E3/UL (ref 150–450)
RBC # BLD AUTO: 4.71 10E6/UL (ref 3.8–5.2)
SARS-COV-2 RNA RESP QL NAA+PROBE: NEGATIVE
SPECIMEN EXPIRATION DATE: NORMAL
T PALLIDUM AB SER QL: NONREACTIVE
WBC # BLD AUTO: 11 10E3/UL (ref 4–11)

## 2022-05-24 PROCEDURE — 85027 COMPLETE CBC AUTOMATED: CPT | Performed by: FAMILY MEDICINE

## 2022-05-24 PROCEDURE — 88307 TISSUE EXAM BY PATHOLOGIST: CPT | Mod: TC | Performed by: FAMILY MEDICINE

## 2022-05-24 PROCEDURE — 84450 TRANSFERASE (AST) (SGOT): CPT | Performed by: FAMILY MEDICINE

## 2022-05-24 PROCEDURE — 59400 OBSTETRICAL CARE: CPT | Performed by: FAMILY MEDICINE

## 2022-05-24 PROCEDURE — 86850 RBC ANTIBODY SCREEN: CPT | Performed by: FAMILY MEDICINE

## 2022-05-24 PROCEDURE — 36415 COLL VENOUS BLD VENIPUNCTURE: CPT | Performed by: FAMILY MEDICINE

## 2022-05-24 PROCEDURE — 120N000001 HC R&B MED SURG/OB

## 2022-05-24 PROCEDURE — 722N000001 HC LABOR CARE VAGINAL DELIVERY SINGLE

## 2022-05-24 PROCEDURE — 87635 SARS-COV-2 COVID-19 AMP PRB: CPT | Performed by: FAMILY MEDICINE

## 2022-05-24 PROCEDURE — 88307 TISSUE EXAM BY PATHOLOGIST: CPT | Mod: 26 | Performed by: PATHOLOGY

## 2022-05-24 PROCEDURE — 250N000013 HC RX MED GY IP 250 OP 250 PS 637: Performed by: FAMILY MEDICINE

## 2022-05-24 PROCEDURE — 82565 ASSAY OF CREATININE: CPT | Performed by: FAMILY MEDICINE

## 2022-05-24 PROCEDURE — 86901 BLOOD TYPING SEROLOGIC RH(D): CPT | Performed by: FAMILY MEDICINE

## 2022-05-24 PROCEDURE — 250N000011 HC RX IP 250 OP 636: Performed by: FAMILY MEDICINE

## 2022-05-24 PROCEDURE — 84460 ALANINE AMINO (ALT) (SGPT): CPT | Performed by: FAMILY MEDICINE

## 2022-05-24 PROCEDURE — 250N000009 HC RX 250: Performed by: FAMILY MEDICINE

## 2022-05-24 PROCEDURE — 258N000003 HC RX IP 258 OP 636: Performed by: FAMILY MEDICINE

## 2022-05-24 PROCEDURE — 86780 TREPONEMA PALLIDUM: CPT | Performed by: FAMILY MEDICINE

## 2022-05-24 RX ORDER — DOCUSATE SODIUM 100 MG/1
100 CAPSULE, LIQUID FILLED ORAL DAILY
Status: DISCONTINUED | OUTPATIENT
Start: 2022-05-24 | End: 2022-05-26 | Stop reason: HOSPADM

## 2022-05-24 RX ORDER — KETOROLAC TROMETHAMINE 30 MG/ML
30 INJECTION, SOLUTION INTRAMUSCULAR; INTRAVENOUS
Status: DISCONTINUED | OUTPATIENT
Start: 2022-05-24 | End: 2022-05-26 | Stop reason: HOSPADM

## 2022-05-24 RX ORDER — LABETALOL HYDROCHLORIDE 5 MG/ML
20 INJECTION, SOLUTION INTRAVENOUS
Status: DISCONTINUED | OUTPATIENT
Start: 2022-05-24 | End: 2022-05-26 | Stop reason: HOSPADM

## 2022-05-24 RX ORDER — NALOXONE HYDROCHLORIDE 0.4 MG/ML
0.2 INJECTION, SOLUTION INTRAMUSCULAR; INTRAVENOUS; SUBCUTANEOUS
Status: DISCONTINUED | OUTPATIENT
Start: 2022-05-24 | End: 2022-05-24 | Stop reason: HOSPADM

## 2022-05-24 RX ORDER — MISOPROSTOL 200 UG/1
800 TABLET ORAL
Status: DISCONTINUED | OUTPATIENT
Start: 2022-05-24 | End: 2022-05-24 | Stop reason: HOSPADM

## 2022-05-24 RX ORDER — MAGNESIUM SULFATE 4 G/50ML
4 INJECTION INTRAVENOUS
Status: DISCONTINUED | OUTPATIENT
Start: 2022-05-24 | End: 2022-05-26 | Stop reason: HOSPADM

## 2022-05-24 RX ORDER — CARBOPROST TROMETHAMINE 250 UG/ML
250 INJECTION, SOLUTION INTRAMUSCULAR
Status: DISCONTINUED | OUTPATIENT
Start: 2022-05-24 | End: 2022-05-24 | Stop reason: HOSPADM

## 2022-05-24 RX ORDER — MISOPROSTOL 200 UG/1
800 TABLET ORAL
Status: DISCONTINUED | OUTPATIENT
Start: 2022-05-24 | End: 2022-05-26 | Stop reason: HOSPADM

## 2022-05-24 RX ORDER — MISOPROSTOL 200 UG/1
400 TABLET ORAL
Status: DISCONTINUED | OUTPATIENT
Start: 2022-05-24 | End: 2022-05-24 | Stop reason: HOSPADM

## 2022-05-24 RX ORDER — METOCLOPRAMIDE HYDROCHLORIDE 5 MG/ML
10 INJECTION INTRAMUSCULAR; INTRAVENOUS EVERY 6 HOURS PRN
Status: DISCONTINUED | OUTPATIENT
Start: 2022-05-24 | End: 2022-05-24 | Stop reason: HOSPADM

## 2022-05-24 RX ORDER — LIDOCAINE HYDROCHLORIDE 10 MG/ML
INJECTION, SOLUTION EPIDURAL; INFILTRATION; INTRACAUDAL; PERINEURAL
Status: DISCONTINUED
Start: 2022-05-24 | End: 2022-05-24 | Stop reason: WASHOUT

## 2022-05-24 RX ORDER — ONDANSETRON 2 MG/ML
4 INJECTION INTRAMUSCULAR; INTRAVENOUS EVERY 6 HOURS PRN
Status: DISCONTINUED | OUTPATIENT
Start: 2022-05-24 | End: 2022-05-24 | Stop reason: HOSPADM

## 2022-05-24 RX ORDER — NIFEDIPINE 10 MG/1
10-20 CAPSULE ORAL
Status: DISCONTINUED | OUTPATIENT
Start: 2022-05-24 | End: 2022-05-26 | Stop reason: HOSPADM

## 2022-05-24 RX ORDER — IBUPROFEN 800 MG/1
800 TABLET, FILM COATED ORAL EVERY 6 HOURS PRN
Status: DISCONTINUED | OUTPATIENT
Start: 2022-05-24 | End: 2022-05-26 | Stop reason: HOSPADM

## 2022-05-24 RX ORDER — METHYLERGONOVINE MALEATE 0.2 MG/ML
200 INJECTION INTRAVENOUS
Status: DISCONTINUED | OUTPATIENT
Start: 2022-05-24 | End: 2022-05-26 | Stop reason: HOSPADM

## 2022-05-24 RX ORDER — OXYTOCIN/0.9 % SODIUM CHLORIDE 30/500 ML
100-340 PLASTIC BAG, INJECTION (ML) INTRAVENOUS CONTINUOUS PRN
Status: DISCONTINUED | OUTPATIENT
Start: 2022-05-24 | End: 2022-05-26 | Stop reason: HOSPADM

## 2022-05-24 RX ORDER — PROCHLORPERAZINE MALEATE 10 MG
10 TABLET ORAL EVERY 6 HOURS PRN
Status: DISCONTINUED | OUTPATIENT
Start: 2022-05-24 | End: 2022-05-24 | Stop reason: HOSPADM

## 2022-05-24 RX ORDER — LORAZEPAM 2 MG/ML
2 INJECTION INTRAMUSCULAR
Status: DISCONTINUED | OUTPATIENT
Start: 2022-05-24 | End: 2022-05-26 | Stop reason: HOSPADM

## 2022-05-24 RX ORDER — PENICILLIN G 3000000 [IU]/50ML
3 INJECTION, SOLUTION INTRAVENOUS EVERY 4 HOURS
Status: DISCONTINUED | OUTPATIENT
Start: 2022-05-24 | End: 2022-05-24 | Stop reason: HOSPADM

## 2022-05-24 RX ORDER — MISOPROSTOL 200 UG/1
400 TABLET ORAL
Status: DISCONTINUED | OUTPATIENT
Start: 2022-05-24 | End: 2022-05-26 | Stop reason: HOSPADM

## 2022-05-24 RX ORDER — ACETAMINOPHEN 325 MG/1
650 TABLET ORAL EVERY 4 HOURS PRN
Status: DISCONTINUED | OUTPATIENT
Start: 2022-05-24 | End: 2022-05-26 | Stop reason: HOSPADM

## 2022-05-24 RX ORDER — MAGNESIUM SULFATE IN WATER 40 MG/ML
2 INJECTION, SOLUTION INTRAVENOUS CONTINUOUS
Status: DISCONTINUED | OUTPATIENT
Start: 2022-05-24 | End: 2022-05-26 | Stop reason: HOSPADM

## 2022-05-24 RX ORDER — MODIFIED LANOLIN
OINTMENT (GRAM) TOPICAL
Status: DISCONTINUED | OUTPATIENT
Start: 2022-05-24 | End: 2022-05-26 | Stop reason: HOSPADM

## 2022-05-24 RX ORDER — CALCIUM GLUCONATE 94 MG/ML
1 INJECTION, SOLUTION INTRAVENOUS
Status: DISCONTINUED | OUTPATIENT
Start: 2022-05-24 | End: 2022-05-26 | Stop reason: HOSPADM

## 2022-05-24 RX ORDER — NALOXONE HYDROCHLORIDE 0.4 MG/ML
0.4 INJECTION, SOLUTION INTRAMUSCULAR; INTRAVENOUS; SUBCUTANEOUS
Status: DISCONTINUED | OUTPATIENT
Start: 2022-05-24 | End: 2022-05-24 | Stop reason: HOSPADM

## 2022-05-24 RX ORDER — PROCHLORPERAZINE 25 MG
25 SUPPOSITORY, RECTAL RECTAL EVERY 12 HOURS PRN
Status: DISCONTINUED | OUTPATIENT
Start: 2022-05-24 | End: 2022-05-24 | Stop reason: HOSPADM

## 2022-05-24 RX ORDER — MAGNESIUM SULFATE HEPTAHYDRATE 500 MG/ML
10 INJECTION, SOLUTION INTRAMUSCULAR; INTRAVENOUS
Status: DISCONTINUED | OUTPATIENT
Start: 2022-05-24 | End: 2022-05-26 | Stop reason: HOSPADM

## 2022-05-24 RX ORDER — SODIUM CHLORIDE, SODIUM LACTATE, POTASSIUM CHLORIDE, CALCIUM CHLORIDE 600; 310; 30; 20 MG/100ML; MG/100ML; MG/100ML; MG/100ML
10-125 INJECTION, SOLUTION INTRAVENOUS CONTINUOUS
Status: DISCONTINUED | OUTPATIENT
Start: 2022-05-24 | End: 2022-05-26 | Stop reason: HOSPADM

## 2022-05-24 RX ORDER — OXYTOCIN/0.9 % SODIUM CHLORIDE 30/500 ML
340 PLASTIC BAG, INJECTION (ML) INTRAVENOUS CONTINUOUS PRN
Status: COMPLETED | OUTPATIENT
Start: 2022-05-24 | End: 2022-05-24

## 2022-05-24 RX ORDER — MAGNESIUM SULFATE 4 G/50ML
4 INJECTION INTRAVENOUS ONCE
Status: DISCONTINUED | OUTPATIENT
Start: 2022-05-24 | End: 2022-05-26 | Stop reason: HOSPADM

## 2022-05-24 RX ORDER — PRENATAL VIT/IRON FUM/FOLIC AC 27MG-0.8MG
1 TABLET ORAL DAILY
Status: DISCONTINUED | OUTPATIENT
Start: 2022-05-24 | End: 2022-05-26 | Stop reason: HOSPADM

## 2022-05-24 RX ORDER — BISACODYL 10 MG
10 SUPPOSITORY, RECTAL RECTAL DAILY PRN
Status: DISCONTINUED | OUTPATIENT
Start: 2022-05-24 | End: 2022-05-26 | Stop reason: HOSPADM

## 2022-05-24 RX ORDER — PROPRANOLOL HYDROCHLORIDE 80 MG/1
160 CAPSULE, EXTENDED RELEASE ORAL DAILY
Status: DISCONTINUED | OUTPATIENT
Start: 2022-05-24 | End: 2022-05-26 | Stop reason: HOSPADM

## 2022-05-24 RX ORDER — METOCLOPRAMIDE 10 MG/1
10 TABLET ORAL EVERY 6 HOURS PRN
Status: DISCONTINUED | OUTPATIENT
Start: 2022-05-24 | End: 2022-05-24 | Stop reason: HOSPADM

## 2022-05-24 RX ORDER — PENICILLIN G POTASSIUM 5000000 [IU]/1
5 INJECTION, POWDER, FOR SOLUTION INTRAMUSCULAR; INTRAVENOUS ONCE
Status: COMPLETED | OUTPATIENT
Start: 2022-05-24 | End: 2022-05-24

## 2022-05-24 RX ORDER — MAGNESIUM SULFATE HEPTAHYDRATE 40 MG/ML
2 INJECTION, SOLUTION INTRAVENOUS
Status: DISCONTINUED | OUTPATIENT
Start: 2022-05-24 | End: 2022-05-26 | Stop reason: HOSPADM

## 2022-05-24 RX ORDER — METHYLERGONOVINE MALEATE 0.2 MG/ML
200 INJECTION INTRAVENOUS
Status: DISCONTINUED | OUTPATIENT
Start: 2022-05-24 | End: 2022-05-24 | Stop reason: HOSPADM

## 2022-05-24 RX ORDER — OXYTOCIN 10 [USP'U]/ML
10 INJECTION, SOLUTION INTRAMUSCULAR; INTRAVENOUS
Status: DISCONTINUED | OUTPATIENT
Start: 2022-05-24 | End: 2022-05-24 | Stop reason: HOSPADM

## 2022-05-24 RX ORDER — METHIMAZOLE 10 MG/1
10 TABLET ORAL 2 TIMES DAILY
Status: DISCONTINUED | OUTPATIENT
Start: 2022-05-24 | End: 2022-05-26 | Stop reason: HOSPADM

## 2022-05-24 RX ORDER — LIDOCAINE 40 MG/G
CREAM TOPICAL
Status: DISCONTINUED | OUTPATIENT
Start: 2022-05-24 | End: 2022-05-24 | Stop reason: HOSPADM

## 2022-05-24 RX ORDER — OXYTOCIN 10 [USP'U]/ML
10 INJECTION, SOLUTION INTRAMUSCULAR; INTRAVENOUS
Status: DISCONTINUED | OUTPATIENT
Start: 2022-05-24 | End: 2022-05-26 | Stop reason: HOSPADM

## 2022-05-24 RX ORDER — OXYTOCIN/0.9 % SODIUM CHLORIDE 30/500 ML
340 PLASTIC BAG, INJECTION (ML) INTRAVENOUS CONTINUOUS PRN
Status: DISCONTINUED | OUTPATIENT
Start: 2022-05-24 | End: 2022-05-26 | Stop reason: HOSPADM

## 2022-05-24 RX ORDER — NIFEDIPINE 10 MG/1
CAPSULE ORAL
Status: DISPENSED
Start: 2022-05-24 | End: 2022-05-24

## 2022-05-24 RX ORDER — CARBOPROST TROMETHAMINE 250 UG/ML
250 INJECTION, SOLUTION INTRAMUSCULAR
Status: DISCONTINUED | OUTPATIENT
Start: 2022-05-24 | End: 2022-05-26 | Stop reason: HOSPADM

## 2022-05-24 RX ORDER — IBUPROFEN 800 MG/1
800 TABLET, FILM COATED ORAL
Status: DISCONTINUED | OUTPATIENT
Start: 2022-05-24 | End: 2022-05-26 | Stop reason: HOSPADM

## 2022-05-24 RX ORDER — CITRIC ACID/SODIUM CITRATE 334-500MG
30 SOLUTION, ORAL ORAL
Status: DISCONTINUED | OUTPATIENT
Start: 2022-05-24 | End: 2022-05-24 | Stop reason: HOSPADM

## 2022-05-24 RX ORDER — ONDANSETRON 4 MG/1
4 TABLET, ORALLY DISINTEGRATING ORAL EVERY 6 HOURS PRN
Status: DISCONTINUED | OUTPATIENT
Start: 2022-05-24 | End: 2022-05-24 | Stop reason: HOSPADM

## 2022-05-24 RX ORDER — HYDROCORTISONE 25 MG/G
CREAM TOPICAL 3 TIMES DAILY PRN
Status: DISCONTINUED | OUTPATIENT
Start: 2022-05-24 | End: 2022-05-26 | Stop reason: HOSPADM

## 2022-05-24 RX ADMIN — METHIMAZOLE 10 MG: 10 TABLET ORAL at 21:12

## 2022-05-24 RX ADMIN — PROPRANOLOL HYDROCHLORIDE 160 MG: 80 CAPSULE, EXTENDED RELEASE ORAL at 10:04

## 2022-05-24 RX ADMIN — METHIMAZOLE 10 MG: 10 TABLET ORAL at 08:42

## 2022-05-24 RX ADMIN — KETOROLAC TROMETHAMINE 30 MG: 30 INJECTION, SOLUTION INTRAMUSCULAR at 06:39

## 2022-05-24 RX ADMIN — PRENATAL VIT W/ FE FUMARATE-FA TAB 27-0.8 MG 1 TABLET: 27-0.8 TAB at 08:41

## 2022-05-24 RX ADMIN — PENICILLIN G POTASSIUM 5 MILLION UNITS: 5000000 POWDER, FOR SOLUTION INTRAMUSCULAR; INTRAPLEURAL; INTRATHECAL; INTRAVENOUS at 05:44

## 2022-05-24 RX ADMIN — TRANEXAMIC ACID 1 G: 1 INJECTION, SOLUTION INTRAVENOUS at 06:21

## 2022-05-24 RX ADMIN — Medication 340 ML/HR: at 06:42

## 2022-05-24 RX ADMIN — SODIUM CHLORIDE, POTASSIUM CHLORIDE, SODIUM LACTATE AND CALCIUM CHLORIDE 125 ML/HR: 600; 310; 30; 20 INJECTION, SOLUTION INTRAVENOUS at 06:27

## 2022-05-24 RX ADMIN — DOCUSATE SODIUM 100 MG: 100 CAPSULE, LIQUID FILLED ORAL at 08:41

## 2022-05-24 RX ADMIN — ACETAMINOPHEN 650 MG: 325 TABLET ORAL at 21:12

## 2022-05-24 ASSESSMENT — ACTIVITIES OF DAILY LIVING (ADL)
WALKING_OR_CLIMBING_STAIRS_DIFFICULTY: NO
DRESSING/BATHING_DIFFICULTY: NO
DIFFICULTY_EATING/SWALLOWING: NO
TOILETING_ISSUES: NO
FALL_HISTORY_WITHIN_LAST_SIX_MONTHS: NO
WEAR_GLASSES_OR_BLIND: NO
DOING_ERRANDS_INDEPENDENTLY_DIFFICULTY: NO
CONCENTRATING,_REMEMBERING_OR_MAKING_DECISIONS_DIFFICULTY: NO
CHANGE_IN_FUNCTIONAL_STATUS_SINCE_ONSET_OF_CURRENT_ILLNESS/INJURY: NO

## 2022-05-24 NOTE — PLAN OF CARE
Yusuf arrived to [facility] with complaint of contractions. Reports contractions started yesterday evening. Denies bleeding or LOF. SVE 6-/-1, intact membranes. Dr Alvarez notified of patient arrival. Orders placed. Anticipate .

## 2022-05-24 NOTE — L&D DELIVERY NOTE
OB Vaginal Delivery Note    Yusuf Stubbs MRN# [id number]   Age: 26 year old YOB: 1995       GA: 35w0d  GP:   Labor Complications: None   EBL:   mL  Delivery QBL: 100 mL  Delivery Type: Vaginal, Spontaneous   ROM to Delivery Time: (Delivered) Minutes: 0   Weight: 2.13 kg (4 lb 11.1 oz)    1 Minute 5 Minute 10 Minute   Apgar Totals: 8   9        JAYLENE FISHER;ANGELA VELAZQUEZ;ZAK SÁNCHEZ;STARR HERRERA     Delivery Details:  Yusuf Stubbs, a 26 year old  female delivered a viable , 35 week, infant with apgars of 8  and 9 . She presented to the hospital at 5 cm, then progressed over the next couple of hours to complete. SROM occurred when she was pushing. Delivery was via vaginal, spontaneous  to a sterile field under none  anesthesia. Infant delivered in vertex  right  occiput  anterior  position. Anterior and posterior shoulders delivered without difficulty. The cord was clamped, cut twice and 3 vessels  were noted. Cord blood was obtained in routine fashion with the following disposition: lab .      She was given penicillin for GBS unknown at 35 weeks.     She was given TXA just before delivery for history of postpartum hemorrhage.     Please see my H&P for details on pregnancy history.     Cord complications: none   Placenta delivered at 2022  6:34 AM . Placental disposition was Pathology . Fundal massage performed and fundus found to be firm.     Episiotomy: none    Perineum, vagina, cervix were inspected, and the following lacerations were noted:   Perineal lacerations: none                  Excellent hemostasis was noted. Needle count correct. Infant and patient in delivery room in good and stable condition.        Dyan Stubbs-Yusuf [[id number]]    Labor Event Times    Labor onset date: 22 Onset time: 10:00 PM      Labor Length    3rd Stage (hrs): 0 (min): 5      Labor Events     labor?: Yes   steroids: None  Labor Type: Spontaneous      Antibiotics received during labor?: Yes     Rupture date/time: 22 0627   Rupture type: Spontaneous rupture of membranes occuring during spontaneous labor or augmentation  Fluid color: Clear     Augmentation: None     Delivery/Placenta Date and Time    Delivery Date: 22 Delivery Time:  6:28 AM   Placenta Date/Time: 2022  6:34 AM  Delivering clinician: Nasra Alvarez MD   Other personnel present at delivery:  Provider Role   Lillian Figueroa RN Delivery Nurse   William Law RN Registered Nurse   Jennifer Rosa RN Registered Nurse   Zoë Herrera PA-C Nurse Practitioner         Vaginal Counts     Initial count performed by 2 team members:  Two Team Members   MD Henry Alvarez, RN       Needles Suture Needles Sponges (RETIRED) Instruments   Initial counts   5    Added to count       Relief counts       Final counts             Placed during labor Accounted for at the end of labor   FSE NA NA   IUPC NA NA   Cervidil NA NA                     Apgars    Living status: Living   1 Minute 5 Minute 10 Minute 15 Minute 20 Minute   Skin color: 0  1       Heart rate: 2  2       Reflex irritability: 2  2       Muscle tone: 2  2       Respiratory effort: 2  2       Total: 8  9       Apgars assigned by: NADIA HERRERA PA-C     Cord    Vessels: 3 Vessels    Cord Complications: None               Cord Blood Disposition: Lab    Gases Sent?: No    Delayed cord clamping?: Yes    Cord Clamping Delay (seconds): 31-60 seconds    Stem cell collection?: No        Resuscitation    Methods: None  [place] Care at Delivery: LINA Delivery Note    Asked by Dr. Alvarez to attend the delivery of this , male infant with a gestational age of 35 0/7 weeks secondary to  delivery.      Infant delivered at 0628 hours on 2022. Infant had spontaneous respirations at birth. Delayed cord clamping was performed for 1 minute. He was placed on a warmer, dried, stimulated, and bulb suctioned at birth. Apgars were 8  "at one minute and 9 at five minutes of age. Gross PE is WNL.  He will remain with mom for LPI routine care.    Zoë Briceno PA-C 2022 6:41 AM   Advanced Practice Providers          Measurements    Weight: 4 lb 11.1 oz Length: 1' 6.25\"   Head circumference: 30.5 cm       Skin to Skin and Feeding Plan    Skin to skin initiation date/time: 1841    Skin to skin with: Mother  Skin to skin end date/time:     Reason skin to skin not initiated:  Acuity     Labor Events and Shoulder Dystocia    Fetal Tracing Prior to Delivery: Category 1  Shoulder dystocia present?: Neg     Delivery (Maternal) (Provider to Complete) ([id number])    Episiotomy: None  Perineal lacerations: None    Repair suture: None  Genital tract inspection done: Pos     Blood Loss  Mother: Yusuf Stubbs Y #[id number]   Start of Mother's Information    Delivery Blood Loss  22 2200 - 22 0704    Delivery QBL (mL) Hospital Encounter 100 mL    Total  100 mL         End of Mother's Information  Mother: Yusuf Stubbs Y #[id number]          Delivery - Provider to Complete ([id number])    Delivering clinician: Nasra Alvarez MD  Attempted Delivery Types (Choose all that apply): Spontaneous Vaginal Delivery  Delivery Type (Choose the 1 that will go to the Birth History): Vaginal, Spontaneous                   Other personnel:  Provider Role   Lillian Figueroa, RN Delivery Nurse   William Law RN Registered Nurse   Jennifer Rosa RN Registered Nurse   Zoë Briceno PA-C Nurse Practitioner                Placenta    Date/Time: 2022  6:34 AM  Removal: Spontaneous  Disposition: Pathology           Anesthesia    Method: None                Presentation and Position    Presentation: Vertex    Position: Right Occiput Anterior                 Nasra Alvarez MD  "

## 2022-05-24 NOTE — PROGRESS NOTES
Pharmacy paged at 0830  that 1/2 dose of propanolol is missing . States they'll send med.       0930 Pharmacy paged again to send remainder dose of propanolol  As med is now shown as late . They say they'll send it asap.     Pt up to bath with steady gait. Plan for postpartum care discussed.     Karen J Schoenberg, RN

## 2022-05-24 NOTE — H&P
Maternal Admission H&P  [facility]  Date of Admission: 2022  Date of Service: 2022    Name      Yusuf Stubbs         1995  MRN       [id number]  PCP        Victorino White at [facility], [phone].    ________________________________________________________________________    Assessment and Plan:  26 year old  at 35w0d.    Baby AGA. Anticipate . History of  delivery x2 (35 and 36 weeks).    Group B strep: unknown. Will start penicillin    Gestational hypertension: first BP in severe range (systolic 180s), but repeats have all been below threshold for treatment (140s-150s/70s-80s). If she has another severe range pressure, will treat and will start mag sulfate. HELLP labs pending. Continue home propranolol.    Graves disease: continue methimazole and propranolol postpartum    COVID-19 infection in 2021 and 2022.  ________________________________________________________________________    Chief Complaint: active labor management.    HPI: Yusuf Stubbs is a 26 year old woman at 35w0d, based on an Estimated Date of Delivery: 2022. She presents with regular contractions which began around 10:00 PM. She has a history of 2 previous  deliveries at 35 and 36 weeks. Pregnancy has been complicated by hyperthyroidism (on methimazole and propranolol), chronic hypertension (on propranolol as above), and COVID-19 infection in 2021 and 2022.     She has been followed by MFM for her chronic hypertension and Graves disease, and has been getting weekly BPPs and NSTs.     Patient Active Problem List    Diagnosis Date Noted     Pregnant 2022     Priority: Medium     Encounter for triage in pregnant patient 2022     Priority: Medium     Lab test positive for detection of COVID-19 virus 2021     Priority: Medium     Graves disease 02/15/2017     Priority: Medium     Hyperthyroidism affecting  pregnancy 2017     Priority: Medium     Hepatitis A vaccination not up to date 2016     Priority: Medium     Hyperthyroidism 2016     Priority: Medium     Not immune to hepatitis B virus 2016     Priority: Medium     Susceptible to varicella (non-immune), currently pregnant 2016     Priority: Medium     Rubella non-immune status, antepartum 2016     Priority: Medium      OB History    Para Term  AB Living   4 3 1 2 0 3   SAB IAB Ectopic Multiple Live Births   0 0 0 0 3      # Outcome Date GA Lbr Senthil/2nd Weight Sex Delivery Anes PTL Lv   4 Current            3  17 36w2d 08:38 / 00:02 2.295 kg (5 lb 1 oz) M Vag-Spont None N LUKE      Name: Ha      Apgar1: 8  Apgar5: 9   2  16 35w0d 00:30 / 00:32 1.79 kg (3 lb 15.1 oz) M Vag-Spont None Y LUKE      Name: Marito      Apgar1: 8  Apgar5: 9   1 Term 10/10/13 40w0d  2.495 kg (5 lb 8 oz) F Vag-Spont   LUKE      Birth Comments: 2nd deg labial lac repaired      Name: Nuvia      Apgar1: 9  Apgar5: 9     Review of Systems Negative except what is noted in HPI.   Past Medical History:   Diagnosis Date     Endocrine disease      Hypertension      Hyperthyroidism 2016     Precipitous delivery, delivered (current hospitalization) 2016      delivery 2016     Unspecified fetal growth retardation, unspecified (weight) 2013     Urinary tract infection       Past Surgical History:   Procedure Laterality Date     NO PAST SURGERIES     Patient has no known allergies.  Family History   Problem Relation Age of Onset     No Known Problems Father      Thyroid Disease Mother      Mental retardation Brother      Social History     Socioeconomic History     Marital status: Single     Spouse name: Not on file     Number of children: Not on file     Years of education: Not on file     Highest education level: Not on file   Occupational History     Not on file   Tobacco Use     Smoking status: Former Smoker      Types: Cigarettes     Smokeless tobacco: Never Used   Substance and Sexual Activity     Alcohol use: No     Drug use: No     Sexual activity: Yes     Partners: Male     Birth control/protection: None   Other Topics Concern     Not on file   Social History Narrative     Not on file     Social Determinants of Health     Financial Resource Strain: Not on file   Food Insecurity: Not on file   Transportation Needs: Not on file   Physical Activity: Not on file   Stress: Not on file   Social Connections: Not on file   Intimate Partner Violence: Not on file   Housing Stability: Not on file     Prior to Admission Medication List  Medications Prior to Admission   Medication Sig Dispense Refill Last Dose     acetaminophen (TYLENOL) 500 MG tablet Take 500-1,000 mg by mouth every 6 hours as needed for mild pain   Past Week at Unknown time     aspirin (ASA) 81 MG EC tablet Take 1 tablet (81 mg) by mouth daily 100 tablet 2 5/23/2022 at Unknown time     methimazole (TAPAZOLE) 10 MG tablet Take 1 tablet (10 mg) by mouth 2 times daily 60 tablet 3 5/23/2022 at Unknown time     Prenatal Vit-Fe Fumarate-FA (PRENATAL MULTIVITAMIN W/IRON) 27-0.8 MG tablet Take 1 tablet by mouth daily 90 tablet 3 5/23/2022 at Unknown time     propranolol ER (INDERAL LA) 160 MG 24 hr capsule Take 1 capsule (160 mg) by mouth daily 30 capsule 3 5/23/2022 at Unknown time      Allergies  No Known Allergies  Immunization History   Administered Date(s) Administered     COVID-19,PF,Pfizer 12+ Yrs (2022 and After) 02/01/2022     FLU 6-35 months 10/09/2013     HepB, Unspecified 1995     Influenza Vaccine IM > 6 months Valent IIV4 (Alfuria,Fluzone) 11/16/2021     Influenza Vaccine, 6+MO IM (QUADRIVALENT W/PRESERVATIVES) 11/21/2016     MMR 1995, 10/11/2013, 06/06/2016     TDAP Vaccine (Adacel) 10/11/2013     Td (Adult), Adsorbed 1995     Tdap (Adacel,Boostrix) 06/02/2016, 05/11/2022      Physical Exam  Patient Vitals for the past 24 hrs:   BP Temp  "Temp src Pulse Resp Height Weight   05/24/22 0643 (!) 156/83 -- -- -- -- -- --   05/24/22 0638 (!) 140/79 -- -- -- -- -- --   05/24/22 0606 (!) 143/78 -- -- -- -- -- --   05/24/22 0546 (!) 147/84 -- -- -- -- -- --   05/24/22 0537 -- -- -- -- -- 1.448 m (4' 9\") 52.6 kg (116 lb)   05/24/22 0534 (!) 189/91 98.8  F (37.1  C) Oral 57 18 -- --     Wt Readings from Last 1 Encounters:   05/24/22 52.6 kg (116 lb)   Prepregnancy: Pregravid weight not on file, BMI Could not be calculated. Total gain: Not found. (expected gain: Could not be calculated).    HEART: RRR, no murmur  LUNGS: CTA bilaterally  Fetal Heart Tones: Baseline 140 bpm, variability moderate (6-25 bpm), no decelerations. Reactive.  CONTRACTIONS:  regular, every 3 minutes.  CERVIX: dilation 7 cm , 100% effaced, soft, and -1 station.  FLUID: None noted.  Fetal Presentation: vertex.  Labs  B pos  Hep B neg  GBS unknown  No results found for: GBPCRT   Antibody Screen   Date Value Ref Range Status   04/26/2022 Negative Negative Final     Hepatitis B Surface Antigen   Date Value Ref Range Status   11/16/2021 Nonreactive Nonreactive Final     Hemoglobin   Date Value Ref Range Status   05/24/2022 12.2 11.7 - 15.7 g/dL Final      Admission on 05/24/2022   Component Date Value Ref Range Status     WBC Count 05/24/2022 11.0  4.0 - 11.0 10e3/uL Final     RBC Count 05/24/2022 4.71  3.80 - 5.20 10e6/uL Final     Hemoglobin 05/24/2022 12.2  11.7 - 15.7 g/dL Final     Hematocrit 05/24/2022 38.0  35.0 - 47.0 % Final     MCV 05/24/2022 81  78 - 100 fL Final     MCH 05/24/2022 25.9 (A) 26.5 - 33.0 pg Final     MCHC 05/24/2022 32.1  31.5 - 36.5 g/dL Final     RDW 05/24/2022 12.8  10.0 - 15.0 % Final     Platelet Count 05/24/2022 175  150 - 450 10e3/uL Final     AST 05/24/2022 17  0 - 40 U/L Final     ALT 05/24/2022 11  0 - 45 U/L Final     Creatinine 05/24/2022 0.42 (A) 0.60 - 1.10 mg/dL Final     GFR Estimate 05/24/2022 >90  >60 mL/min/1.73m2 Final    Effective December 21, 2021 " eGFRcr in adults is calculated using the 2021 CKD-EPI creatinine equation which includes age and gender (Jackie et al., NEJM, DOI: 10.1056/TAQJyl3312899)        Completed by:   Nasra Alvarez MD  [facility]  5/24/2022 6:48 AM   used: Not needed.

## 2022-05-24 NOTE — PROGRESS NOTES
SPIRITUAL HEALTH SERVICES NOTE  [facility]    SPIRITUAL ASSESSMENT    In good spirits    Identifies support from her parents and florindae    Shamanistic; will do a spirit calling at home    Denies any concerns at this time    CARE PROVIDED  Empathic listening and presence  Explored/identified sources of strength    SPIRITUAL CARE NOTE  Saw Yusuf due to admission screening response. She was alone in the room and feeding baby Toni. Yusuf says that both she and Toni are doing well.  She has 4 other children at home and says that she was good support from her fiancee and her parents. She notes that she had two preemies about 11 months apart. Since they required additional care, she learned to ask for help. She holds traditional Shamanistic Hmong beliefs and plans to have a spirit-calling ritual after she returns home. She denies any concerns at this time.    Visit Length: 15 minutes    Plan of Care: Will remain available for further support as patient/family needs/desires.    Jaqui Blake M.Div.      Office: [phone] (for non-urgent requests)  Please Vocera or page through [facility] for time-sensitive requests

## 2022-05-24 NOTE — PROGRESS NOTES
Dr. Alvarez updated on severe range BP on admission, pt has been on antihypertensive medication this pregnancy. Orders received for oral Nifedipine PRN, HELLP labs, plan to begin magnesium if pt has another severe range BP.

## 2022-05-24 NOTE — LACTATION NOTE
This note was copied from a baby's chart.  This writer met with Yusuf to offer lactation support.  She states she did breastfeed her first child and then with her other children, she has thyroid trouble and did not make milk.  She used donor milk for 4 months each child.  She denies any breast changes.  This writer offered to have her pump with the hospital grade breast pump to see if her body would make milk with this baby.  She eagerly replied that she wanted to pump and try to make breast milk for her infant.  This writer set up the hospital grade breast pump, teaching her the assembly and use of the pump.  She was encouraged to pump at least 8 times in 24 hours.  She may not get any drops for the next couple of days but the stimulation will help communicate to the body to make milk.  We discussed renting a hospital grade breast pump for home use.  After 1-2 weeks, her body would be able to tell her if it is going to make any milk.  At that time, she can decide to continue or to return the hospital grade breast pump.  She verbalizes understanding of all education given.  She denies any further questions.

## 2022-05-25 PROBLEM — Z36.89 ENCOUNTER FOR TRIAGE IN PREGNANT PATIENT: Status: RESOLVED | Noted: 2022-05-24 | Resolved: 2022-05-25

## 2022-05-25 PROBLEM — Z34.90 PREGNANT: Status: RESOLVED | Noted: 2022-05-24 | Resolved: 2022-05-25

## 2022-05-25 PROCEDURE — 99207 PR SUBSEQUENT HOSPITAL CARE FOR MOTHER, 15 MINUTES: CPT | Performed by: FAMILY MEDICINE

## 2022-05-25 PROCEDURE — 120N000001 HC R&B MED SURG/OB

## 2022-05-25 PROCEDURE — 250N000013 HC RX MED GY IP 250 OP 250 PS 637: Performed by: FAMILY MEDICINE

## 2022-05-25 RX ADMIN — METHIMAZOLE 10 MG: 10 TABLET ORAL at 10:53

## 2022-05-25 RX ADMIN — PROPRANOLOL HYDROCHLORIDE 160 MG: 80 CAPSULE, EXTENDED RELEASE ORAL at 10:53

## 2022-05-25 RX ADMIN — ACETAMINOPHEN 650 MG: 325 TABLET ORAL at 08:06

## 2022-05-25 RX ADMIN — METHIMAZOLE 10 MG: 10 TABLET ORAL at 23:27

## 2022-05-25 RX ADMIN — ACETAMINOPHEN 650 MG: 325 TABLET ORAL at 16:42

## 2022-05-25 RX ADMIN — PRENATAL VIT W/ FE FUMARATE-FA TAB 27-0.8 MG 1 TABLET: 27-0.8 TAB at 10:52

## 2022-05-25 RX ADMIN — DOCUSATE SODIUM 100 MG: 100 CAPSULE, LIQUID FILLED ORAL at 10:52

## 2022-05-25 ASSESSMENT — ACTIVITIES OF DAILY LIVING (ADL)
ADLS_ACUITY_SCORE: 18

## 2022-05-25 NOTE — LACTATION NOTE
Lactation consultant to patient room to discuss milk supply and pump rental. Mom states she is still not seeing any milk when pumping and is still deciding if she wants to take the time to keep pumping if effort is not going to pay off with milk supply increases. Lactation consultant to patient room to discuss pump rental 5/26/22 prior to discharge. Physician in room and states she will write order.

## 2022-05-25 NOTE — PLAN OF CARE
Problem: Adjustment to Role Transition (Postpartum Vaginal Delivery)  Goal: Successful Maternal Role Transition  Outcome: Ongoing, Progressing     Problem: Bleeding (Postpartum Vaginal Delivery)  Goal: Hemostasis  Outcome: Ongoing, Progressing     Problem: Infection (Postpartum Vaginal Delivery)  Goal: Absence of Infection Signs/Symptoms  Outcome: Ongoing, Progressing     Problem: Pain (Postpartum Vaginal Delivery)  Goal: Acceptable Pain Control  Outcome: Ongoing, Progressing     Problem: Urinary Retention (Postpartum Vaginal Delivery)  Goal: Effective Urinary Elimination  Outcome: Ongoing, Progressing    Patients VSS, patient is independent with self cares and infant cares.  Is trying to work on pumping for breast milk.   Will continue to monitor.

## 2022-05-25 NOTE — PROGRESS NOTES
Maternal Postpartum Progress Note  [facility]  Date of Service: 2022    Name      Yusuf BLUNT       1995  MRN       [id number]  PCP        Victorino White    ________________________________________________________________________  Patient is now26 year old  s/p  at 35w0d, delivered yesterday by Dr. Alvarez for Dr White  no lacerations and no complications.     Assessment:   Postpartum Day #1 s/p vaginal delivery  doing well without concerns.      Patient Active Problem List    Diagnosis Date Noted     Pregnant 2022     Priority: Medium     Encounter for triage in pregnant patient 2022     Priority: Medium     Lab test positive for detection of COVID-19 virus 2021     Priority: Medium     Graves disease 02/15/2017     Priority: Medium     Hyperthyroidism affecting pregnancy 2017     Priority: Medium     Hepatitis A vaccination not up to date 2016     Priority: Medium     Hyperthyroidism 2016     Priority: Medium     Not immune to hepatitis B virus 2016     Priority: Medium     Susceptible to varicella (non-immune), currently pregnant 2016     Priority: Medium     Rubella non-immune status, antepartum 2016     Priority: Medium        Plan:   1) Continue routine postpartum cares.  2) Lactation: working with mom on pumping   3) VACCINATIONS:  MMR needed  4) DISPO: anticipate discharge home tomorrow.   Patient agreeable to home nurse visit for herself and  after discharge.    Subjective:  Patient doing well with no concerns. Lochia is minimal without clots.  Pain is well controlled with Ibuprofen.  Eating and ambulating well. Normal urine output. No constipation.  Patient denies headache, dizziness, dyspnea, and leg pain.  The baby is well and being fed by bottle.    Doing well- minimal bleeding or cramping.  Up without difficulty. Starting to pump.  Baby is taking 22kcal formula by bottle  well.    Lactation ongoing     Objective:  Patient Vitals for the past 24 hrs:   BP Temp Temp src Pulse Resp SpO2   05/25/22 0806 122/68 98.2  F (36.8  C) Oral 66 16 --   05/25/22 0649 117/72 97.8  F (36.6  C) Oral 79 16 --   05/24/22 2115 135/75 98  F (36.7  C) Oral -- 16 --   05/24/22 1530 119/67 98.4  F (36.9  C) Oral 79 16 97 %   05/24/22 1230 117/66 -- -- 76 16 --     General: Alert, comfortable.  Heart: RRR, no murmur.  Lungs: CTA bilaterally.  Abdomen: non-distended. Uterine fundus firm, below umbilicus.  Ext: trace edema, no calf tenderness.    Labs:  No results found for this or any previous visit (from the past 24 hour(s)).         Completed by:   Carolyn Brush MD, M.D.  [facility]  5/25/2022 11:00 AM   used: Not needed.

## 2022-05-26 VITALS
SYSTOLIC BLOOD PRESSURE: 117 MMHG | TEMPERATURE: 97.7 F | HEART RATE: 73 BPM | HEIGHT: 57 IN | RESPIRATION RATE: 16 BRPM | WEIGHT: 116 LBS | BODY MASS INDEX: 25.03 KG/M2 | DIASTOLIC BLOOD PRESSURE: 78 MMHG | OXYGEN SATURATION: 98 %

## 2022-05-26 LAB
PATH REPORT.COMMENTS IMP SPEC: NORMAL
PATH REPORT.COMMENTS IMP SPEC: NORMAL
PATH REPORT.FINAL DX SPEC: NORMAL
PATH REPORT.GROSS SPEC: NORMAL
PATH REPORT.MICROSCOPIC SPEC OTHER STN: NORMAL
PATH REPORT.RELEVANT HX SPEC: NORMAL
PHOTO IMAGE: NORMAL

## 2022-05-26 PROCEDURE — 99207 PR SUBSEQUENT HOSPITAL CARE FOR MOTHER, 15 MINUTES: CPT | Performed by: FAMILY MEDICINE

## 2022-05-26 PROCEDURE — 250N000013 HC RX MED GY IP 250 OP 250 PS 637: Performed by: FAMILY MEDICINE

## 2022-05-26 RX ORDER — ACETAMINOPHEN 325 MG/1
650 TABLET ORAL EVERY 4 HOURS PRN
Qty: 100 TABLET | Refills: 0 | Status: SHIPPED | OUTPATIENT
Start: 2022-05-26 | End: 2023-02-17

## 2022-05-26 RX ORDER — DOCUSATE SODIUM 100 MG/1
100 CAPSULE, LIQUID FILLED ORAL DAILY
Qty: 30 CAPSULE | Refills: 0 | Status: SHIPPED | OUTPATIENT
Start: 2022-05-27 | End: 2023-02-17

## 2022-05-26 RX ADMIN — PRENATAL VIT W/ FE FUMARATE-FA TAB 27-0.8 MG 1 TABLET: 27-0.8 TAB at 08:46

## 2022-05-26 RX ADMIN — DOCUSATE SODIUM 100 MG: 100 CAPSULE, LIQUID FILLED ORAL at 08:46

## 2022-05-26 RX ADMIN — ACETAMINOPHEN 650 MG: 325 TABLET ORAL at 06:19

## 2022-05-26 RX ADMIN — METHIMAZOLE 10 MG: 10 TABLET ORAL at 20:30

## 2022-05-26 RX ADMIN — METHIMAZOLE 10 MG: 10 TABLET ORAL at 08:46

## 2022-05-26 RX ADMIN — PROPRANOLOL HYDROCHLORIDE 160 MG: 80 CAPSULE, EXTENDED RELEASE ORAL at 08:46

## 2022-05-26 ASSESSMENT — ACTIVITIES OF DAILY LIVING (ADL)
ADLS_ACUITY_SCORE: 18

## 2022-05-26 NOTE — DISCHARGE INSTRUCTIONS
You have a Home Care nurse visit planned for Tu, May 29th. The nurse will contact you after discharge to confirm the appointment time. If you do not hear from the nurse by Tu morning, please call [phone]. Do not schedule a clinic appointment on the same day as home nurse visit.

## 2022-05-26 NOTE — PROGRESS NOTES
Outreach Nurse Note    Yusuf Stubbs  [id number]  1995    Chart reviewed, discharge plan discussed with attending MD and bedside RN, needs assessed. If stable, discharge planned for later today. Post-delivery follow-up appointment with  planned in 4 weeks at the [facility]. Patient, Yusuf, requests home care visit as ordered, nurse visit planned for , , Home Care Intake updated by this writer.      Yusuf is reported to have support at home and would like to discharge later today with . Outreach RN will continue to follow and assist as needed with discharge plan. No additional needs identified at this time.

## 2022-05-26 NOTE — DISCHARGE SUMMARY
Maternal Discharge Summary  [facility]  Date of Service: 2022    Name      Yusuf Stubbs         1995  MRN       [id number]  PCP        Victorino Culp at [facility], [phone].    Admission Date:  2022  Discharge Date:  2022  ________________________________________________________________________    Assessment:  Yusuf Stubbs is a 26 year old now  s/p vaginal, spontaneous  at 35w0d on 22.  Active Problems:    Graves disease    Rubella non-immune status, antepartum    Normal delivery      Discharge Plan:   1. Discharge to Home. Condition at Discharge:  Stable  2. Physical activity:as tolerated  Watch for postpartum warning signs as discussed  3. Diet:  as tolerated  4. Home care nurse: ordered for 1-2 days from now.  5. Lactation clinic appointment: not indicated.  6. Contraception plan: undecided, will follow up at postpartum visit.  7. Follow up with Victorino Culp in 4 weeks.  8. Follow-up with endocrine next week as scheduled.    Future Appointments   Date Time Provider Department Center   2022  3:00 PM [facility]   [facility]  [facility]   2022  1:30 PM Stanton Ybarra MD URPEG UMP [facility]   6/10/2022 10:30 AM Matthew Echols MD MDENDO [facility]PLW   6/15/2022 10:20 AM Victorino Whiet MD ICFMOB [facility]   2022  9:00 AM Victorino White MD ICFMOB [facility]   2022  9:00 AM Victorino White MD ICFMOB [facility]      8.   Immunizations:Needs MMR     ________________________________________________________________________      Estimated Date of Delivery: Data Unavailable  Gestational Age at Delivery: 35w0d    Principal Diagnosis: Labor and delivery  Delivery type: Vaginal, Spontaneous     Subjective:  Postpartum day #2 s/p NVD  Patient doing well with no concerns. Lochia is mild without clots.  Pain is well controlled with Ibuprofen.  Eating  and ambulating well. Normal urine output. Normal mood and affect.  Patient denies headache, dizziness, dyspnea, and leg pain.   Lactation has been trying to pump but no results- declines pump for home use- will just use formula   The baby is well and being fed by bottle.     Discharge Exam:  Patient Vitals for the past 24 hrs:   BP Temp Temp src Pulse Resp SpO2   05/26/22 0755 117/78 97.7  F (36.5  C) Oral 73 16 98 %   05/25/22 2348 119/74 98.4  F (36.9  C) Oral 74 16 98 %   05/25/22 1540 123/81 97.7  F (36.5  C) Oral 70 18 98 %     General - alert, comfortable  Heart - RRR, no murmurs  Lungs - CTA bilaterally  Abdomen - fundus firm, nontender, below umbilicus  Extremities - trace edema  Admission on 05/24/2022   Component Date Value Ref Range Status     Treponema Antibody Total 05/24/2022 Nonreactive  Nonreactive Final     WBC Count 05/24/2022 11.0  4.0 - 11.0 10e3/uL Final     RBC Count 05/24/2022 4.71  3.80 - 5.20 10e6/uL Final     Hemoglobin 05/24/2022 12.2  11.7 - 15.7 g/dL Final     Hematocrit 05/24/2022 38.0  35.0 - 47.0 % Final     MCV 05/24/2022 81  78 - 100 fL Final     MCH 05/24/2022 25.9 (A) 26.5 - 33.0 pg Final     MCHC 05/24/2022 32.1  31.5 - 36.5 g/dL Final     RDW 05/24/2022 12.8  10.0 - 15.0 % Final     Platelet Count 05/24/2022 175  150 - 450 10e3/uL Final     AST 05/24/2022 17  0 - 40 U/L Final     ALT 05/24/2022 11  0 - 45 U/L Final     Creatinine 05/24/2022 0.42 (A) 0.60 - 1.10 mg/dL Final     GFR Estimate 05/24/2022 >90  >60 mL/min/1.73m2 Final    Effective December 21, 2021 eGFRcr in adults is calculated using the 2021 CKD-EPI creatinine equation which includes age and gender ( et al., NEJM, DOI: 10.1056/YADHda9042411)     SARS CoV2 PCR 05/24/2022 Negative  Negative Final    NEGATIVE: SARS-CoV-2 (COVID-19) RNA not detected, presumed negative.     ABO/RH(D) 05/24/2022 B POS   Final     Antibody Screen 05/24/2022 Negative  Negative Final     SPECIMEN EXPIRATION DATE 05/24/2022  [id number]   Final      Discharge Medications:   See medication reconciliation.     Completed by:   Carolyn Brush MD  [facility]  5/26/2022 11:01 AM   used: Not needed.

## 2022-05-26 NOTE — LACTATION NOTE
This note was copied from a baby's chart.  This writer met with Yusuf to assess her needs for discharge, regarding breast pump.  Yusuf states she has changed her mind and does not want to pump her breasts any more.  She did not make any milk with her last two children and does not want to try to make milk with this infant.  She was encouraged to contact the outpatient lactation clinic, (Phone number on the AVS), for any questions if she changes her mind and wants to pump.  She verbalizes understanding of all education given.  She denies any further questions.

## 2022-05-26 NOTE — PLAN OF CARE
Problem: Adjustment to Role Transition (Postpartum Vaginal Delivery)  Goal: Successful Maternal Role Transition  Outcome: Ongoing, Progressing   Pt vs are WNL. Independent with self and baby cares. Denies pain. Would like to discharge today. Will continue to assess.

## 2022-05-26 NOTE — PROGRESS NOTES
Yusuf is set for discharge, though the baby will need to stay one more night.  Yusuf will be discharged after her evening dose of medications and board with her baby.

## 2022-05-27 ENCOUNTER — PATIENT OUTREACH (OUTPATIENT)
Dept: CARE COORDINATION | Facility: CLINIC | Age: 27
End: 2022-05-27
Payer: COMMERCIAL

## 2022-05-27 DIAGNOSIS — Z71.89 OTHER SPECIFIED COUNSELING: ICD-10-CM

## 2022-05-27 NOTE — PROGRESS NOTES
"Clinic Care Coordination Contact  [facility]: Post-Discharge Note  SITUATION                                                      Admission:    Admission Date: 22   Reason for Admission: Pregnancy  Discharge:   Discharge Date: 22  Discharge Diagnosis: Pregnancy,  s/p vaginal, spontaneous    BACKGROUND                                                      Per hospital discharge summary and inpatient provider notes:    Yusuf Stubbs is a 26 year old woman at 35w0d, based on an Estimated Date of Delivery: 2022. She presents with regular contractions which began around 10:00 PM. She has a history of 2 previous  deliveries at 35 and 36 weeks. Pregnancy has been complicated by hyperthyroidism (on methimazole and propranolol), chronic hypertension (on propranolol as above), and COVID-19 infection in 2021 and 2022.      She has been followed by MFM for her chronic hypertension and Graves disease, and has been getting weekly BPPs and NSTs.      ASSESSMENT      Enrollment  Primary Care Care Coordination Status: Declined    Discharge Assessment  How are you doing now that you are home?: \"Ahhh okay.\"  How are your symptoms? (Red Flag symptoms escalate to triage hotline per guidelines): Improved  Do you feel your condition is stable enough to be safe at home until your provider visit?: Yes  Does the patient have their discharge instructions? : Yes  Does the patient have questions regarding their discharge instructions? : No  Were you started on any new medications or were there changes to any of your previous medications? : Yes  Does the patient have all of their medications?: Yes  Do you have questions regarding any of your medications? : No  Do you have all of your needed medical supplies or equipment (DME)?  (i.e. oxygen tank, CPAP, cane, etc.): Yes  Discharge follow-up appointment scheduled within 14 calendar days? : Yes  Discharge Follow Up Appointment Date: 22  Discharge " Follow Up Appointment Scheduled with?: Primary Care Provider (Patient plans to call primary care clinic to schedule a post-partum f/u appt with her OB/GYN for 2 weeks and 6 weeks.)    Post-op (RUPERTW CTA Only)  If the patient had a surgery or procedure, do they have any questions for a nurse?: No      PLAN                                                      Outpatient Plan:      Follow Up  Follow up with provider in 2 weeks and 6 weeks for post-delivery checks    Future Appointments   Date Time Provider Department Center   5/31/2022  3:00 PM [facility]IN [facility]BBOTT  [facility]   5/31/2022  3:30 [facility]   6/7/2022  1:30 PM Stanton Ybarra MD [facility] CLIN   6/10/2022 10:30 AM [facility]   6/15/2022 10:20 AM Victorino White MD ICFMOYESENIA [facility] SPRS   6/22/2022  9:00 AM [facility]   6/29/2022  9:00 AM [facility]         For any urgent concerns, please contact our 24 hour nurse triage line: [phone] ([phone])         JOSE Norman  [phone]  [facility]

## 2022-05-29 ENCOUNTER — MEDICAL CORRESPONDENCE (OUTPATIENT)
Dept: HEALTH INFORMATION MANAGEMENT | Facility: CLINIC | Age: 27
End: 2022-05-29
Payer: COMMERCIAL

## 2022-06-07 ENCOUNTER — OFFICE VISIT (OUTPATIENT)
Dept: OPHTHALMOLOGY | Facility: CLINIC | Age: 27
End: 2022-06-07
Attending: INTERNAL MEDICINE
Payer: COMMERCIAL

## 2022-06-07 DIAGNOSIS — H50.10 EXOTROPIA: ICD-10-CM

## 2022-06-07 DIAGNOSIS — E05.00 GRAVES DISEASE: ICD-10-CM

## 2022-06-07 DIAGNOSIS — E05.00 PROPTOSIS DUE TO THYROID DISORDER: Primary | ICD-10-CM

## 2022-06-07 DIAGNOSIS — H51.11 CONVERGENCE INSUFFICIENCY: ICD-10-CM

## 2022-06-07 PROCEDURE — 92285 EXTERNAL OCULAR PHOTOGRAPHY: CPT | Performed by: OPHTHALMOLOGY

## 2022-06-07 PROCEDURE — 99203 OFFICE O/P NEW LOW 30 MIN: CPT | Mod: GC | Performed by: OPHTHALMOLOGY

## 2022-06-07 PROCEDURE — 92060 SENSORIMOTOR EXAMINATION: CPT | Performed by: OPHTHALMOLOGY

## 2022-06-07 PROCEDURE — G0463 HOSPITAL OUTPT CLINIC VISIT: HCPCS | Mod: 25

## 2022-06-07 PROCEDURE — 92060 SENSORIMOTOR EXAMINATION: CPT | Mod: 26 | Performed by: OPHTHALMOLOGY

## 2022-06-07 ASSESSMENT — VISUAL ACUITY
METHOD: SNELLEN - LINEAR
OD_PH_SC: 20/40
OD_SC+: -2
OD_PH_SC+: +2
OS_PH_SC+: -2
OS_SC: 20/25
OD_SC: 20/70
OS_SC+: -2
OS_PH_SC: 20/20

## 2022-06-07 ASSESSMENT — REFRACTION_MANIFEST
OD_SPHERE: -1.25
OS_AXIS: 090
OD_CYLINDER: +3.50
OS_SPHERE: PLANO
OD_AXIS: 090
OS_CYLINDER: +0.25

## 2022-06-07 ASSESSMENT — TONOMETRY
OS_IOP_MMHG: 15
OD_IOP_MMHG: 13

## 2022-06-07 ASSESSMENT — MARGIN REFLEX DISTANCE
OD_MRD1: 5
OS_MRD1: 5

## 2022-06-07 ASSESSMENT — CONF VISUAL FIELD
METHOD: COUNTING FINGERS
OD_NORMAL: 1
OS_NORMAL: 1

## 2022-06-07 ASSESSMENT — EXTERNAL EXAM - RIGHT EYE: OD_EXAM: PROPTOSIS

## 2022-06-07 ASSESSMENT — CUP TO DISC RATIO
OD_RATIO: 0.4
OS_RATIO: 0.4

## 2022-06-07 ASSESSMENT — LAGOPHTHALMOS
OS_LAGOPHTHALMOS: 3
OD_LAGOPHTHALMOS: 2

## 2022-06-07 ASSESSMENT — EXTERNAL EXAM - LEFT EYE: OS_EXAM: PROPTOSIS L>R

## 2022-06-07 NOTE — PROGRESS NOTES
Chief Complaints and History of Present Illnesses   Patient presents with     Thyroid Disease     Diagnosed with BENSON in 2016. Currently taking methimazole BID and propanolol q.d.   Vision has been blurry, no histiry of glasses wear. No diplopia. Some proptosis each eye stable. Eyes tend to be very itchy, using ATs TID. Photophobic, pt likes to stay in the dark.   Current smoker - about 2 cigarettes per day.      Chief Complaint(s) and History of Present Illness(es)     Thyroid Disease     In both eyes.  Associated symptoms include Negative for double vision,   redness and tearing. Additional comments: Diagnosed with BENSON in 2016.   Currently taking methimazole BID and propanolol q.d.   Vision has been blurry, no histiry of glasses wear. No diplopia. Some   proptosis each eye stable. Eyes tend to be very itchy, using ATs TID.   Photophobic, pt likes to stay in the dark.   Current smoker - about 2 cigarettes per day.        Comments     4/26/22  T3 Total  393    T4 Total  23.6     2/21/22  TSH  <0.01      1/25/22  Thyroid Stim Immunog 4.8 High      Inf: pt         HPI: Patient diagnosed with hyperthyroidism in 2016 due to significant weight loss (40lb in 2 months), voice changes (shaky), breathing heavily, and was started on methimazole at the time. In June 2016 she started noticing prominence of both eyes, blurry vision. She notices intermittent transient horizontal diplopia in the mornings when she is not focused but it resolves with blinking.    Referring physician: Dr. Echols (endocrinology)    Thyroid history:  Diagnosed when? 2016   MCDONOUGH: 2017  Thyroidectomy: n/a    TSI (date):4.8 (1/25/22)     Previous results:none    Eye symptoms (since when):   Proptosis (better/worse/same since last visit): yes since 2016   Diplopia(better/worse/same since last visit): yes, transient in the morning  Eyelid retraction(better/worse/same since last visit): yes  Tearing(better/worse/same since last visit): yes  Redness  (better/worse/same since last visit): yes  Pain ((better/worse/same since last visit): no  Pain to move the eyes (better/worse/same since last visit): yes, left gaze  Blurred vision: yes    Ocular history:   Orbital decompression (date, details): no  Strabismus surgery (date, details): no  Eyelid surgery (date, details): no    Exam:   Vianey (base):100     Better/worse same: initial  Strabismus (better/worse/same): initial- intermittent exotropia at near- controlled  Eyelid retraction (better/worse/same): initial    MARC Score:  1. Spontaneous orbital pain.     no  2. Gaze evoked orbital pain.     yes  3. Eyelid swelling due to active thyroid eye disease  no  4. Eyelid erythema.      no  5. Conjunctival redness due to active thyroid eye disease . yes  6. Chemosis.        no  7. Inflammation of caruncle OR plica.   no    Patients assessed after follow-up can be scored out of 10 by  including items 8-10.    8. Increase of > 2mm in proptosis.    n/a   9. Decrease in uniocular excursion in any direction of > 8 . n/a  10. Decrease of acuity equivalent to 1 Snellen line.  n/a    MARC SCORE = 2/7    Mensah Diplopia Score = 1  0 = no diplopia  1 = intermittent (when tired, upon waking, end of day etc)  2 = inconstant (extreme gazes)  3 = constant    Assessment and plan     1. 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     - patient mostly bothered by tearing and blurry vision after focusing on objects for extended period of time, which is likely related to her dry eye disease worse right>left eye in the setting of lagophthalmos and her proptosis is likely contributing to ocular surface disease due to exposure.  - start ATs QID each eye, lacrilube at bedtime each eye   - continue  methimazole per endocrine (currently on 10mg BID)  - encouraged smoking cessation  -Patient discussed the risk benefits and alternatives of orbital decompression with Dr. Hernandez today and wishes to proceed.    Addendum- Dr. Hernandez   Chronic (stable) thyroid eye disease  Bilateral lateral orbital decompression with navigation about 1mm more proptosis left eye     Today with Yusuf Y Enoc, I reviewed the indications, risks, benefits, and alternatives of the proposed surgical procedure including, but not limited to, failure obtain the desired result  and need for additional surgery, bleeding, infection, loss of vision,change in pupil size, diplopia, scar, numbness, and the remote possibility of permanent damage to any organ system or death with the use of anesthesia.  I provided multiple opportunities for the questions, answered all questions to the best of my ability, and confirmed that my answers and my discussion were understood.   MD Paloma Nicholson MD  Oculoplastics Fellow    35 minutes were spent on the date of the encounter by me doing chart review, history and exam, documentation, and further activities as noted above    Complete documentation of historical and exam elements from today's encounter can be found in the full encounter summary report (not reduplicated in this progress note).  I personally obtained the chief complaint(s) and history of present illness.  I confirmed and edited as necessary the review of systems, past medical/surgical history, family history, social history, and examination findings as documented by others; and I examined the patient myself.  I personally reviewed the relevant tests, images, and reports as documented above.  I formulated and edited as necessary the assessment and plan and discussed the findings and management plan with the patient and family.  I personally reviewed the ophthalmic test(s) associated with this encounter, agree with the  interpretation(s) as documented by the resident/fellow, and have edited the corresponding report(s) as necessary.     Stanton Ybarra MD

## 2022-06-07 NOTE — LETTER
2022    RE: Yusuf Stubbs  : 1995  MRN: [id number]    Dear Dr. Echols    Thank you for referring your patient, Yusuf Stubbs, to our multi-disciplinary thyroid eye disease clinic recently.  After a thorough history followed by a neuro-ophthalmology, strabismus, and oculoplastics examination, we came to the following conclusions:     Chief Complaints and History of Present Illnesses    Patient presents with     Thyroid Disease     Diagnosed with BENSON in 2016. Currently taking methimazole BID and propanolol q.d.   Vision has been blurry, no histiry of glasses wear. No diplopia. Some proptosis each eye stable. Eyes tend to be very itchy, using ATs TID. Photophobic, pt likes to stay in the dark.   Current smoker - about 2 cigarettes per day.      Chief Complaint(s) and History of Present Illness(es)     Thyroid Disease     In both eyes.  Associated symptoms include Negative for double vision,   redness and tearing. Additional comments: Diagnosed with BENSON in 2016.   Currently taking methimazole BID and propanolol q.d.   Vision has been blurry, no histiry of glasses wear. No diplopia. Some   proptosis each eye stable. Eyes tend to be very itchy, using ATs TID.   Photophobic, pt likes to stay in the dark.   Current smoker - about 2 cigarettes per day.        Comments     22  T3 Total  393    T4 Total  23.6     22  TSH  <0.01      22  Thyroid Stim Immunog 4.8 High      Inf: pt         HPI: Patient diagnosed with hyperthyroidism in 2016 due to significant weight loss (40lb in 2 months), voice changes (shaky), breathing heavily, and was started on methimazole at the time. In 2016 she started noticing prominence of both eyes, blurry vision. She notices intermittent transient horizontal diplopia in the mornings when she is not focused but it resolves with blinking.    Referring physician: Dr. Echols (endocrinology)    Thyroid history:  Diagnosed when? 2016   MCDONOUGH: 2017  Thyroidectomy: n/a    TSI  (date):4.8 (1/25/22)     Previous results:none    Eye symptoms (since when):   Proptosis (better/worse/same since last visit): yes since 2016   Diplopia(better/worse/same since last visit): yes, transient in the morning  Eyelid retraction(better/worse/same since last visit): yes  Tearing(better/worse/same since last visit): yes  Redness (better/worse/same since last visit): yes  Pain ((better/worse/same since last visit): no  Pain to move the eyes (better/worse/same since last visit): yes, left gaze  Blurred vision: yes    Ocular history:   Orbital decompression (date, details): no  Strabismus surgery (date, details): no  Eyelid surgery (date, details): no    Exam:   Vianey (base):100     Better/worse same: initial  Strabismus (better/worse/same): initial- intermittent exotropia at near- controlled  Eyelid retraction (better/worse/same): initial    MARC Score:  1. Spontaneous orbital pain.     no  2. Gaze evoked orbital pain.     yes  3. Eyelid swelling due to active thyroid eye disease  no  4. Eyelid erythema.      no  5. Conjunctival redness due to active thyroid eye disease . yes  6. Chemosis.        no  7. Inflammation of caruncle OR plica.   no    Patients assessed after follow-up can be scored out of 10 by  including items 8-10.    8. Increase of > 2mm in proptosis.    n/a   9. Decrease in uniocular excursion in any direction of > 8 . n/a  10. Decrease of acuity equivalent to 1 Snellen line.  n/a    MARC SCORE = 2/7    Mensah Diplopia Score = 1  0 = no diplopia  1 = intermittent (when tired, upon waking, end of day etc)  2 = inconstant (extreme gazes)  3 = constant    Assessment and plan     1. 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     - patient mostly bothered  by tearing and blurry vision after focusing on objects for extended period of time, which is likely related to her dry eye disease worse right>left eye in the setting of lagophthalmos and her proptosis is likely contributing to ocular surface disease due to exposure.  - start ATs QID each eye, lacrilube at bedtime each eye   - continue methimazole per endocrine (currently on 10mg BID)  - encouraged smoking cessation  -Patient discussed the risk benefits and alternatives of orbital decompression with Dr. Hernandez today and wishes to proceed.    Addendum- Dr. Hernandez   Chronic (stable) thyroid eye disease  Bilateral lateral orbital decompression with navigation about 1mm more proptosis left eye     Today with Yusuf Stubbs, I reviewed the indications, risks, benefits, and alternatives of the proposed surgical procedure including, but not limited to, failure obtain the desired result  and need for additional surgery, bleeding, infection, loss of vision,change in pupil size, diplopia, scar, numbness, and the remote possibility of permanent damage to any organ system or death with the use of anesthesia.  I provided multiple opportunities for the questions, answered all questions to the best of my ability, and confirmed that my answers and my discussion were understood.   Marisa Hernandez MD      Thank you for trusting us with the care of your patient.  For further exam details, please feel free to contact our office for additional records.  If you wish to contact us directly regarding this patient please email us or give our clinic a call to arrange a phone conversation.    Sincerely,    Stanton Ybarra MD  , Neuro-Ophthalmology and Adult Strabismus  Department of Ophthalmology and Visual Neurosciences  [facility]  [email or web address]    Marisa Henrandez MD    Oculoplastic and Orbital Surgery   Department of Ophthalmology and Visual Neurosciences  [facility]  [email or web address]     DX: thyroid eye disease, proptosis

## 2022-06-08 ENCOUNTER — LAB (OUTPATIENT)
Dept: LAB | Facility: CLINIC | Age: 27
End: 2022-06-08
Payer: COMMERCIAL

## 2022-06-08 DIAGNOSIS — E05.00 GRAVES DISEASE: ICD-10-CM

## 2022-06-08 DIAGNOSIS — E05.90 HYPERTHYROIDISM: ICD-10-CM

## 2022-06-08 LAB
ALBUMIN SERPL-MCNC: 3.1 G/DL (ref 3.5–5)
ALP SERPL-CCNC: 197 U/L (ref 45–120)
ALT SERPL W P-5'-P-CCNC: 28 U/L (ref 0–45)
ANION GAP SERPL CALCULATED.3IONS-SCNC: 12 MMOL/L (ref 5–18)
AST SERPL W P-5'-P-CCNC: 23 U/L (ref 0–40)
BILIRUB DIRECT SERPL-MCNC: 0.3 MG/DL
BILIRUB SERPL-MCNC: 0.8 MG/DL (ref 0–1)
BUN SERPL-MCNC: 10 MG/DL (ref 8–22)
CALCIUM SERPL-MCNC: 9.4 MG/DL (ref 8.5–10.5)
CHLORIDE BLD-SCNC: 106 MMOL/L (ref 98–107)
CO2 SERPL-SCNC: 23 MMOL/L (ref 22–31)
CREAT SERPL-MCNC: 0.5 MG/DL (ref 0.6–1.1)
GFR SERPL CREATININE-BSD FRML MDRD: >90 ML/MIN/1.73M2
GLUCOSE BLD-MCNC: 92 MG/DL (ref 70–125)
POTASSIUM BLD-SCNC: 4.9 MMOL/L (ref 3.5–5)
PROT SERPL-MCNC: 6.6 G/DL (ref 6–8)
SODIUM SERPL-SCNC: 141 MMOL/L (ref 136–145)
T3 SERPL-MCNC: 455 NG/DL (ref 60–181)
T4 SERPL-MCNC: 22.3 UG/DL (ref 4.5–13)

## 2022-06-08 PROCEDURE — 84480 ASSAY TRIIODOTHYRONINE (T3): CPT

## 2022-06-08 PROCEDURE — 83520 IMMUNOASSAY QUANT NOS NONAB: CPT | Mod: 90

## 2022-06-08 PROCEDURE — 84436 ASSAY OF TOTAL THYROXINE: CPT

## 2022-06-08 PROCEDURE — 99000 SPECIMEN HANDLING OFFICE-LAB: CPT

## 2022-06-08 PROCEDURE — 82248 BILIRUBIN DIRECT: CPT

## 2022-06-08 PROCEDURE — 80053 COMPREHEN METABOLIC PANEL: CPT

## 2022-06-08 PROCEDURE — 36415 COLL VENOUS BLD VENIPUNCTURE: CPT

## 2022-06-08 PROCEDURE — 84445 ASSAY OF TSI GLOBULIN: CPT | Mod: 90

## 2022-06-10 ENCOUNTER — DOCUMENTATION ONLY (OUTPATIENT)
Dept: ENDOCRINOLOGY | Facility: CLINIC | Age: 27
End: 2022-06-10

## 2022-06-10 DIAGNOSIS — E05.90 HYPERTHYROIDISM: ICD-10-CM

## 2022-06-10 DIAGNOSIS — E05.00 GRAVES DISEASE: Primary | ICD-10-CM

## 2022-06-10 LAB — TSH RECEP AB SER-ACNC: 39 IU/L (ref 0–1.75)

## 2022-06-10 RX ORDER — METHIMAZOLE 10 MG/1
20 TABLET ORAL 2 TIMES DAILY
Qty: 120 TABLET | Refills: 2 | Status: SHIPPED | OUTPATIENT
Start: 2022-06-10 | End: 2022-07-06

## 2022-06-10 NOTE — PROGRESS NOTES
Patient canceled endocrinology visit scheduled on 6/10/2022.  I have sent [employer] message with lab results as well as recommendations for adjustment in methimazole dose.  Recommend follow-up in endocrinology at earliest availability.    Matthew Echols MD 6/10/2022 11:13 AM

## 2022-06-14 LAB — TSI SER-ACNC: 5 TSI INDEX

## 2022-06-21 ENCOUNTER — TELEPHONE (OUTPATIENT)
Dept: OPHTHALMOLOGY | Facility: CLINIC | Age: 27
End: 2022-06-21
Payer: COMMERCIAL

## 2022-06-21 NOTE — TELEPHONE ENCOUNTER
Called patient to schedule procedure with Dr. Hernandez, there was no answer.  Left message with my direct line [phone].

## 2022-07-01 ENCOUNTER — TELEPHONE (OUTPATIENT)
Dept: OPHTHALMOLOGY | Facility: CLINIC | Age: 27
End: 2022-07-01

## 2022-07-01 ENCOUNTER — LAB (OUTPATIENT)
Dept: LAB | Facility: CLINIC | Age: 27
End: 2022-07-01
Payer: COMMERCIAL

## 2022-07-01 DIAGNOSIS — E05.90 HYPERTHYROIDISM: ICD-10-CM

## 2022-07-01 DIAGNOSIS — E05.00 GRAVES DISEASE: ICD-10-CM

## 2022-07-01 LAB
ALBUMIN SERPL BCG-MCNC: 4.7 G/DL (ref 3.5–5.2)
ALP SERPL-CCNC: 254 U/L (ref 35–104)
ALT SERPL W P-5'-P-CCNC: 32 U/L (ref 10–35)
AST SERPL W P-5'-P-CCNC: 32 U/L (ref 10–35)
BILIRUB DIRECT SERPL-MCNC: <0.2 MG/DL (ref 0–0.3)
BILIRUB SERPL-MCNC: 0.9 MG/DL
PROT SERPL-MCNC: 7.7 G/DL (ref 6.4–8.3)
T3 SERPL-MCNC: 288 NG/DL (ref 85–202)
T4 FREE SERPL-MCNC: 6.05 NG/DL (ref 0.9–1.7)

## 2022-07-01 PROCEDURE — 80076 HEPATIC FUNCTION PANEL: CPT

## 2022-07-01 PROCEDURE — 36415 COLL VENOUS BLD VENIPUNCTURE: CPT

## 2022-07-01 PROCEDURE — 84480 ASSAY TRIIODOTHYRONINE (T3): CPT

## 2022-07-01 PROCEDURE — 84439 ASSAY OF FREE THYROXINE: CPT

## 2022-07-01 NOTE — TELEPHONE ENCOUNTER
Called patient to schedule eye procedure with Dr. Hernandez.    Patient states she would like to wait it out until august.    Patient states she would like to be called in August sometime to schedule.    This writer will reach out to patient in August to try and schedule.    Meredith Flynn on 7/1/2022 at 4:17 PM

## 2022-07-06 ENCOUNTER — OFFICE VISIT (OUTPATIENT)
Dept: ENDOCRINOLOGY | Facility: CLINIC | Age: 27
End: 2022-07-06
Payer: COMMERCIAL

## 2022-07-06 VITALS — HEART RATE: 88 BPM | SYSTOLIC BLOOD PRESSURE: 128 MMHG | DIASTOLIC BLOOD PRESSURE: 78 MMHG

## 2022-07-06 DIAGNOSIS — E05.00 GRAVES DISEASE: ICD-10-CM

## 2022-07-06 DIAGNOSIS — E05.90 HYPERTHYROIDISM: ICD-10-CM

## 2022-07-06 PROCEDURE — 99215 OFFICE O/P EST HI 40 MIN: CPT | Performed by: INTERNAL MEDICINE

## 2022-07-06 RX ORDER — PROPRANOLOL HYDROCHLORIDE 160 MG/1
160 CAPSULE, EXTENDED RELEASE ORAL DAILY
Qty: 90 CAPSULE | Refills: 1 | Status: SHIPPED | OUTPATIENT
Start: 2022-07-06 | End: 2022-12-20

## 2022-07-06 RX ORDER — METHIMAZOLE 10 MG/1
20 TABLET ORAL 2 TIMES DAILY
Qty: 360 TABLET | Refills: 1 | Status: SHIPPED | OUTPATIENT
Start: 2022-07-06 | End: 2022-09-01

## 2022-07-06 NOTE — PROGRESS NOTES
ENDOCRINOLOGY FOLLOW-UP         HISTORY OF PRESENT ILLNESS    Yusuf Stubbs is seen in follow-up for the issues outlined below.     Patient had spontaneous vaginal delivery at 35 weeks on 2022.  After delivery, Ms. Stubbs was discharged on methimazole 10 mg twice daily and propranolol 160 mg daily.    She notes that her baby boy developed jaundice and hyperthyroidism: He has been treated with methimazole and is following with pediatric endocrinology at [facility].  He has been growing well and methimazole dose has been adjusted as needed.  She is using formula to feed.    She had to cancel her follow-up appointment with me on 6/10/2022.  Based on trend in thyroid function tests and risk for worsening of Graves' disease postpartum, I recommended an upward adjustment in methimazole dose to 20 mg twice daily.    She confirms that she has been taking methimazole 20 mg twice daily, although she just started this new dose about 1 week ago.  She continues on propranolol 160 mg daily.    Has been feeling drowsier after delivery.  No palpitations or tremors.  Has more frequent bowel movements and recently discontinued stool softeners.    Menstrual cycles have not recurred.  Not sexually active therefore not using contraception.  No plans for conception in the near term.    Taking prenatal vitamin but no extra hair skin and nail or biotin supplements.    She was seen in ophthalmology on 2022.  There were no signs of disease activity.  Given symptoms of tearing and blurry vision after focusing on objects, eyedrops were prescribed and patient was counseled on smoking cessation.  Lateral orbital decompression was also contemplated: The patient had the impression that careful follow-up was planned and states she did not expect a call to schedule surgery.  She would like to follow eye symptoms carefully, especially since she has a  baby along with her other children.    Patient has started smoking after  delivery, 2 cigarettes/day.  Tobacco cessation was discussed at her ophthalmology appointment and she notes that she has not been smoking since then.    Pertinent endocrine and related history:  1.  Hypothyroidism, due to Graves' disease.  Was initially diagnosed with hyperthyroidism secondary to Graves Disease in . She had been pregnant at the time. She was started on methimazole 15 mg daily. At the time of initial presentation she had been experiencing weight loss of 20 pounds, palpitations, heat intolerance and anxiety.   -She followed with endocrinology and was treated through another pregnancy in 2017.  She recalls that both of the children she had with hyperthyroidism during pregnancy ultimately needed treatment with methimazole in infancy.    -Radioactive iodine ablation was contemplated (initially deferred due to concern for worsening of thyroid eye disease) and ultimately pursued due to patient preference: Radioactive iodine uptake scan revealed an 80% uptake. She underwent radioactive iodine ablation on 2019 and received 14.6 mCi.   -She was last seen by Endocrinology at [facility] in 2019 by Dr. Sharma at which time the patient was prescribed methimazole (the medication was resumed due to persistent hyperthyroidism after radioiodine therapy).  -During most recent pregnancy, she was initially started on PTU 50 mg daily in 2021 (prescribed 2021) then transitioned to methimazole 5 mg TID on 22 at 16 weeks gestation.   2.  History of thyroid related eye disease.    PAST MEDICAL HISTORY  Past Medical History:   Diagnosis Date     Endocrine disease      Hypertension      Hyperthyroidism 2016     Precipitous delivery, delivered (current hospitalization) 2016      delivery 2016     Unspecified fetal growth retardation, unspecified (weight)      Urinary tract infection        MEDICATIONS  Current Outpatient Medications   Medication Sig Dispense Refill      acetaminophen (TYLENOL) 325 MG tablet Take 2 tablets (650 mg) by mouth every 4 hours as needed for mild pain or fever 100 tablet 0     docusate sodium (COLACE) 100 MG capsule Take 1 capsule (100 mg) by mouth daily 30 capsule 0     methimazole (TAPAZOLE) 10 MG tablet Take 2 tablets (20 mg) by mouth 2 times daily 360 tablet 1     Prenatal Vit-Fe Fumarate-FA (PRENATAL MULTIVITAMIN W/IRON) 27-0.8 MG tablet Take 1 tablet by mouth daily 90 tablet 3     propranolol ER (INDERAL LA) 160 MG 24 hr capsule Take 1 capsule (160 mg) by mouth daily 90 capsule 1       Allergies, family, and social history were reviewed and documented as needed in EHR.     REVIEW OF SYSTEMS  A focused ROS was performed, with pertinent positives and negatives as noted in the HPI.    PHYSICAL EXAM  /78 (BP Location: Right arm, Patient Position: Sitting, Cuff Size: Adult Regular)   Pulse 88   LMP 09/14/2021 (Exact Date)   There is no height or weight on file to calculate BMI.  Constitutional: Vital signs reviewed, as recorded above. Patient is alert, oriented and appears in no acute distress.  Eyes: Bilateral stare without lid lag or retraction, no conjunctival injection.    Neck: Neck supple, thyroid is at least 2 times the size of normal, without palpable nodules.  Some tenderness on exam.  Bruit ? on auscultation.    Cardiovascular: Tachycardic, regular rhythm, normal S1/S2, trace extremity edema.  Respiratory: CTAB, without wheezes, crackles or rhonchi; normal chest wall motion and respiratory effort.  MSK: No clubbing or cyanosis; normal muscle bulk and tone.  Skin: Normal skin color, temperature, turgor and texture.  Neurological: Alert and oriented times 3. No tremor.    DATA REVIEW  Each of the following laboratory and/or imaging studies were reviewed.    Lab on 07/01/2022   Component Date Value Ref Range Status     Free T4 07/01/2022 6.05 (A) 0.90 - 1.70 ng/dL Final     T3 Total 07/01/2022 288 (A) 85 - 202 ng/dL Final     Protein Total  07/01/2022 7.7  6.4 - 8.3 g/dL Final     Albumin 07/01/2022 4.7  3.5 - 5.2 g/dL Final     Bilirubin Total 07/01/2022 0.9  <=1.2 mg/dL Final     Alkaline Phosphatase 07/01/2022 254 (A) 35 - 104 U/L Final     AST 07/01/2022 32  10 - 35 U/L Final     ALT 07/01/2022 32  10 - 35 U/L Final     Bilirubin Direct 07/01/2022 <0.20  0.00 - 0.30 mg/dL Final         ASSESSMENT  1.  Hyperthyroidism.  Due to Graves' disease.  Marked hyperthyroidism, although does not have as marked symptoms of thyrotoxicosis.  We had planned to adjust methimazole dose about 1 month ago but she just adjusted her dose about 1 week ago: We will therefore continue her current dosage without changes and reevaluate thyroid function tests in another 2 to 3 weeks.  We discussed importance of continuing higher dose of methimazole to ensure improvement in thyroid function tests; would also continue propranolol without changes.  Finally, we discussed importance of avoiding pregnancy while on methimazole and preconception planning to transition to an alternate agent if she is considering pregnancy in the near term; she plans to start contraception and has no plans for pregnancy in the immediate future.    2.  Graves' disease.  Seen in ophthalmology.  Reasonable to defer surgery in the short-term given other family responsibilities vying for her attention.  However, we did discuss importance of continuing to follow in ophthalmology.    3.  History of tobacco use.  Has stopped tobacco use altogether (was using a few cigarettes per day) since ophthalmology visit.  Congratulated on this change.    PLAN  -Continue methimazole 20 mg (TWO 10 mg tabs) twice a day  -Continue propranolol 160 mg once daily  -Labs on 7/25/22  -Return for a follow-up visit in 2 months  -We will communicate results via [employer], or if needed by phone      Orders Placed This Encounter   Procedures     TSH     T4 free     T3 total     Hepatic function panel       I spent a total of 49 minutes  on the date of encounter reviewing medical records, evaluating the patient, coordinating care and documenting in the EHR, as detailed above.      Matthew Echols MD   Division of Diabetes, Endocrinology and Metabolism  Department of Medicine

## 2022-07-06 NOTE — PATIENT INSTRUCTIONS
-Continue methimazole 20 mg (TWO 10 mg tabs) twice a day  -Continue propranolol 160 mg once daily  -Labs on 7/25/22  -Return for a follow-up visit in 2 months  -We will communicate results via [employer], or if needed by phone

## 2022-07-06 NOTE — LETTER
7/6/2022         RE: Yusuf Stubbs  [address] [place] [address]        Dear Colleague,    Thank you for referring your patient, Yusuf Stubbs, to the [facility]. Please see a copy of my visit note below.      ENDOCRINOLOGY FOLLOW-UP         HISTORY OF PRESENT ILLNESS    Yusuf Stubbs is seen in follow-up for the issues outlined below.     Patient had spontaneous vaginal delivery at 35 weeks on 5/24/2022.  After delivery, Ms. Stubbs was discharged on methimazole 10 mg twice daily and propranolol 160 mg daily.    She notes that her baby boy developed jaundice and hyperthyroidism: He has been treated with methimazole and is following with pediatric endocrinology at [facility].  He has been growing well and methimazole dose has been adjusted as needed.  She is using formula to feed.    She had to cancel her follow-up appointment with me on 6/10/2022.  Based on trend in thyroid function tests and risk for worsening of Graves' disease postpartum, I recommended an upward adjustment in methimazole dose to 20 mg twice daily.    She confirms that she has been taking methimazole 20 mg twice daily, although she just started this new dose about 1 week ago.  She continues on propranolol 160 mg daily.    Has been feeling drowsier after delivery.  No palpitations or tremors.  Has more frequent bowel movements and recently discontinued stool softeners.    Menstrual cycles have not recurred.  Not sexually active therefore not using contraception.  No plans for conception in the near term.    Taking prenatal vitamin but no extra hair skin and nail or biotin supplements.    She was seen in ophthalmology on 6/7/2022.  There were no signs of disease activity.  Given symptoms of tearing and blurry vision after focusing on objects, eyedrops were prescribed and patient was counseled on smoking cessation.  Lateral orbital decompression was also contemplated: The patient had the impression that careful  follow-up was planned and states she did not expect a call to schedule surgery.  She would like to follow eye symptoms carefully, especially since she has a  baby along with her other children.    Patient has started smoking after delivery, 2 cigarettes/day.  Tobacco cessation was discussed at her ophthalmology appointment and she notes that she has not been smoking since then.    Pertinent endocrine and related history:  1.  Hypothyroidism, due to Graves' disease.  Was initially diagnosed with hyperthyroidism secondary to Graves Disease in 2016. She had been pregnant at the time. She was started on methimazole 15 mg daily. At the time of initial presentation she had been experiencing weight loss of 20 pounds, palpitations, heat intolerance and anxiety.   -She followed with endocrinology and was treated through another pregnancy in 2017.  She recalls that both of the children she had with hyperthyroidism during pregnancy ultimately needed treatment with methimazole in infancy.    -Radioactive iodine ablation was contemplated (initially deferred due to concern for worsening of thyroid eye disease) and ultimately pursued due to patient preference: Radioactive iodine uptake scan revealed an 80% uptake. She underwent radioactive iodine ablation on 2019 and received 14.6 mCi.   -She was last seen by Endocrinology at [facility] in 2019 by Dr. Sharma at which time the patient was prescribed methimazole (the medication was resumed due to persistent hyperthyroidism after radioiodine therapy).  -During most recent pregnancy, she was initially started on PTU 50 mg daily in 2021 (prescribed 2021) then transitioned to methimazole 5 mg TID on 22 at 16 weeks gestation.   2.  History of thyroid related eye disease.    PAST MEDICAL HISTORY  Past Medical History:   Diagnosis Date     Endocrine disease      Hypertension      Hyperthyroidism 2016     Precipitous delivery, delivered (current  hospitalization) 2016      delivery 2016     Unspecified fetal growth retardation, unspecified (weight) 2013     Urinary tract infection        MEDICATIONS  Current Outpatient Medications   Medication Sig Dispense Refill     acetaminophen (TYLENOL) 325 MG tablet Take 2 tablets (650 mg) by mouth every 4 hours as needed for mild pain or fever 100 tablet 0     docusate sodium (COLACE) 100 MG capsule Take 1 capsule (100 mg) by mouth daily 30 capsule 0     methimazole (TAPAZOLE) 10 MG tablet Take 2 tablets (20 mg) by mouth 2 times daily 360 tablet 1     Prenatal Vit-Fe Fumarate-FA (PRENATAL MULTIVITAMIN W/IRON) 27-0.8 MG tablet Take 1 tablet by mouth daily 90 tablet 3     propranolol ER (INDERAL LA) 160 MG 24 hr capsule Take 1 capsule (160 mg) by mouth daily 90 capsule 1       Allergies, family, and social history were reviewed and documented as needed in EHR.     REVIEW OF SYSTEMS  A focused ROS was performed, with pertinent positives and negatives as noted in the HPI.    PHYSICAL EXAM  /78 (BP Location: Right arm, Patient Position: Sitting, Cuff Size: Adult Regular)   Pulse 88   LMP 2021 (Exact Date)   There is no height or weight on file to calculate BMI.  Constitutional: Vital signs reviewed, as recorded above. Patient is alert, oriented and appears in no acute distress.  Eyes: Bilateral stare without lid lag or retraction, no conjunctival injection.    Neck: Neck supple, thyroid is at least 2 times the size of normal, without palpable nodules.  Some tenderness on exam.  Bruit ? on auscultation.    Cardiovascular: Tachycardic, regular rhythm, normal S1/S2, trace extremity edema.  Respiratory: CTAB, without wheezes, crackles or rhonchi; normal chest wall motion and respiratory effort.  MSK: No clubbing or cyanosis; normal muscle bulk and tone.  Skin: Normal skin color, temperature, turgor and texture.  Neurological: Alert and oriented times 3. No tremor.    DATA REVIEW  Each of the  following laboratory and/or imaging studies were reviewed.    Lab on 07/01/2022   Component Date Value Ref Range Status     Free T4 07/01/2022 6.05 (A) 0.90 - 1.70 ng/dL Final     T3 Total 07/01/2022 288 (A) 85 - 202 ng/dL Final     Protein Total 07/01/2022 7.7  6.4 - 8.3 g/dL Final     Albumin 07/01/2022 4.7  3.5 - 5.2 g/dL Final     Bilirubin Total 07/01/2022 0.9  <=1.2 mg/dL Final     Alkaline Phosphatase 07/01/2022 254 (A) 35 - 104 U/L Final     AST 07/01/2022 32  10 - 35 U/L Final     ALT 07/01/2022 32  10 - 35 U/L Final     Bilirubin Direct 07/01/2022 <0.20  0.00 - 0.30 mg/dL Final         ASSESSMENT  1.  Hyperthyroidism.  Due to Graves' disease.  Marked hyperthyroidism, although does not have as marked symptoms of thyrotoxicosis.  We had planned to adjust methimazole dose about 1 month ago but she just adjusted her dose about 1 week ago: We will therefore continue her current dosage without changes and reevaluate thyroid function tests in another 2 to 3 weeks.  We discussed importance of continuing higher dose of methimazole to ensure improvement in thyroid function tests; would also continue propranolol without changes.  Finally, we discussed importance of avoiding pregnancy while on methimazole and preconception planning to transition to an alternate agent if she is considering pregnancy in the near term; she plans to start contraception and has no plans for pregnancy in the immediate future.    2.  Graves' disease.  Seen in ophthalmology.  Reasonable to defer surgery in the short-term given other family responsibilities vying for her attention.  However, we did discuss importance of continuing to follow in ophthalmology.    3.  History of tobacco use.  Has stopped tobacco use altogether (was using a few cigarettes per day) since ophthalmology visit.  Congratulated on this change.    PLAN  -Continue methimazole 20 mg (TWO 10 mg tabs) twice a day  -Continue propranolol 160 mg once daily  -Labs on  7/25/22  -Return for a follow-up visit in 2 months  -We will communicate results via MyChart, or if needed by phone      Orders Placed This Encounter   Procedures     TSH     T4 free     T3 total     Hepatic function panel       I spent a total of 49 minutes on the date of encounter reviewing medical records, evaluating the patient, coordinating care and documenting in the EHR, as detailed above.      Cortez Echols MD   Division of Diabetes, Endocrinology and Metabolism  Department of Medicine              Again, thank you for allowing me to participate in the care of your patient.        Sincerely,        CORTEZ Echols MD

## 2022-07-21 ENCOUNTER — LAB (OUTPATIENT)
Dept: LAB | Facility: CLINIC | Age: 27
End: 2022-07-21
Payer: COMMERCIAL

## 2022-07-21 DIAGNOSIS — E05.00 GRAVES DISEASE: ICD-10-CM

## 2022-07-21 DIAGNOSIS — E05.90 HYPERTHYROIDISM: ICD-10-CM

## 2022-07-21 LAB
ALBUMIN SERPL BCG-MCNC: 4.2 G/DL (ref 3.5–5.2)
ALP SERPL-CCNC: 177 U/L (ref 35–104)
ALT SERPL W P-5'-P-CCNC: 18 U/L (ref 10–35)
AST SERPL W P-5'-P-CCNC: 27 U/L (ref 10–35)
BILIRUB DIRECT SERPL-MCNC: <0.2 MG/DL (ref 0–0.3)
BILIRUB SERPL-MCNC: 0.8 MG/DL
PROT SERPL-MCNC: 7.4 G/DL (ref 6.4–8.3)
T3 SERPL-MCNC: 255 NG/DL (ref 85–202)
T4 FREE SERPL-MCNC: 4.4 NG/DL (ref 0.9–1.7)

## 2022-07-21 PROCEDURE — 84480 ASSAY TRIIODOTHYRONINE (T3): CPT

## 2022-07-21 PROCEDURE — 36415 COLL VENOUS BLD VENIPUNCTURE: CPT

## 2022-07-21 PROCEDURE — 80076 HEPATIC FUNCTION PANEL: CPT

## 2022-07-21 PROCEDURE — 84443 ASSAY THYROID STIM HORMONE: CPT

## 2022-07-21 PROCEDURE — 84439 ASSAY OF FREE THYROXINE: CPT

## 2022-08-06 ENCOUNTER — HEALTH MAINTENANCE LETTER (OUTPATIENT)
Age: 27
End: 2022-08-06

## 2022-08-24 ENCOUNTER — LAB (OUTPATIENT)
Dept: LAB | Facility: CLINIC | Age: 27
End: 2022-08-24
Payer: COMMERCIAL

## 2022-08-24 DIAGNOSIS — E05.00 GRAVES DISEASE: ICD-10-CM

## 2022-08-24 DIAGNOSIS — E05.90 HYPERTHYROIDISM: ICD-10-CM

## 2022-08-24 LAB
ALBUMIN SERPL BCG-MCNC: 4.1 G/DL (ref 3.5–5.2)
ALP SERPL-CCNC: 174 U/L (ref 35–104)
ALT SERPL W P-5'-P-CCNC: 14 U/L (ref 10–35)
AST SERPL W P-5'-P-CCNC: 31 U/L (ref 10–35)
BILIRUB DIRECT SERPL-MCNC: <0.2 MG/DL (ref 0–0.3)
BILIRUB SERPL-MCNC: 0.6 MG/DL
PROT SERPL-MCNC: 7.3 G/DL (ref 6.4–8.3)
T3 SERPL-MCNC: 238 NG/DL (ref 85–202)
T4 FREE SERPL-MCNC: 4.08 NG/DL (ref 0.9–1.7)

## 2022-08-24 PROCEDURE — 36415 COLL VENOUS BLD VENIPUNCTURE: CPT

## 2022-08-24 PROCEDURE — 84443 ASSAY THYROID STIM HORMONE: CPT

## 2022-08-24 PROCEDURE — 84480 ASSAY TRIIODOTHYRONINE (T3): CPT

## 2022-08-24 PROCEDURE — 84439 ASSAY OF FREE THYROXINE: CPT

## 2022-08-24 PROCEDURE — 80076 HEPATIC FUNCTION PANEL: CPT

## 2022-09-01 ENCOUNTER — MYC MEDICAL ADVICE (OUTPATIENT)
Dept: ENDOCRINOLOGY | Facility: CLINIC | Age: 27
End: 2022-09-01

## 2022-09-01 DIAGNOSIS — E05.90 HYPERTHYROIDISM: Primary | ICD-10-CM

## 2022-09-01 DIAGNOSIS — E05.00 GRAVES DISEASE: ICD-10-CM

## 2022-09-01 RX ORDER — METHIMAZOLE 10 MG/1
20 TABLET ORAL 3 TIMES DAILY
Qty: 180 TABLET | Refills: 2 | Status: SHIPPED | OUTPATIENT
Start: 2022-09-01 | End: 2022-12-20

## 2022-10-22 ENCOUNTER — HEALTH MAINTENANCE LETTER (OUTPATIENT)
Age: 27
End: 2022-10-22

## 2022-12-09 ENCOUNTER — LAB (OUTPATIENT)
Dept: LAB | Facility: CLINIC | Age: 27
End: 2022-12-09
Payer: COMMERCIAL

## 2022-12-09 DIAGNOSIS — E05.90 HYPERTHYROIDISM: ICD-10-CM

## 2022-12-09 LAB
ALBUMIN SERPL BCG-MCNC: 4.1 G/DL (ref 3.5–5.2)
ALP SERPL-CCNC: 204 U/L (ref 35–104)
ALT SERPL W P-5'-P-CCNC: 12 U/L (ref 10–35)
AST SERPL W P-5'-P-CCNC: 22 U/L (ref 10–35)
BILIRUB DIRECT SERPL-MCNC: <0.2 MG/DL (ref 0–0.3)
BILIRUB SERPL-MCNC: 0.7 MG/DL
PROT SERPL-MCNC: 7.3 G/DL (ref 6.4–8.3)
T3 SERPL-MCNC: 390 NG/DL (ref 85–202)
T4 FREE SERPL-MCNC: 6.05 NG/DL (ref 0.9–1.7)
TSH SERPL DL<=0.005 MIU/L-ACNC: <0.01 UIU/ML (ref 0.3–4.2)

## 2022-12-09 PROCEDURE — 84443 ASSAY THYROID STIM HORMONE: CPT

## 2022-12-09 PROCEDURE — 80076 HEPATIC FUNCTION PANEL: CPT

## 2022-12-09 PROCEDURE — 84480 ASSAY TRIIODOTHYRONINE (T3): CPT

## 2022-12-09 PROCEDURE — 36415 COLL VENOUS BLD VENIPUNCTURE: CPT

## 2022-12-09 PROCEDURE — 84439 ASSAY OF FREE THYROXINE: CPT

## 2022-12-15 ENCOUNTER — MYC MEDICAL ADVICE (OUTPATIENT)
Dept: FAMILY MEDICINE | Facility: CLINIC | Age: 27
End: 2022-12-15

## 2022-12-16 NOTE — TELEPHONE ENCOUNTER
Patient's mother unable to find Similac Neosure formulas anywhere.   Patient's mother requesting formula change to En[employer] formula. [facility] staff recommend to switch over to [employer] .    Message sent to [facility], Dr Brush as Dr White not in clinic today.    Yi Álvarez RN  [facility]      Storage of formula   12/15/2022 10:36 PM Reply    To: [facility] FAMILY MEDICINE/OB SUPPORT POOL      From: Yusuf Stubbs      Created: 12/15/2022 10:36 PM        Hi, I couldn't find any of the Similac Neosure formulas anywhere.  I even went out of city to look for it but no luck at all.  The only places that still have the formulas in stock are the stores that doesn't approve [facility].  I talked to the [facility] staff and the recommend to switch over to En[employer]are but I need Dr. White to approve for the switch over.

## 2022-12-20 DIAGNOSIS — E05.90 HYPERTHYROIDISM: Primary | ICD-10-CM

## 2022-12-20 DIAGNOSIS — E05.00 GRAVES DISEASE: ICD-10-CM

## 2022-12-20 RX ORDER — METHIMAZOLE 10 MG/1
20 TABLET ORAL 3 TIMES DAILY
Qty: 180 TABLET | Refills: 2 | Status: SHIPPED | OUTPATIENT
Start: 2022-12-20 | End: 2023-04-21 | Stop reason: DRUGHIGH

## 2022-12-20 RX ORDER — PROPRANOLOL HYDROCHLORIDE 160 MG/1
160 CAPSULE, EXTENDED RELEASE ORAL DAILY
Qty: 30 CAPSULE | Refills: 2 | Status: SHIPPED | OUTPATIENT
Start: 2022-12-20 | End: 2023-06-20

## 2022-12-26 NOTE — TELEPHONE ENCOUNTER
RN called mom to get patient's child's information and route the message to patient's child's PCP.            Masha King RN  [facility]

## 2023-01-20 ENCOUNTER — MYC MEDICAL ADVICE (OUTPATIENT)
Dept: ENDOCRINOLOGY | Facility: CLINIC | Age: 28
End: 2023-01-20
Payer: COMMERCIAL

## 2023-01-20 ENCOUNTER — TELEPHONE (OUTPATIENT)
Dept: FAMILY MEDICINE | Facility: CLINIC | Age: 28
End: 2023-01-20
Payer: COMMERCIAL

## 2023-01-20 DIAGNOSIS — E05.90 HYPERTHYROIDISM: Primary | ICD-10-CM

## 2023-01-20 NOTE — TELEPHONE ENCOUNTER
Reason for Call:  Other appointment    Detailed comments: positive home pregnancy test. Needs first ob scheduled. Please call patient     Phone Number Patient can be reached at: Cell number on file:    Telephone Information:   Mobile [phone]       Best Time: any     Can we leave a detailed message on this number? YES    Call taken on 1/20/2023 at 3:17 PM by Sherine Reynoso

## 2023-01-23 NOTE — TELEPHONE ENCOUNTER
Endocrine team, please disregard my earlier message: No need to call patient.  The patient read my chart message.  I have updated her with the additional recommendations I have via [employer].    Matthew Echols MD 1/23/2023 9:34 AM    
Endocrine team, would you please reach out to patient with the following message (it does not look like she has yet to read message from Dr. Tamez in [facility]):  -Thank you for updating us: Please stop methimazole now if suspecting pregnancy  -Have ordered blood test to confirm pregnancy, in addition to thyroid function tests: Please have these drawn as soon as possible  -Based on results, we will decide whether to switch from methimazole to an alternate medication (PTU)   
Detail Level: Detailed
Quality 110: Preventive Care And Screening: Influenza Immunization: Influenza Immunization not Administered because Patient Refused.

## 2023-02-01 ENCOUNTER — LAB (OUTPATIENT)
Dept: LAB | Facility: CLINIC | Age: 28
End: 2023-02-01
Payer: COMMERCIAL

## 2023-02-01 DIAGNOSIS — E05.90 HYPERTHYROIDISM: ICD-10-CM

## 2023-02-01 LAB
ALBUMIN SERPL BCG-MCNC: 4.1 G/DL (ref 3.5–5.2)
ALP SERPL-CCNC: 182 U/L (ref 35–104)
ALT SERPL W P-5'-P-CCNC: 8 U/L (ref 10–35)
AST SERPL W P-5'-P-CCNC: 20 U/L (ref 10–35)
BILIRUB DIRECT SERPL-MCNC: <0.2 MG/DL (ref 0–0.3)
BILIRUB SERPL-MCNC: 0.6 MG/DL
HCG INTACT+B SERPL-ACNC: ABNORMAL MIU/ML
PROT SERPL-MCNC: 6.9 G/DL (ref 6.4–8.3)
T3 SERPL-MCNC: 383 NG/DL (ref 85–202)
T4 FREE SERPL-MCNC: 5.45 NG/DL (ref 0.9–1.7)
T4 SERPL-MCNC: 18 UG/DL (ref 4.5–11.7)
TSH SERPL DL<=0.005 MIU/L-ACNC: <0.01 UIU/ML (ref 0.3–4.2)

## 2023-02-01 PROCEDURE — 80076 HEPATIC FUNCTION PANEL: CPT

## 2023-02-01 PROCEDURE — 84436 ASSAY OF TOTAL THYROXINE: CPT

## 2023-02-01 PROCEDURE — 84439 ASSAY OF FREE THYROXINE: CPT

## 2023-02-01 PROCEDURE — 36415 COLL VENOUS BLD VENIPUNCTURE: CPT

## 2023-02-01 PROCEDURE — 84480 ASSAY TRIIODOTHYRONINE (T3): CPT

## 2023-02-01 PROCEDURE — 84702 CHORIONIC GONADOTROPIN TEST: CPT

## 2023-02-01 PROCEDURE — 84443 ASSAY THYROID STIM HORMONE: CPT

## 2023-02-17 ENCOUNTER — LAB (OUTPATIENT)
Dept: LAB | Facility: CLINIC | Age: 28
End: 2023-02-17
Payer: COMMERCIAL

## 2023-02-17 ENCOUNTER — OFFICE VISIT (OUTPATIENT)
Dept: ENDOCRINOLOGY | Facility: CLINIC | Age: 28
End: 2023-02-17
Payer: COMMERCIAL

## 2023-02-17 VITALS
HEART RATE: 72 BPM | WEIGHT: 104 LBS | BODY MASS INDEX: 22.51 KG/M2 | DIASTOLIC BLOOD PRESSURE: 68 MMHG | SYSTOLIC BLOOD PRESSURE: 122 MMHG

## 2023-02-17 DIAGNOSIS — E05.00 GRAVES DISEASE: ICD-10-CM

## 2023-02-17 DIAGNOSIS — E05.90 HYPERTHYROIDISM: Primary | ICD-10-CM

## 2023-02-17 DIAGNOSIS — E05.90 HYPERTHYROIDISM: ICD-10-CM

## 2023-02-17 LAB
ATRIAL RATE - MUSE: 88 BPM
DIASTOLIC BLOOD PRESSURE - MUSE: NORMAL MMHG
INTERPRETATION ECG - MUSE: NORMAL
P AXIS - MUSE: 27 DEGREES
PR INTERVAL - MUSE: 160 MS
QRS DURATION - MUSE: 86 MS
QT - MUSE: 394 MS
QTC - MUSE: 476 MS
R AXIS - MUSE: 20 DEGREES
SYSTOLIC BLOOD PRESSURE - MUSE: NORMAL MMHG
T AXIS - MUSE: 14 DEGREES
T3 SERPL-MCNC: 402 NG/DL (ref 85–202)
T4 SERPL-MCNC: 17.7 UG/DL (ref 4.5–11.7)
TSH SERPL DL<=0.005 MIU/L-ACNC: <0.01 UIU/ML (ref 0.3–4.2)
VENTRICULAR RATE- MUSE: 88 BPM

## 2023-02-17 PROCEDURE — 93005 ELECTROCARDIOGRAM TRACING: CPT | Performed by: INTERNAL MEDICINE

## 2023-02-17 PROCEDURE — 84443 ASSAY THYROID STIM HORMONE: CPT

## 2023-02-17 PROCEDURE — 99214 OFFICE O/P EST MOD 30 MIN: CPT | Performed by: INTERNAL MEDICINE

## 2023-02-17 PROCEDURE — 93010 ELECTROCARDIOGRAM REPORT: CPT | Performed by: INTERNAL MEDICINE

## 2023-02-17 PROCEDURE — 36415 COLL VENOUS BLD VENIPUNCTURE: CPT

## 2023-02-17 PROCEDURE — 84436 ASSAY OF TOTAL THYROXINE: CPT

## 2023-02-17 PROCEDURE — 84480 ASSAY TRIIODOTHYRONINE (T3): CPT

## 2023-02-17 NOTE — PATIENT INSTRUCTIONS
-Labs today  -ECG today  -For now, can remain off medication for hyperthyroidism; based on results, we will decide whether to start treatment  -Continue propranolol 160 mg once daily  -Return for a follow-up visit in 1 month  -We will communicate results via [employer], or if needed by phone

## 2023-02-17 NOTE — LETTER
2/17/2023         RE: Yusuf Stubbs  [address] [place] [address]        Dear Colleague,    Thank you for referring your patient, Yusuf Stubbs, to the [facility]. Please see a copy of my visit note below.      ENDOCRINOLOGY FOLLOW-UP         HISTORY OF PRESENT ILLNESS    Yusuf Stubbs is seen in follow-up for the issues outlined below.     When I last saw patient in 7/2022, she had delivered her baby boy on 5/24/2022 and had been discharged from hospital on methimazole and propranolol.  Her baby boy developed jaundice and hyperthyroidism and required treatment with methimazole.    She notes today that her son only needed treatment with methimazole for about 4 months.  Now off treatment and growing well, no active medical issues.    We had discussed importance of avoiding pregnancy at that visit, however patient updated me to report pregnancy in 1/2023.  We discontinued methimazole and recheck thyroid function tests.  Since hyperthyroidism was within acceptable range for pregnancy, we deferred starting PTU.    She remains off methimazole.  She is taking propranolol extended release 160 mg daily.    Has noted more headaches/migraines since discovery of pregnancy.  No palpitations or tremors.  Feels colder overnight, warmer during the daytime.    She had some sore throat with swallowing food recently, this has improved.  No anterior neck discomfort, hoarseness, or stridor.    Has quit tobacco altogether.  Managing her stress by video rahul, which she finds really enjoyable.    No conjunctival injection, eye discomfort/foreign body sensation, or diplopia.    Pertinent endocrine and related history:  1.  Hypothyroidism, due to Graves' disease.  Was initially diagnosed with hyperthyroidism secondary to Graves Disease in 2016. She had been pregnant at the time. She was started on methimazole 15 mg daily. At the time of initial presentation she had been experiencing weight loss of 20  pounds, palpitations, heat intolerance and anxiety.   -She followed with endocrinology and was treated through another pregnancy in 2017.  She recalls that both of the children she had with hyperthyroidism during pregnancy ultimately needed treatment with methimazole in infancy.    -Radioactive iodine ablation was contemplated (initially deferred due to concern for worsening of thyroid eye disease) and ultimately pursued due to patient preference: Radioactive iodine uptake scan revealed an 80% uptake. She underwent radioactive iodine ablation on 2019 and received 14.6 mCi.   -She was last seen by Endocrinology at [facility] in 2019 by Dr. Sharma at which time the patient was prescribed methimazole (the medication was resumed due to persistent hyperthyroidism after radioiodine therapy).  -During most recent pregnancy, she was initially started on PTU 50 mg daily in 2021 (prescribed 2021) then transitioned to methimazole 5 mg TID on 22 at 16 weeks gestation.   2.  History of thyroid related eye disease.    PAST MEDICAL HISTORY  Past Medical History:   Diagnosis Date     Endocrine disease      Hypertension      Hyperthyroidism 2016     Precipitous delivery, delivered (current hospitalization) 2016      delivery 2016     Unspecified fetal growth retardation, unspecified (weight)      Urinary tract infection        MEDICATIONS  Current Outpatient Medications   Medication Sig Dispense Refill     acetaminophen (TYLENOL) 325 MG tablet Take 2 tablets (650 mg) by mouth every 4 hours as needed for mild pain or fever 100 tablet 0     docusate sodium (COLACE) 100 MG capsule Take 1 capsule (100 mg) by mouth daily 30 capsule 0     methimazole (TAPAZOLE) 10 MG tablet Take 2 tablets (20 mg) by mouth 3 times daily 180 tablet 2     Prenatal Vit-Fe Fumarate-FA (PRENATAL MULTIVITAMIN W/IRON) 27-0.8 MG tablet Take 1 tablet by mouth daily 90 tablet 3     propranolol ER (INDERAL LA) 160 MG 24 hr  capsule Take 1 capsule (160 mg) by mouth daily 30 capsule 2       Allergies, family, and social history were reviewed and documented as needed in EHR.     REVIEW OF SYSTEMS  A focused ROS was performed, with pertinent positives and negatives as noted in the HPI.    PHYSICAL EXAM  /68 (BP Location: Right arm, Patient Position: Sitting, Cuff Size: Adult Regular)   Pulse 72   Wt 47.2 kg (104 lb)   BMI 22.51 kg/m    Body mass index is 22.51 kg/m .  Constitutional: Vital signs reviewed, as recorded above. Patient is alert, oriented and appears in no acute distress.  Eyes: Mild prominence of the eyes, otherwise no stare or lid lag or retraction, no conjunctival injection.    Neck: Neck supple, thyroid is about 1.5 times the size of normal, without palpable nodules.  Some tenderness on exam.    Cardiovascular: Irregular rhythm, normal rate, no lower extremity edema.  Respiratory: CTAB, without wheezes, crackles or rhonchi; normal chest wall motion and respiratory effort.  MSK: No clubbing or cyanosis; normal muscle bulk and tone.  Skin: Normal skin color, temperature, turgor and texture.  Neurological: Alert and oriented times 3. No tremor.    DATA REVIEW  Each of the following laboratory and/or imaging studies were reviewed.    Component      Latest Ref Rng & Units 2/1/2023   Protein Total      6.4 - 8.3 g/dL 6.9   Albumin      3.5 - 5.2 g/dL 4.1   Bilirubin Total      <=1.2 mg/dL 0.6   Alkaline Phosphatase      35 - 104 U/L 182 (H)   AST      10 - 35 U/L 20   ALT      10 - 35 U/L 8 (L)   Bilirubin Direct      0.00 - 0.30 mg/dL <0.20   TSH      0.30 - 4.20 uIU/mL <0.01 (L)   Triiodothyronine (T3)      85 - 202 ng/dL 383 (H)   T4 Total      4.5 - 11.7 ug/dL 18.0 (H)   T4 Free      0.90 - 1.70 ng/dL 5.45 (H)   hCG Quantitative      <5 mIU/mL 136,921 (H)       ASSESSMENT  1.  Hyperthyroidism.  Due to Graves' disease.  Labs drawn earlier this month showed total T4 and total T3 levels were acceptable for pregnancy;  would recheck thyroid function tests today as patient did not have a chance to complete previsit labs.  Based on those results, as well as the results of EKG, can determine whether to consider pharmacologic therapy (since we are uncertain of dates, would consider using PTU at least for the short-term).  We discussed management of hyperthyroidism over the course of pregnancy as well as choice of medications.    2.  Graves' disease.  Seen in ophthalmology.  Lateral orbital decompression was contemplated but the patient had deferred surgery for the short-term.  No active eye symptoms at present.  Follow-up.    3.  History of tobacco use.  Has quit tobacco use.  Congratulated on this.    4.  Irregular rhythm.  Irregular heart rhythm, although not clearly irregularly irregular.  Would obtain EKG today, especially in light of coexisting hyperthyroidism and risk of arrhythmia.    5.  Pregnancy.  Address thyroid status as above.  Have asked patient to start prenatal vitamin.  Anticipating follow-up with her primary care clinic for obstetric care.    PLAN  -Labs today  -ECG today  -For now, can remain off medication for hyperthyroidism; based on results, we will decide whether to start treatment  -Continue propranolol 160 mg once daily  -Return for a follow-up visit in 1 month  -We will communicate results via Socratic, or if needed by phone    Orders Placed This Encounter   Procedures     EKG 12-lead complete w/read - Clinics       I spent a total of 36 minutes on the date of encounter reviewing medical records, evaluating the patient, coordinating care and documenting in the EHR, as detailed above.      Cortez Echols MD   Division of Diabetes, Endocrinology and Metabolism  Department of Medicine              Again, thank you for allowing me to participate in the care of your patient.        Sincerely,        CORTEZ Echols MD

## 2023-02-17 NOTE — PROGRESS NOTES
ENDOCRINOLOGY FOLLOW-UP         HISTORY OF PRESENT ILLNESS    Yusuf Stubbs is seen in follow-up for the issues outlined below.     When I last saw patient in 7/2022, she had delivered her baby boy on 5/24/2022 and had been discharged from hospital on methimazole and propranolol.  Her baby boy developed jaundice and hyperthyroidism and required treatment with methimazole.    She notes today that her son only needed treatment with methimazole for about 4 months.  Now off treatment and growing well, no active medical issues.    We had discussed importance of avoiding pregnancy at that visit, however patient updated me to report pregnancy in 1/2023.  We discontinued methimazole and recheck thyroid function tests.  Since hyperthyroidism was within acceptable range for pregnancy, we deferred starting PTU.    She remains off methimazole.  She is taking propranolol extended release 160 mg daily.    Has noted more headaches/migraines since discovery of pregnancy.  No palpitations or tremors.  Feels colder overnight, warmer during the daytime.    She had some sore throat with swallowing food recently, this has improved.  No anterior neck discomfort, hoarseness, or stridor.    Has quit tobacco altogether.  Managing her stress by video rahul, which she finds really enjoyable.    No conjunctival injection, eye discomfort/foreign body sensation, or diplopia.    Pertinent endocrine and related history:  1.  Hypothyroidism, due to Graves' disease.  Was initially diagnosed with hyperthyroidism secondary to Graves Disease in 2016. She had been pregnant at the time. She was started on methimazole 15 mg daily. At the time of initial presentation she had been experiencing weight loss of 20 pounds, palpitations, heat intolerance and anxiety.   -She followed with endocrinology and was treated through another pregnancy in 2017.  She recalls that both of the children she had with hyperthyroidism during pregnancy ultimately needed treatment  with methimazole in infancy.    -Radioactive iodine ablation was contemplated (initially deferred due to concern for worsening of thyroid eye disease) and ultimately pursued due to patient preference: Radioactive iodine uptake scan revealed an 80% uptake. She underwent radioactive iodine ablation on 2019 and received 14.6 mCi.   -She was last seen by Endocrinology at [facility] in 2019 by Dr. Sharma at which time the patient was prescribed methimazole (the medication was resumed due to persistent hyperthyroidism after radioiodine therapy).  -During most recent pregnancy, she was initially started on PTU 50 mg daily in 2021 (prescribed 2021) then transitioned to methimazole 5 mg TID on 22 at 16 weeks gestation.   2.  History of thyroid related eye disease.    PAST MEDICAL HISTORY  Past Medical History:   Diagnosis Date     Endocrine disease      Hypertension      Hyperthyroidism 2016     Precipitous delivery, delivered (current hospitalization) 2016      delivery 2016     Unspecified fetal growth retardation, unspecified (weight)      Urinary tract infection        MEDICATIONS  Current Outpatient Medications   Medication Sig Dispense Refill     acetaminophen (TYLENOL) 325 MG tablet Take 2 tablets (650 mg) by mouth every 4 hours as needed for mild pain or fever 100 tablet 0     docusate sodium (COLACE) 100 MG capsule Take 1 capsule (100 mg) by mouth daily 30 capsule 0     methimazole (TAPAZOLE) 10 MG tablet Take 2 tablets (20 mg) by mouth 3 times daily 180 tablet 2     Prenatal Vit-Fe Fumarate-FA (PRENATAL MULTIVITAMIN W/IRON) 27-0.8 MG tablet Take 1 tablet by mouth daily 90 tablet 3     propranolol ER (INDERAL LA) 160 MG 24 hr capsule Take 1 capsule (160 mg) by mouth daily 30 capsule 2       Allergies, family, and social history were reviewed and documented as needed in EHR.     REVIEW OF SYSTEMS  A focused ROS was performed, with pertinent positives and negatives as noted  in the HPI.    PHYSICAL EXAM  /68 (BP Location: Right arm, Patient Position: Sitting, Cuff Size: Adult Regular)   Pulse 72   Wt 47.2 kg (104 lb)   BMI 22.51 kg/m    Body mass index is 22.51 kg/m .  Constitutional: Vital signs reviewed, as recorded above. Patient is alert, oriented and appears in no acute distress.  Eyes: Mild prominence of the eyes, otherwise no stare or lid lag or retraction, no conjunctival injection.    Neck: Neck supple, thyroid is about 1.5 times the size of normal, without palpable nodules.  Some tenderness on exam.    Cardiovascular: Irregular rhythm, normal rate, no lower extremity edema.  Respiratory: CTAB, without wheezes, crackles or rhonchi; normal chest wall motion and respiratory effort.  MSK: No clubbing or cyanosis; normal muscle bulk and tone.  Skin: Normal skin color, temperature, turgor and texture.  Neurological: Alert and oriented times 3. No tremor.    DATA REVIEW  Each of the following laboratory and/or imaging studies were reviewed.    Component      Latest Ref Rng & Units 2/1/2023   Protein Total      6.4 - 8.3 g/dL 6.9   Albumin      3.5 - 5.2 g/dL 4.1   Bilirubin Total      <=1.2 mg/dL 0.6   Alkaline Phosphatase      35 - 104 U/L 182 (H)   AST      10 - 35 U/L 20   ALT      10 - 35 U/L 8 (L)   Bilirubin Direct      0.00 - 0.30 mg/dL <0.20   TSH      0.30 - 4.20 uIU/mL <0.01 (L)   Triiodothyronine (T3)      85 - 202 ng/dL 383 (H)   T4 Total      4.5 - 11.7 ug/dL 18.0 (H)   T4 Free      0.90 - 1.70 ng/dL 5.45 (H)   hCG Quantitative      <5 mIU/mL 136,921 (H)       ASSESSMENT  1.  Hyperthyroidism.  Due to Graves' disease.  Labs drawn earlier this month showed total T4 and total T3 levels were acceptable for pregnancy; would recheck thyroid function tests today as patient did not have a chance to complete previsit labs.  Based on those results, as well as the results of EKG, can determine whether to consider pharmacologic therapy (since we are uncertain of dates,  would consider using PTU at least for the short-term).  We discussed management of hyperthyroidism over the course of pregnancy as well as choice of medications.    2.  Graves' disease.  Seen in ophthalmology.  Lateral orbital decompression was contemplated but the patient had deferred surgery for the short-term.  No active eye symptoms at present.  Follow-up.    3.  History of tobacco use.  Has quit tobacco use.  Congratulated on this.    4.  Irregular rhythm.  Irregular heart rhythm, although not clearly irregularly irregular.  Would obtain EKG today, especially in light of coexisting hyperthyroidism and risk of arrhythmia.    5.  Pregnancy.  Address thyroid status as above.  Have asked patient to start prenatal vitamin.  Anticipating follow-up with her primary care clinic for obstetric care.    PLAN  -Labs today  -ECG today  -For now, can remain off medication for hyperthyroidism; based on results, we will decide whether to start treatment  -Continue propranolol 160 mg once daily  -Return for a follow-up visit in 1 month  -We will communicate results via Hot Dott, or if needed by phone    Orders Placed This Encounter   Procedures     EKG 12-lead complete w/read - Clinics     Addendum 2/18/2023:  -EKG shows PVCs and some ST-T changes.  She is not symptomatic.  Although her thyroid function tests are within the range that is acceptable for pregnancy, given these changes I have prescribed a very low-dose of PTU and have recommended follow-up thyroid function test in 2 weeks.  -I will update her PCP for any additional recommendations for work-up of PVCs: I suspect EKG findings are at least in part related to thyroid status but would defer to PCP on whether any additional work-up/referral may be needed.      I spent a total of 36 minutes on the date of encounter reviewing medical records, evaluating the patient, coordinating care and documenting in the EHR, as detailed above.      Matthew Echols MD   Division of  Diabetes, Endocrinology and Metabolism  Department of Medicine

## 2023-02-18 RX ORDER — PROPYLTHIOURACIL 50 MG/1
50 TABLET ORAL DAILY
Qty: 30 TABLET | Refills: 1 | Status: SHIPPED | OUTPATIENT
Start: 2023-02-18 | End: 2023-04-21 | Stop reason: ALTCHOICE

## 2023-03-16 LAB
TSH SERPL DL<=0.005 MIU/L-ACNC: <0.01 UIU/ML (ref 0.3–4.2)
TSH SERPL DL<=0.005 MIU/L-ACNC: <0.01 UIU/ML (ref 0.3–4.2)

## 2023-03-27 LAB
ABO/RH(D): NORMAL
ANTIBODY SCREEN: NEGATIVE
SPECIMEN EXPIRATION DATE: NORMAL

## 2023-03-28 ENCOUNTER — PRENATAL OFFICE VISIT (OUTPATIENT)
Dept: FAMILY MEDICINE | Facility: CLINIC | Age: 28
End: 2023-03-28
Payer: COMMERCIAL

## 2023-03-28 VITALS
TEMPERATURE: 97.6 F | WEIGHT: 102.5 LBS | BODY MASS INDEX: 22.18 KG/M2 | DIASTOLIC BLOOD PRESSURE: 70 MMHG | SYSTOLIC BLOOD PRESSURE: 126 MMHG | HEART RATE: 126 BPM

## 2023-03-28 DIAGNOSIS — O99.282 HYPERTHYROIDISM AFFECTING PREGNANCY IN SECOND TRIMESTER: ICD-10-CM

## 2023-03-28 DIAGNOSIS — Z32.00 POSSIBLE PREGNANCY: ICD-10-CM

## 2023-03-28 DIAGNOSIS — Z3A.15 15 WEEKS GESTATION OF PREGNANCY: Primary | ICD-10-CM

## 2023-03-28 DIAGNOSIS — E05.90 HYPERTHYROIDISM: ICD-10-CM

## 2023-03-28 DIAGNOSIS — E05.00 GRAVES DISEASE: ICD-10-CM

## 2023-03-28 DIAGNOSIS — E05.90 HYPERTHYROIDISM AFFECTING PREGNANCY IN SECOND TRIMESTER: ICD-10-CM

## 2023-03-28 PROBLEM — U07.1 LAB TEST POSITIVE FOR DETECTION OF COVID-19 VIRUS: Status: RESOLVED | Noted: 2021-11-19 | Resolved: 2023-03-28

## 2023-03-28 LAB
ALBUMIN UR-MCNC: 30 MG/DL
BASOPHILS # BLD AUTO: 0 10E3/UL (ref 0–0.2)
BASOPHILS NFR BLD AUTO: 0 %
EOSINOPHIL # BLD AUTO: 0 10E3/UL (ref 0–0.7)
EOSINOPHIL NFR BLD AUTO: 0 %
ERYTHROCYTE [DISTWIDTH] IN BLOOD BY AUTOMATED COUNT: 12.4 % (ref 10–15)
GLUCOSE UR STRIP-MCNC: NEGATIVE MG/DL
HBA1C MFR BLD: 4.7 % (ref 0–5.6)
HBV SURFACE AG SERPL QL IA: NONREACTIVE
HCG UR QL: POSITIVE
HCT VFR BLD AUTO: 36.9 % (ref 35–47)
HCV AB SERPL QL IA: NONREACTIVE
HGB BLD-MCNC: 12.3 G/DL (ref 11.7–15.7)
HIV 1+2 AB+HIV1 P24 AG SERPL QL IA: NONREACTIVE
IMM GRANULOCYTES # BLD: 0 10E3/UL
IMM GRANULOCYTES NFR BLD: 0 %
KETONES UR STRIP-MCNC: 15 MG/DL
LYMPHOCYTES # BLD AUTO: 1.6 10E3/UL (ref 0.8–5.3)
LYMPHOCYTES NFR BLD AUTO: 20 %
MCH RBC QN AUTO: 26.9 PG (ref 26.5–33)
MCHC RBC AUTO-ENTMCNC: 33.3 G/DL (ref 31.5–36.5)
MCV RBC AUTO: 81 FL (ref 78–100)
MONOCYTES # BLD AUTO: 0.5 10E3/UL (ref 0–1.3)
MONOCYTES NFR BLD AUTO: 6 %
NEUTROPHILS # BLD AUTO: 5.9 10E3/UL (ref 1.6–8.3)
NEUTROPHILS NFR BLD AUTO: 74 %
PLATELET # BLD AUTO: 201 10E3/UL (ref 150–450)
RBC # BLD AUTO: 4.57 10E6/UL (ref 3.8–5.2)
RUBV IGG SERPL QL IA: 0.55 INDEX
RUBV IGG SERPL QL IA: NORMAL
T PALLIDUM AB SER QL: NONREACTIVE
WBC # BLD AUTO: 7.9 10E3/UL (ref 4–11)

## 2023-03-28 PROCEDURE — 83036 HEMOGLOBIN GLYCOSYLATED A1C: CPT | Performed by: PHYSICIAN ASSISTANT

## 2023-03-28 PROCEDURE — 36415 COLL VENOUS BLD VENIPUNCTURE: CPT | Performed by: PHYSICIAN ASSISTANT

## 2023-03-28 PROCEDURE — 86803 HEPATITIS C AB TEST: CPT | Performed by: PHYSICIAN ASSISTANT

## 2023-03-28 PROCEDURE — 84480 ASSAY TRIIODOTHYRONINE (T3): CPT | Performed by: PHYSICIAN ASSISTANT

## 2023-03-28 PROCEDURE — 99000 SPECIMEN HANDLING OFFICE-LAB: CPT | Performed by: PHYSICIAN ASSISTANT

## 2023-03-28 PROCEDURE — 87491 CHLMYD TRACH DNA AMP PROBE: CPT | Performed by: PHYSICIAN ASSISTANT

## 2023-03-28 PROCEDURE — 81025 URINE PREGNANCY TEST: CPT | Performed by: PHYSICIAN ASSISTANT

## 2023-03-28 PROCEDURE — 81003 URINALYSIS AUTO W/O SCOPE: CPT | Performed by: PHYSICIAN ASSISTANT

## 2023-03-28 PROCEDURE — 99214 OFFICE O/P EST MOD 30 MIN: CPT | Performed by: PHYSICIAN ASSISTANT

## 2023-03-28 PROCEDURE — 87340 HEPATITIS B SURFACE AG IA: CPT | Performed by: PHYSICIAN ASSISTANT

## 2023-03-28 PROCEDURE — 87086 URINE CULTURE/COLONY COUNT: CPT | Performed by: PHYSICIAN ASSISTANT

## 2023-03-28 PROCEDURE — 84443 ASSAY THYROID STIM HORMONE: CPT | Performed by: PHYSICIAN ASSISTANT

## 2023-03-28 PROCEDURE — 85025 COMPLETE CBC W/AUTO DIFF WBC: CPT | Performed by: PHYSICIAN ASSISTANT

## 2023-03-28 PROCEDURE — 86900 BLOOD TYPING SEROLOGIC ABO: CPT | Performed by: PHYSICIAN ASSISTANT

## 2023-03-28 PROCEDURE — 84439 ASSAY OF FREE THYROXINE: CPT | Performed by: PHYSICIAN ASSISTANT

## 2023-03-28 PROCEDURE — 86901 BLOOD TYPING SEROLOGIC RH(D): CPT | Performed by: PHYSICIAN ASSISTANT

## 2023-03-28 PROCEDURE — 86762 RUBELLA ANTIBODY: CPT | Performed by: PHYSICIAN ASSISTANT

## 2023-03-28 PROCEDURE — 87389 HIV-1 AG W/HIV-1&-2 AB AG IA: CPT | Performed by: PHYSICIAN ASSISTANT

## 2023-03-28 PROCEDURE — 86780 TREPONEMA PALLIDUM: CPT | Performed by: PHYSICIAN ASSISTANT

## 2023-03-28 PROCEDURE — 83655 ASSAY OF LEAD: CPT | Mod: 90 | Performed by: PHYSICIAN ASSISTANT

## 2023-03-28 PROCEDURE — 86850 RBC ANTIBODY SCREEN: CPT | Performed by: PHYSICIAN ASSISTANT

## 2023-03-28 RX ORDER — PRENATAL VIT/IRON FUM/FOLIC AC 27MG-0.8MG
1 TABLET ORAL DAILY
Qty: 90 TABLET | Refills: 3 | Status: SHIPPED | OUTPATIENT
Start: 2023-03-28 | End: 2023-06-20

## 2023-03-28 NOTE — PATIENT INSTRUCTIONS
Pregnancy: about 15 weeks    Due Date: September 2023    Next visit in 4 weeks  With Dr. White  For Your First OB Visit        Someone should be calling you within 2-3 days to schedule your ultrasound appointment.  If you would like to schedule your ultrasound yourself, call #[phone].

## 2023-03-28 NOTE — LETTER
03/28/23          To Whom It May Concern:      Yusuf Stubbs (1995) is pregnant.  Estimated Date of Delivery: Aug 22, 2023        Sincerely,    Maine Ramsey PA-C

## 2023-03-29 LAB — C TRACH DNA SPEC QL NAA+PROBE: NEGATIVE

## 2023-03-30 LAB
BACTERIA UR CULT: NO GROWTH
LEAD BLDV-MCNC: <2 UG/DL

## 2023-03-31 ENCOUNTER — HOSPITAL ENCOUNTER (OUTPATIENT)
Dept: ULTRASOUND IMAGING | Facility: HOSPITAL | Age: 28
Discharge: HOME | End: 2023-03-31
Attending: PHYSICIAN ASSISTANT | Admitting: PHYSICIAN ASSISTANT
Payer: COMMERCIAL

## 2023-03-31 DIAGNOSIS — Z3A.15 15 WEEKS GESTATION OF PREGNANCY: ICD-10-CM

## 2023-03-31 PROCEDURE — 76805 OB US >/= 14 WKS SNGL FETUS: CPT

## 2023-04-02 ENCOUNTER — TELEPHONE (OUTPATIENT)
Dept: FAMILY MEDICINE | Facility: CLINIC | Age: 28
End: 2023-04-02
Payer: COMMERCIAL

## 2023-04-03 LAB
T3 SERPL-MCNC: 498 NG/DL (ref 85–202)
T4 FREE SERPL-MCNC: 5.31 NG/DL (ref 0.9–1.7)
TSH SERPL DL<=0.005 MIU/L-ACNC: <0.01 UIU/ML (ref 0.3–4.2)

## 2023-04-03 NOTE — PROGRESS NOTES
ASSESSMENT and PLAN:  1. Pregnancy Intake Appointment  Yusuf Stubbs is 27 year old and .  Patient's last menstrual period was 11/15/2022 (within weeks).  Estimated Date of Delivery: Sep 6, 2023 (from 17 week ultrasound)  She will be seeing Dr. White for OB care at [facility].  Screening pregnancy lab work was drawn.  Prenatal vitamin prescribed.  Problem list and current medications reviewed and updated.  Dating ultrasound ordered.  Potential exposures/risks discussed: work environment, raw meat/seafood/deli meat, home construction, cats, caffeine.  Follow up in 4 weeks.    2. Hyperthyroidism affecting pregnancy in second trimester  4. Graves disease  5. Hyperthyroidism  Has been following off and on with Endocrinology.  Last visit with them 23.  Taking PTU and and propranolol, as directed by Endocrine.  Missed her follow-up visit with Endo.  I will have our schedulers contact her to help schedule a follow-up visit with them.          HPI:  Yusuf Stubbs is a 27 year old female here for pregnancy intake.  She is .  Patient's last menstrual period was 11/15/2022 (within weeks).  Feeling well, no concerns.    Positive home pregnancy test in January.  Unsure LMP.  People smoke at her home, but outside the home.  She reports she got a flu shot in October.  She did quit smoking.  Last delivery 2022,  at 35 weeks.    Past Medical History:   Diagnosis Date     Endocrine disease      Hypertension      Hyperthyroidism 2016     Lab test positive for detection of COVID-19 virus 2021     Normal delivery 2022     Precipitous delivery, delivered (current hospitalization) 2016      delivery 2016     Unspecified fetal growth retardation, unspecified (weight) 2013     Urinary tract infection         Past Surgical History:   Procedure Laterality Date     NO PAST SURGERIES          Current Outpatient Medications   Medication     Prenatal Vit-Fe Fumarate-FA (PRENATAL  MULTIVITAMIN W/IRON) 27-0.8 MG tablet     methimazole (TAPAZOLE) 10 MG tablet     propranolol ER (INDERAL LA) 160 MG 24 hr capsule     propylthiouracil (PTU) 50 MG tablet     No current facility-administered medications for this visit.          OBJECTIVE:  No Known Allergies  /70 (BP Location: Left arm, Patient Position: Sitting, Cuff Size: Adult Regular)   Pulse (!) 126   Temp 97.6  F (36.4  C) (Temporal)   Wt 46.5 kg (102 lb 8 oz)   LMP 11/15/2022 (Within Weeks)   BMI 22.18 kg/m     Body mass index is 22.18 kg/m .     Vital signs stable as recorded above.  General: Patient is alert and oriented x 3, in no apparent distress  Fetal Exam: FHR heard at 170 bpm, fundal height not palpable, patient has not noticed any fetal movement    Prenatal Office Visit on 03/28/2023   Component Date Value Ref Range Status     hCG Urine Qualitative 03/28/2023 Positive (A)  Negative Final     Culture 03/28/2023 No Growth   Final     Glucose Urine 03/28/2023 Negative  Negative mg/dL Final     Ketones Urine 03/28/2023 15 (A)  Negative mg/dL Final     Protein Albumin Urine 03/28/2023 30 (A)  Negative mg/dL Final     Hepatitis B Surface Antigen 03/28/2023 Nonreactive  Nonreactive Final     HIV Antigen Antibody Combo 03/28/2023 Nonreactive  Nonreactive Final     Rubella Rosa IgG Instrument Value 03/28/2023 0.55  <0.90 Index Final     Rubella Antibody IgG 03/28/2023 No detectable antibody.   Final     Treponema Antibody Total 03/28/2023 Nonreactive  Nonreactive Final     Hemoglobin A1C 03/28/2023 4.7  0.0 - 5.6 % Final     Hepatitis C Antibody 03/28/2023 Nonreactive  Nonreactive Final     Chlamydia trachomatis 03/28/2023 Negative  Negative Final     Lead Venous Blood 03/28/2023 <2.0  <=4.9 ug/dL Final     ABO/RH(D) 03/28/2023 B POS   Final     Antibody Screen 03/28/2023 Negative  Negative Final     SPECIMEN EXPIRATION DATE 03/28/2023 [id number]   Final     WBC Count 03/28/2023 7.9  4.0 - 11.0 10e3/uL Final     RBC Count  03/28/2023 4.57  3.80 - 5.20 10e6/uL Final     Hemoglobin 03/28/2023 12.3  11.7 - 15.7 g/dL Final     Hematocrit 03/28/2023 36.9  35.0 - 47.0 % Final     MCV 03/28/2023 81  78 - 100 fL Final     MCH 03/28/2023 26.9  26.5 - 33.0 pg Final     MCHC 03/28/2023 33.3  31.5 - 36.5 g/dL Final     RDW 03/28/2023 12.4  10.0 - 15.0 % Final     Platelet Count 03/28/2023 201  150 - 450 10e3/uL Final     % Neutrophils 03/28/2023 74  % Final     % Lymphocytes 03/28/2023 20  % Final     % Monocytes 03/28/2023 6  % Final     % Eosinophils 03/28/2023 0  % Final     % Basophils 03/28/2023 0  % Final     % Immature Granulocytes 03/28/2023 0  % Final     Absolute Neutrophils 03/28/2023 5.9  1.6 - 8.3 10e3/uL Final     Absolute Lymphocytes 03/28/2023 1.6  0.8 - 5.3 10e3/uL Final     Absolute Monocytes 03/28/2023 0.5  0.0 - 1.3 10e3/uL Final     Absolute Eosinophils 03/28/2023 0.0  0.0 - 0.7 10e3/uL Final     Absolute Basophils 03/28/2023 0.0  0.0 - 0.2 10e3/uL Final     Absolute Immature Granulocytes 03/28/2023 0.0  <=0.4 10e3/uL Final          Other screening pregnancy lab work is pending.      Please see OB Episode for complete details.    This dictation uses voice recognition software, which may contain typographical errors.

## 2023-04-03 NOTE — TELEPHONE ENCOUNTER
Latoya Shultz contacted [facility] on 04/03/23 and left a message. If patient calls back please schedule appointment as soon as possible. See below message and assist patient in scheduling an appointment with Endocrinology. Thank you.

## 2023-04-03 NOTE — TELEPHONE ENCOUNTER
Can you please contact patient and help her schedule follow up visit with Endocrine?  They want to see her ASAP due to her pregnancy.    Also, can you confirm if she is taking any medicines for her thyroid, and if she is - what medicine?  Thank.s

## 2023-04-05 DIAGNOSIS — E05.90 HYPERTHYROIDISM: Primary | ICD-10-CM

## 2023-04-07 NOTE — TELEPHONE ENCOUNTER
Patient has a future F2F appointment on 4/21/23  with [facility] ENDOCRINOLOGY . Completing task.

## 2023-04-12 ENCOUNTER — LAB (OUTPATIENT)
Dept: LAB | Facility: CLINIC | Age: 28
End: 2023-04-12
Payer: COMMERCIAL

## 2023-04-12 DIAGNOSIS — E05.90 HYPERTHYROIDISM: ICD-10-CM

## 2023-04-12 LAB
ALBUMIN SERPL BCG-MCNC: 3.4 G/DL (ref 3.5–5.2)
ALP SERPL-CCNC: 135 U/L (ref 35–104)
ALT SERPL W P-5'-P-CCNC: <5 U/L (ref 10–35)
AST SERPL W P-5'-P-CCNC: 19 U/L (ref 10–35)
BILIRUB DIRECT SERPL-MCNC: <0.2 MG/DL (ref 0–0.3)
BILIRUB SERPL-MCNC: 0.4 MG/DL
PROT SERPL-MCNC: 6.2 G/DL (ref 6.4–8.3)
T3 SERPL-MCNC: 346 NG/DL (ref 85–202)
T4 SERPL-MCNC: 18.1 UG/DL (ref 4.5–11.7)
TSH SERPL DL<=0.005 MIU/L-ACNC: <0.01 UIU/ML (ref 0.3–4.2)

## 2023-04-12 PROCEDURE — 84480 ASSAY TRIIODOTHYRONINE (T3): CPT

## 2023-04-12 PROCEDURE — 84436 ASSAY OF TOTAL THYROXINE: CPT

## 2023-04-12 PROCEDURE — 84443 ASSAY THYROID STIM HORMONE: CPT

## 2023-04-12 PROCEDURE — 80076 HEPATIC FUNCTION PANEL: CPT

## 2023-04-12 PROCEDURE — 36415 COLL VENOUS BLD VENIPUNCTURE: CPT

## 2023-04-21 ENCOUNTER — OFFICE VISIT (OUTPATIENT)
Dept: ENDOCRINOLOGY | Facility: CLINIC | Age: 28
End: 2023-04-21
Payer: COMMERCIAL

## 2023-04-21 VITALS
BODY MASS INDEX: 22.63 KG/M2 | SYSTOLIC BLOOD PRESSURE: 112 MMHG | DIASTOLIC BLOOD PRESSURE: 60 MMHG | HEIGHT: 57 IN | HEART RATE: 68 BPM | WEIGHT: 104.9 LBS

## 2023-04-21 DIAGNOSIS — E05.90 HYPERTHYROIDISM: Primary | ICD-10-CM

## 2023-04-21 PROCEDURE — 99214 OFFICE O/P EST MOD 30 MIN: CPT | Performed by: INTERNAL MEDICINE

## 2023-04-21 RX ORDER — METHIMAZOLE 5 MG/1
2.5 TABLET ORAL DAILY
Qty: 15 TABLET | Refills: 4 | Status: SHIPPED | OUTPATIENT
Start: 2023-04-21 | End: 2023-06-13

## 2023-04-21 NOTE — PATIENT INSTRUCTIONS
-Stop PTU  -Start methimazole 2.5 mg daily (HALF of 5 mg tablet)--let me know if feeling unwell with change to methimazole  -Continue propranolol 160 mg once daily  -Labs in 3 weeks  -Return for a follow-up visit 3 months  -We will communicate results via Docstoc, or if needed by phone

## 2023-04-21 NOTE — PROGRESS NOTES
ENDOCRINOLOGY FOLLOW-UP         HISTORY OF PRESENT ILLNESS    Yusuf Stubbs is seen in follow-up: Lost to follow-up after last visit on 2/17/2023.    Currently at 20 weeks +2 days of gestation and EDC of 9/6/2023 based on initial OB ultrasound.    Patient delivered her baby boy on 5/24/2022 and had been discharged from hospital on methimazole and propranolol.  Her baby boy developed jaundice and hyperthyroidism and required treatment with methimazole.    Patient updated me with an unexpected pregnancy in 1/2023.  We discontinued methimazole and rechecked thyroid function tests.  Since hyperthyroidism was within acceptable range for pregnancy, we initially deferred starting PTU.  However, at follow-up appointment in 2/2023, the patient was tachycardic and although not experiencing thyrotoxic symptoms, had a regular rate on cardiac exam with EKG revealing PVCs.  I therefore initiated PTU 50 mg daily at that time.    Current medication regimen: PTU 50 mg daily, propranolol extended release 160 mg daily.    Notes she is taking her medications consistently.  Feels better on PTU than she did on methimazole.    Energy is fairly good.  Has been gardening in the warmer weather.  No palpitations or tremors.    Not using tobacco.    No change in the feel of her neck.  No dysphagia or hoarseness.    Had eye exam at [facility] last week: Needs glasses.  Has not noted any eye discomfort or pain: Does have some redness of her eyes which she attributes to allergies.    OB ultrasound on 3/31/2023 revealed single living intrauterine gestation.  EDC by ultrasound was on 9/6/2023.    Pertinent endocrine and related history:  1.  Hypothyroidism, due to Graves' disease.  Was initially diagnosed with hyperthyroidism secondary to Graves Disease in 2016. She had been pregnant at the time. She was started on methimazole 15 mg daily. At the time of initial presentation she had been experiencing weight loss of 20 pounds, palpitations, heat  intolerance and anxiety.   -She followed with endocrinology and was treated through another pregnancy in 2017.  She recalls that both of the children she had with hyperthyroidism during pregnancy ultimately needed treatment with methimazole in infancy.    -Radioactive iodine ablation was contemplated (initially deferred due to concern for worsening of thyroid eye disease) and ultimately pursued due to patient preference: Radioactive iodine uptake scan revealed an 80% uptake. She underwent radioactive iodine ablation on 2019 and received 14.6 mCi.   -She was last seen by Endocrinology at [facility] in 2019 by Dr. Sharma at which time the patient was prescribed methimazole (the medication was resumed due to persistent hyperthyroidism after radioiodine therapy).  -During most recent pregnancy, she was initially started on PTU 50 mg daily in 2021 (prescribed 2021) then transitioned to methimazole 5 mg TID on 22 at 16 weeks gestation.   2.  History of thyroid related eye disease.    PAST MEDICAL HISTORY  Past Medical History:   Diagnosis Date     Endocrine disease      Hypertension      Hyperthyroidism 2016     Lab test positive for detection of COVID-19 virus 2021     Normal delivery 2022     Precipitous delivery, delivered (current hospitalization) 2016      delivery 2016     Unspecified fetal growth retardation, unspecified (weight)      Urinary tract infection        MEDICATIONS  Current Outpatient Medications   Medication Sig Dispense Refill     methimazole (TAPAZOLE) 10 MG tablet Take 2 tablets (20 mg) by mouth 3 times daily (Patient not taking: Reported on 2023) 180 tablet 2     Prenatal Vit-Fe Fumarate-FA (PRENATAL MULTIVITAMIN W/IRON) 27-0.8 MG tablet Take 1 tablet by mouth daily 90 tablet 3     propranolol ER (INDERAL LA) 160 MG 24 hr capsule Take 1 capsule (160 mg) by mouth daily 30 capsule 2     propylthiouracil (PTU) 50 MG tablet Take 1  "tablet (50 mg) by mouth daily 30 tablet 1       Allergies, family, and social history were reviewed and documented as needed in EHR.     REVIEW OF SYSTEMS  A focused ROS was performed, with pertinent positives and negatives as noted in the HPI.    PHYSICAL EXAM  /60 (BP Location: Right arm, Patient Position: Sitting, Cuff Size: Adult Regular)   Pulse 68   Ht 1.448 m (4' 9\")   Wt 47.6 kg (104 lb 14.4 oz)   LMP 11/15/2022 (Within Weeks)   BMI 22.70 kg/m    Body mass index is 22.7 kg/m .  Constitutional: Vital signs reviewed, as recorded above. Patient is alert, oriented and appears in no acute distress.  Eyes: Mild prominence of the eyes, left greater than right; no stare or lid lag or retraction, no conjunctival injection.    Neck: Neck supple, thyroid feels slightly more diminutive on this exam, about 1.5 times the size of normal, without palpable nodules.  Some tenderness on exam.    Cardiovascular: Irregular rhythm, normal rate, no lower extremity edema.  Respiratory: CTAB, without wheezes, crackles or rhonchi; normal chest wall motion and respiratory effort.  MSK: No clubbing or cyanosis; normal muscle bulk and tone.  Skin: Normal skin color, temperature, turgor and texture.  Neurological: Alert and oriented times 3. No tremor.    DATA REVIEW  Each of the following laboratory and/or imaging studies were reviewed.      Component      Latest Ref Rng 4/12/2023  10:42 AM   Protein Total      6.4 - 8.3 g/dL 6.2 (L)    Albumin      3.5 - 5.2 g/dL 3.4 (L)    Bilirubin Total      <=1.2 mg/dL 0.4    Alkaline Phosphatase      35 - 104 U/L 135 (H)    AST      10 - 35 U/L 19    ALT      10 - 35 U/L <5 (L)    Bilirubin Direct      0.00 - 0.30 mg/dL <0.20    T4 Total      4.5 - 11.7 ug/dL 18.1 (H)    Triiodothyronine (T3)      85 - 202 ng/dL 346 (H)    TSH      0.30 - 4.20 uIU/mL <0.01 (L)       Legend:  (L) Low  (H) High    ASSESSMENT  1.  Hyperthyroidism.  Due to Graves' disease.  Initiated PTU in 2/2023, with " improvement in thyroid function tests.  Labs drawn prior to this visit indicate T4 and T3 levels are in ideal range for pregnancy.  However, since she is in her second trimester now I would recommend changing from PTU to methimazole to minimize adverse side effects.  Ms. Stubbs is open to this change although she has some concerns about feeling unwell on methimazole (has felt her best on PTU).  Have asked her to keep me updated if she feels unwell with this transition so that we can reconsider using PTU if necessary.  We discussed the importance of periodic monitoring of thyroid function tests during pregnancy: With change in antithyroid drug regimen, we will check thyroid function tests in 3 weeks.  The patient prefers to have less frequent visits due to transportation limitations (she shares a car with her ): We will therefore arrange for follow-up in clinic in 3 months and plan to monitor labs and communicate via [facility] or phone.    2.  Graves' disease.  Seen in ophthalmology.  Lateral orbital decompression was contemplated but the patient had deferred surgery for the short-term.  No active eye symptoms at present.  Check TSI and TSH receptor antibody with next lab draw in 3 weeks: May need closer fetal monitoring if titers are markedly elevated.    3.  History of tobacco use.  Has quit tobacco use.      4.  Irregular rhythm.  Noted on exam last visit, EKG with PVCs.  Address hyperthyroidism as above.  Otherwise, follow-up in primary care.    5.  Pregnancy.  Address thyroid status as above.  Follow-up with her primary care clinic for obstetric care.    PLAN  -Stop PTU  -Start methimazole 2.5 mg daily (HALF of 5 mg tablet)--let me know if feeling unwell with change to methimazole  -Continue propranolol 160 mg once daily  -Labs in 3 weeks  -Return for a follow-up visit 3 months  -We will communicate results via [facility], or if needed by phone    Orders Placed This Encounter   Procedures     Hepatic function  panel     Thyroxine total     T3 total       I spent a total of 37 minutes on the date of encounter reviewing medical records, evaluating the patient, coordinating care and documenting in the EHR, as detailed above.      Matthew Echols MD   Division of Diabetes, Endocrinology and Metabolism  Department of Medicine

## 2023-04-21 NOTE — LETTER
4/21/2023         RE: Yusuf Stubbs  [address] [place] [address]        Dear Colleague,    Thank you for referring your patient, Yusuf Stubbs, to the [facility]. Please see a copy of my visit note below.      ENDOCRINOLOGY FOLLOW-UP         HISTORY OF PRESENT ILLNESS    Yusuf Stubbs is seen in follow-up: Lost to follow-up after last visit on 2/17/2023.    Currently at 20 weeks +2 days of gestation and EDC of 9/6/2023 based on initial OB ultrasound.    Patient delivered her baby boy on 5/24/2022 and had been discharged from hospital on methimazole and propranolol.  Her baby boy developed jaundice and hyperthyroidism and required treatment with methimazole.    Patient updated me with an unexpected pregnancy in 1/2023.  We discontinued methimazole and rechecked thyroid function tests.  Since hyperthyroidism was within acceptable range for pregnancy, we initially deferred starting PTU.  However, at follow-up appointment in 2/2023, the patient was tachycardic and although not experiencing thyrotoxic symptoms, had a regular rate on cardiac exam with EKG revealing PVCs.  I therefore initiated PTU 50 mg daily at that time.    Current medication regimen: PTU 50 mg daily, propranolol extended release 160 mg daily.    Notes she is taking her medications consistently.  Feels better on PTU than she did on methimazole.    Energy is fairly good.  Has been gardening in the warmer weather.  No palpitations or tremors.    Not using tobacco.    No change in the feel of her neck.  No dysphagia or hoarseness.    Had eye exam at [facility] last week: Needs glasses.  Has not noted any eye discomfort or pain: Does have some redness of her eyes which she attributes to allergies.    OB ultrasound on 3/31/2023 revealed single living intrauterine gestation.  EDC by ultrasound was on 9/6/2023.    Pertinent endocrine and related history:  1.  Hypothyroidism, due to Graves' disease.  Was initially diagnosed  with hyperthyroidism secondary to Graves Disease in 2016. She had been pregnant at the time. She was started on methimazole 15 mg daily. At the time of initial presentation she had been experiencing weight loss of 20 pounds, palpitations, heat intolerance and anxiety.   -She followed with endocrinology and was treated through another pregnancy in 2017.  She recalls that both of the children she had with hyperthyroidism during pregnancy ultimately needed treatment with methimazole in infancy.    -Radioactive iodine ablation was contemplated (initially deferred due to concern for worsening of thyroid eye disease) and ultimately pursued due to patient preference: Radioactive iodine uptake scan revealed an 80% uptake. She underwent radioactive iodine ablation on 2019 and received 14.6 mCi.   -She was last seen by Endocrinology at [facility] in  by Dr. Sharma at which time the patient was prescribed methimazole (the medication was resumed due to persistent hyperthyroidism after radioiodine therapy).  -During most recent pregnancy, she was initially started on PTU 50 mg daily in 2021 (prescribed 2021) then transitioned to methimazole 5 mg TID on 22 at 16 weeks gestation.   2.  History of thyroid related eye disease.    PAST MEDICAL HISTORY  Past Medical History:   Diagnosis Date     Endocrine disease      Hypertension      Hyperthyroidism 2016     Lab test positive for detection of COVID-19 virus 2021     Normal delivery 2022     Precipitous delivery, delivered (current hospitalization) 2016      delivery 2016     Unspecified fetal growth retardation, unspecified (weight)      Urinary tract infection        MEDICATIONS  Current Outpatient Medications   Medication Sig Dispense Refill     methimazole (TAPAZOLE) 10 MG tablet Take 2 tablets (20 mg) by mouth 3 times daily (Patient not taking: Reported on 2023) 180 tablet 2     Prenatal Vit-Fe Fumarate-FA  "(PRENATAL MULTIVITAMIN W/IRON) 27-0.8 MG tablet Take 1 tablet by mouth daily 90 tablet 3     propranolol ER (INDERAL LA) 160 MG 24 hr capsule Take 1 capsule (160 mg) by mouth daily 30 capsule 2     propylthiouracil (PTU) 50 MG tablet Take 1 tablet (50 mg) by mouth daily 30 tablet 1       Allergies, family, and social history were reviewed and documented as needed in EHR.     REVIEW OF SYSTEMS  A focused ROS was performed, with pertinent positives and negatives as noted in the HPI.    PHYSICAL EXAM  /60 (BP Location: Right arm, Patient Position: Sitting, Cuff Size: Adult Regular)   Pulse 68   Ht 1.448 m (4' 9\")   Wt 47.6 kg (104 lb 14.4 oz)   LMP 11/15/2022 (Within Weeks)   BMI 22.70 kg/m    Body mass index is 22.7 kg/m .  Constitutional: Vital signs reviewed, as recorded above. Patient is alert, oriented and appears in no acute distress.  Eyes: Mild prominence of the eyes, left greater than right; no stare or lid lag or retraction, no conjunctival injection.    Neck: Neck supple, thyroid feels slightly more diminutive on this exam, about 1.5 times the size of normal, without palpable nodules.  Some tenderness on exam.    Cardiovascular: Irregular rhythm, normal rate, no lower extremity edema.  Respiratory: CTAB, without wheezes, crackles or rhonchi; normal chest wall motion and respiratory effort.  MSK: No clubbing or cyanosis; normal muscle bulk and tone.  Skin: Normal skin color, temperature, turgor and texture.  Neurological: Alert and oriented times 3. No tremor.    DATA REVIEW  Each of the following laboratory and/or imaging studies were reviewed.      Component      Latest Ref Rn 4/12/2023  10:42 AM   Protein Total      6.4 - 8.3 g/dL 6.2 (L)    Albumin      3.5 - 5.2 g/dL 3.4 (L)    Bilirubin Total      <=1.2 mg/dL 0.4    Alkaline Phosphatase      35 - 104 U/L 135 (H)    AST      10 - 35 U/L 19    ALT      10 - 35 U/L <5 (L)    Bilirubin Direct      0.00 - 0.30 mg/dL <0.20    T4 Total      4.5 - " 11.7 ug/dL 18.1 (H)    Triiodothyronine (T3)      85 - 202 ng/dL 346 (H)    TSH      0.30 - 4.20 uIU/mL <0.01 (L)       Legend:  (L) Low  (H) High    ASSESSMENT  1.  Hyperthyroidism.  Due to Graves' disease.  Initiated PTU in 2/2023, with improvement in thyroid function tests.  Labs drawn prior to this visit indicate T4 and T3 levels are in ideal range for pregnancy.  However, since she is in her second trimester now I would recommend changing from PTU to methimazole to minimize adverse side effects.  Ms. Stubbs is open to this change although she has some concerns about feeling unwell on methimazole (has felt her best on PTU).  Have asked her to keep me updated if she feels unwell with this transition so that we can reconsider using PTU if necessary.  We discussed the importance of periodic monitoring of thyroid function tests during pregnancy: With change in antithyroid drug regimen, we will check thyroid function tests in 3 weeks.  The patient prefers to have less frequent visits due to transportation limitations (she shares a car with her ): We will therefore arrange for follow-up in clinic in 3 months and plan to monitor labs and communicate via MyChart or phone.    2.  Graves' disease.  Seen in ophthalmology.  Lateral orbital decompression was contemplated but the patient had deferred surgery for the short-term.  No active eye symptoms at present.  Check TSI and TSH receptor antibody with next lab draw in 3 weeks: May need closer fetal monitoring if titers are markedly elevated.    3.  History of tobacco use.  Has quit tobacco use.      4.  Irregular rhythm.  Noted on exam last visit, EKG with PVCs.  Address hyperthyroidism as above.  Otherwise, follow-up in primary care.    5.  Pregnancy.  Address thyroid status as above.  Follow-up with her primary care clinic for obstetric care.    PLAN  -Stop PTU  -Start methimazole 2.5 mg daily (HALF of 5 mg tablet)--let me know if feeling unwell with change to  methimazole  -Continue propranolol 160 mg once daily  -Labs in 3 weeks  -Return for a follow-up visit 3 months  -We will communicate results via MyChart, or if needed by phone    Orders Placed This Encounter   Procedures     Hepatic function panel     Thyroxine total     T3 total       I spent a total of 37 minutes on the date of encounter reviewing medical records, evaluating the patient, coordinating care and documenting in the EHR, as detailed above.      Cortez Echols MD   Division of Diabetes, Endocrinology and Metabolism  Department of Medicine              Again, thank you for allowing me to participate in the care of your patient.        Sincerely,        CORTEZ Echols MD

## 2023-04-27 ENCOUNTER — TRANSCRIBE ORDERS (OUTPATIENT)
Dept: MATERNAL FETAL MEDICINE | Facility: HOSPITAL | Age: 28
End: 2023-04-27

## 2023-04-27 ENCOUNTER — PRE VISIT (OUTPATIENT)
Dept: MATERNAL FETAL MEDICINE | Facility: HOSPITAL | Age: 28
End: 2023-04-27

## 2023-04-27 ENCOUNTER — PRENATAL OFFICE VISIT (OUTPATIENT)
Dept: FAMILY MEDICINE | Facility: CLINIC | Age: 28
End: 2023-04-27
Payer: COMMERCIAL

## 2023-04-27 VITALS
WEIGHT: 105 LBS | HEART RATE: 70 BPM | DIASTOLIC BLOOD PRESSURE: 56 MMHG | RESPIRATION RATE: 20 BRPM | HEIGHT: 57 IN | TEMPERATURE: 97.3 F | BODY MASS INDEX: 22.65 KG/M2 | OXYGEN SATURATION: 98 % | SYSTOLIC BLOOD PRESSURE: 136 MMHG

## 2023-04-27 DIAGNOSIS — O44.40 LOW-LYING PLACENTA: ICD-10-CM

## 2023-04-27 DIAGNOSIS — O99.280 HYPERTHYROIDISM AFFECTING PREGNANCY, ANTEPARTUM: ICD-10-CM

## 2023-04-27 DIAGNOSIS — Z87.51 HISTORY OF PRETERM DELIVERY: ICD-10-CM

## 2023-04-27 DIAGNOSIS — O26.90 PREGNANCY RELATED CONDITION, ANTEPARTUM: Primary | ICD-10-CM

## 2023-04-27 DIAGNOSIS — E05.90 HYPERTHYROIDISM AFFECTING PREGNANCY, ANTEPARTUM: ICD-10-CM

## 2023-04-27 DIAGNOSIS — O09.90 SUPERVISION OF HIGH RISK PREGNANCY, ANTEPARTUM: Primary | ICD-10-CM

## 2023-04-27 DIAGNOSIS — O26.879 SHORT CERVIX AFFECTING PREGNANCY: ICD-10-CM

## 2023-04-27 PROBLEM — E05.00 GRAVES DISEASE: Status: ACTIVE | Noted: 2017-02-15

## 2023-04-27 PROBLEM — Z3A.15 15 WEEKS GESTATION OF PREGNANCY: Status: RESOLVED | Noted: 2023-03-28 | Resolved: 2023-04-27

## 2023-04-27 PROCEDURE — 99207 PR COMPLICATED OB VISIT: CPT | Performed by: FAMILY MEDICINE

## 2023-04-27 PROCEDURE — 81003 URINALYSIS AUTO W/O SCOPE: CPT | Performed by: FAMILY MEDICINE

## 2023-04-27 PROCEDURE — 99213 OFFICE O/P EST LOW 20 MIN: CPT | Mod: 24 | Performed by: FAMILY MEDICINE

## 2023-04-27 RX ORDER — PROGESTERONE 200 MG/1
200 CAPSULE ORAL AT BEDTIME
Qty: 90 CAPSULE | Refills: 1 | Status: ON HOLD | OUTPATIENT
Start: 2023-04-27 | End: 2023-08-18

## 2023-04-27 ASSESSMENT — PAIN SCALES - GENERAL: PAINLEVEL: NO PAIN (0)

## 2023-04-27 NOTE — PROGRESS NOTES
"Discussed screening for aneuploidy and neural tube defects.  She declined any genetic screening.    Carrier screening for SMA, CF, thalassemia, fragile X discussed.  As above she declined any genetic screening.    Reviewed intake exam, labs, ANNEMARIE, past medical history, past surgical history, family history, genetic history, and previous obstetrical history.    Preeclampsia risk factors - at increased risk if 1+ high risk or 2+ moderate risk factors    High risk factors (1+):chronic hypertension    Moderate risk factors (2+): NONE     Diabetes risk factors - at increased risk if BMI 25+ and 1+ additional risk factors      BMI: 22.44     Additional risk factors (1+): Ethnicity: , , ,  American,      The American College of Obstetrics and Gynecology recommends those with a prepregnancy BMI of 18.5-24.9 should aim for pregnancy weight gain of 25-35 pounds.    She has not gained much.  Likely due to hyperthyroidism.  Discussed increasing calories.    Lab Results   Component Value Date    A1C 4.7 03/28/2023    A1C 4.8 11/16/2021       /56 (BP Location: Left arm, Patient Position: Sitting, Cuff Size: Adult Regular)   Pulse 70   Temp 97.3  F (36.3  C) (Temporal)   Resp 20   Ht 1.45 m (4' 9.09\")   Wt 47.6 kg (105 lb)   LMP 11/15/2022 (Within Weeks)   SpO2 98%   BMI 22.65 kg/m     GENERAL: alert, not distressed  CHEST: clear, no rales, rhonchi, or wheezes  CARDIAC: regular without murmur, gallop, or rub  ABDOMEN: soft, non tender, non distended, normal bowel sounds    Assessment and Plan:  1. Supervision of high risk pregnancy, antepartum    2. Hyperthyroidism affecting pregnancy, antepartum  Working with endocrinology.  Currently on methimazole after transitioning to that from PTU.  Propranolol as well.  Blood pressure on the higher side.  May be related to hyperthyroidism.  Had aspirin previously during pregnancy.  I think potential benefits of aspirin " outweigh risks in this scenario.  Recommended starting.  Symptoms reasonably well controlled as far as heart rate, anxiety, hot heat intolerance.  - [facility] Referral - Pregnancy; Future  - aspirin (ASA) 81 MG EC tablet; Take 1 tablet (81 mg) by mouth daily  Dispense: 90 tablet; Refill: 2    3. Short cervix affecting pregnancy  4. History of  delivery  2.5 cm on ultrasound previously.  Will need follow-up.  Discussed vaginal progesterone since she has had 3 previous deliveries  of 4 pregnancies.  We will start vaginal progesterone now.  Maternal-fetal medicine referral for ultrasound follow-up and potential for cerclage.  - [facility] Referral - Pregnancy; Future  - aspirin (ASA) 81 MG EC tablet; Take 1 tablet (81 mg) by mouth daily  Dispense: 90 tablet; Refill: 2  - progesterone (PROMETRIUM) 200 MG capsule; Place 1 capsule (200 mg) vaginally At Bedtime  Dispense: 90 capsule; Refill: 1    5. Low-lying placenta  Has not have any bleeding.  Reassess on ultrasound.  Precautions discussed.

## 2023-04-28 ENCOUNTER — ANCILLARY PROCEDURE (OUTPATIENT)
Dept: ULTRASOUND IMAGING | Facility: HOSPITAL | Age: 28
End: 2023-04-28
Attending: FAMILY MEDICINE
Payer: COMMERCIAL

## 2023-04-28 ENCOUNTER — OFFICE VISIT (OUTPATIENT)
Dept: MATERNAL FETAL MEDICINE | Facility: HOSPITAL | Age: 28
End: 2023-04-28
Attending: FAMILY MEDICINE
Payer: COMMERCIAL

## 2023-04-28 DIAGNOSIS — E05.90 HYPERTHYROIDISM AFFECTING PREGNANCY IN THIRD TRIMESTER: Primary | ICD-10-CM

## 2023-04-28 DIAGNOSIS — O99.283 HYPERTHYROIDISM AFFECTING PREGNANCY IN THIRD TRIMESTER: Primary | ICD-10-CM

## 2023-04-28 DIAGNOSIS — O26.90 PREGNANCY RELATED CONDITION, ANTEPARTUM: ICD-10-CM

## 2023-04-28 DIAGNOSIS — O10.913 PRE-EXISTING HYPERTENSION DURING PREGNANCY IN THIRD TRIMESTER, UNSPECIFIED PRE-EXISTING HYPERTENSION TYPE: ICD-10-CM

## 2023-04-28 PROCEDURE — 76811 OB US DETAILED SNGL FETUS: CPT | Mod: 26 | Performed by: OBSTETRICS & GYNECOLOGY

## 2023-04-28 PROCEDURE — 99207 PR NO CHARGE LOS: CPT | Performed by: OBSTETRICS & GYNECOLOGY

## 2023-04-28 PROCEDURE — 76817 TRANSVAGINAL US OBSTETRIC: CPT | Mod: 26 | Performed by: OBSTETRICS & GYNECOLOGY

## 2023-04-28 PROCEDURE — 76811 OB US DETAILED SNGL FETUS: CPT

## 2023-04-28 NOTE — PROGRESS NOTES
Please refer to ultrasound report under 'Imaging' Studies of 'Chart Review' tabs.    Jake Zamora M.D.

## 2023-05-26 ENCOUNTER — MYC MEDICAL ADVICE (OUTPATIENT)
Dept: ENDOCRINOLOGY | Facility: CLINIC | Age: 28
End: 2023-05-26
Payer: COMMERCIAL

## 2023-05-26 NOTE — TELEPHONE ENCOUNTER
Due for thyroid function tests: We planned to draw these mid-May.  Have sent MyChart reminder to patient.    Matthew Echols MD 5/26/2023 5:42 PM

## 2023-06-12 ENCOUNTER — LAB (OUTPATIENT)
Dept: LAB | Facility: CLINIC | Age: 28
End: 2023-06-12
Payer: COMMERCIAL

## 2023-06-12 DIAGNOSIS — E05.90 HYPERTHYROIDISM: ICD-10-CM

## 2023-06-12 LAB
ALBUMIN SERPL BCG-MCNC: 3.4 G/DL (ref 3.5–5.2)
ALP SERPL-CCNC: 161 U/L (ref 35–104)
ALT SERPL W P-5'-P-CCNC: 6 U/L (ref 10–35)
AST SERPL W P-5'-P-CCNC: 16 U/L (ref 10–35)
BILIRUB DIRECT SERPL-MCNC: <0.2 MG/DL (ref 0–0.3)
BILIRUB SERPL-MCNC: 0.4 MG/DL
PROT SERPL-MCNC: 6.4 G/DL (ref 6.4–8.3)
T3 SERPL-MCNC: 354 NG/DL (ref 85–202)
T4 SERPL-MCNC: 21.5 UG/DL (ref 4.5–11.7)

## 2023-06-12 PROCEDURE — 99000 SPECIMEN HANDLING OFFICE-LAB: CPT

## 2023-06-12 PROCEDURE — 80076 HEPATIC FUNCTION PANEL: CPT

## 2023-06-12 PROCEDURE — 83520 IMMUNOASSAY QUANT NOS NONAB: CPT | Mod: 90

## 2023-06-12 PROCEDURE — 84445 ASSAY OF TSI GLOBULIN: CPT | Mod: 90

## 2023-06-12 PROCEDURE — 84480 ASSAY TRIIODOTHYRONINE (T3): CPT

## 2023-06-12 PROCEDURE — 36415 COLL VENOUS BLD VENIPUNCTURE: CPT

## 2023-06-12 PROCEDURE — 84436 ASSAY OF TOTAL THYROXINE: CPT

## 2023-06-13 DIAGNOSIS — E05.90 HYPERTHYROIDISM: ICD-10-CM

## 2023-06-13 DIAGNOSIS — E05.90 HYPERTHYROIDISM: Primary | ICD-10-CM

## 2023-06-13 RX ORDER — METHIMAZOLE 5 MG/1
5 TABLET ORAL DAILY
Qty: 30 TABLET | Refills: 4 | Status: SHIPPED | OUTPATIENT
Start: 2023-06-13 | End: 2023-07-18

## 2023-06-14 LAB — TSH RECEP AB SER-ACNC: 21 IU/L (ref 0–1.75)

## 2023-06-20 ENCOUNTER — ANCILLARY PROCEDURE (OUTPATIENT)
Dept: ULTRASOUND IMAGING | Facility: HOSPITAL | Age: 28
End: 2023-06-20
Attending: OBSTETRICS & GYNECOLOGY
Payer: COMMERCIAL

## 2023-06-20 ENCOUNTER — PRENATAL OFFICE VISIT (OUTPATIENT)
Dept: FAMILY MEDICINE | Facility: CLINIC | Age: 28
End: 2023-06-20
Payer: COMMERCIAL

## 2023-06-20 ENCOUNTER — OFFICE VISIT (OUTPATIENT)
Dept: MATERNAL FETAL MEDICINE | Facility: HOSPITAL | Age: 28
End: 2023-06-20
Attending: OBSTETRICS & GYNECOLOGY
Payer: COMMERCIAL

## 2023-06-20 VITALS
BODY MASS INDEX: 23.51 KG/M2 | DIASTOLIC BLOOD PRESSURE: 76 MMHG | HEIGHT: 58 IN | HEART RATE: 100 BPM | SYSTOLIC BLOOD PRESSURE: 119 MMHG | OXYGEN SATURATION: 99 % | TEMPERATURE: 97.5 F | RESPIRATION RATE: 18 BRPM | WEIGHT: 112 LBS

## 2023-06-20 DIAGNOSIS — E05.90 HYPERTHYROIDISM AFFECTING PREGNANCY IN THIRD TRIMESTER: ICD-10-CM

## 2023-06-20 DIAGNOSIS — O09.90 SUPERVISION OF HIGH RISK PREGNANCY, ANTEPARTUM: Primary | ICD-10-CM

## 2023-06-20 DIAGNOSIS — O10.913 PRE-EXISTING HYPERTENSION DURING PREGNANCY IN THIRD TRIMESTER, UNSPECIFIED PRE-EXISTING HYPERTENSION TYPE: ICD-10-CM

## 2023-06-20 DIAGNOSIS — E05.00 GRAVES DISEASE: ICD-10-CM

## 2023-06-20 DIAGNOSIS — E05.90 HYPERTHYROIDISM: ICD-10-CM

## 2023-06-20 DIAGNOSIS — E05.90 HYPERTHYROIDISM AFFECTING PREGNANCY IN THIRD TRIMESTER: Primary | ICD-10-CM

## 2023-06-20 DIAGNOSIS — O99.283 HYPERTHYROIDISM AFFECTING PREGNANCY IN THIRD TRIMESTER: ICD-10-CM

## 2023-06-20 DIAGNOSIS — O99.283 HYPERTHYROIDISM AFFECTING PREGNANCY IN THIRD TRIMESTER: Primary | ICD-10-CM

## 2023-06-20 LAB
ALBUMIN UR-MCNC: NEGATIVE MG/DL
ALT SERPL W P-5'-P-CCNC: 8 U/L (ref 0–50)
AST SERPL W P-5'-P-CCNC: 15 U/L (ref 0–45)
ERYTHROCYTE [DISTWIDTH] IN BLOOD BY AUTOMATED COUNT: 11.7 % (ref 10–15)
GLUCOSE 1H P 50 G GLC PO SERPL-MCNC: 128 MG/DL (ref 70–129)
GLUCOSE UR STRIP-MCNC: NEGATIVE MG/DL
HCT VFR BLD AUTO: 36.3 % (ref 35–47)
HGB BLD-MCNC: 11.6 G/DL (ref 11.7–15.7)
KETONES UR STRIP-MCNC: NEGATIVE MG/DL
MCH RBC QN AUTO: 26.5 PG (ref 26.5–33)
MCHC RBC AUTO-ENTMCNC: 32 G/DL (ref 31.5–36.5)
MCV RBC AUTO: 83 FL (ref 78–100)
PLATELET # BLD AUTO: 202 10E3/UL (ref 150–450)
RBC # BLD AUTO: 4.38 10E6/UL (ref 3.8–5.2)
T PALLIDUM AB SER QL: NONREACTIVE
TSI SER-ACNC: 4 TSI INDEX
WBC # BLD AUTO: 10.8 10E3/UL (ref 4–11)

## 2023-06-20 PROCEDURE — 99214 OFFICE O/P EST MOD 30 MIN: CPT | Mod: 25 | Performed by: OBSTETRICS & GYNECOLOGY

## 2023-06-20 PROCEDURE — 82950 GLUCOSE TEST: CPT | Performed by: FAMILY MEDICINE

## 2023-06-20 PROCEDURE — 76816 OB US FOLLOW-UP PER FETUS: CPT

## 2023-06-20 PROCEDURE — 86780 TREPONEMA PALLIDUM: CPT | Performed by: FAMILY MEDICINE

## 2023-06-20 PROCEDURE — 90715 TDAP VACCINE 7 YRS/> IM: CPT | Performed by: FAMILY MEDICINE

## 2023-06-20 PROCEDURE — 81003 URINALYSIS AUTO W/O SCOPE: CPT | Performed by: FAMILY MEDICINE

## 2023-06-20 PROCEDURE — 76816 OB US FOLLOW-UP PER FETUS: CPT | Mod: 26 | Performed by: OBSTETRICS & GYNECOLOGY

## 2023-06-20 PROCEDURE — 84450 TRANSFERASE (AST) (SGOT): CPT | Performed by: FAMILY MEDICINE

## 2023-06-20 PROCEDURE — 36415 COLL VENOUS BLD VENIPUNCTURE: CPT | Performed by: FAMILY MEDICINE

## 2023-06-20 PROCEDURE — 99207 PR COMPLICATED OB VISIT: CPT | Performed by: FAMILY MEDICINE

## 2023-06-20 PROCEDURE — 90471 IMMUNIZATION ADMIN: CPT | Performed by: FAMILY MEDICINE

## 2023-06-20 PROCEDURE — 85027 COMPLETE CBC AUTOMATED: CPT | Performed by: FAMILY MEDICINE

## 2023-06-20 PROCEDURE — 84460 ALANINE AMINO (ALT) (SGPT): CPT | Performed by: FAMILY MEDICINE

## 2023-06-20 RX ORDER — PROPRANOLOL HYDROCHLORIDE 160 MG/1
160 CAPSULE, EXTENDED RELEASE ORAL DAILY
Qty: 90 CAPSULE | Refills: 1 | Status: SHIPPED | OUTPATIENT
Start: 2023-06-20 | End: 2024-03-09

## 2023-06-20 RX ORDER — PRENATAL VIT/IRON FUM/FOLIC AC 27MG-0.8MG
1 TABLET ORAL DAILY
Qty: 90 TABLET | Refills: 3 | Status: ON HOLD | OUTPATIENT
Start: 2023-06-20 | End: 2023-08-18

## 2023-06-20 NOTE — PROGRESS NOTES
"Please see \"Imaging\" tab under \"Chart Review\" for details of today's visit, which is summarized below:    Impression  =========    1) Hughes intrauterine pregnancy at 28w 6d gestational age.  2) None of the anomalies commonly detected by ultrasound were evident in the fetal anatomic survey described above.  3) Growth parameters and estimated fetal weight were consistent with an appropriate for gestation age pattern of growth.  4) The amniotic fluid volume appeared normal.    Recommendation  ==============    We discussed the findings on today's ultrasound with the patient.    Yusuf continues to use vaginal progesterone for prior finding of cervical shortening on US and history of PTD x 3. She will plan to continue this through her 36th week. No additional CL US are indicated at this gestational age. Yusuf continues to have elevated thyroid function studies and she continues to work with her endocrinology  team to titrate her methimazone dose for a goal of total T4 level ~ 1.5 times the upper limit of normal of the non-pregnant range. Delivery is recommended at 37 weeks as long as BP remains well controlled and Yusuf does not have symptoms of overt hyperthyroidism.     A repeat ultrasound has been scheduled here in 4 weeks to reevaluate fetal growth and anatomy and will begin  surveillance with weekly BPP at that time as well.    Return to primary provider for continued prenatal care.    Thank you for the opportunity to participate in the care of this patient. If you have questions regarding today's evaluation or if we can be of further service, please contact the [facility].    **Fetal anomalies may be present but not detected**    I spent a total of 30 minutes on the date of this encounter including preparing to see the patient (reviewing medical records/tests), in direct face-to-face contact with the patient during her visit with the majority spent counseling and discussing the plan of " care and documenting the visit in the electronic medical record. Please see note for details.    Marion Figueroa

## 2023-06-20 NOTE — PROGRESS NOTES
"Subjective:  27 year old  at 28w6d  No ctx, lof, bleeding, or dc.  No HA or vision changes.  Had a HA when changing from PTU to methimazole, but that resolved.  Outpatient Medications Prior to Visit   Medication Sig Dispense Refill     aspirin (ASA) 81 MG EC tablet Take 1 tablet (81 mg) by mouth daily 90 tablet 2     methimazole (TAPAZOLE) 5 MG tablet Take 1 tablet (5 mg) by mouth daily 30 tablet 4     progesterone (PROMETRIUM) 200 MG capsule Place 1 capsule (200 mg) vaginally At Bedtime 90 capsule 1     Prenatal Vit-Fe Fumarate-FA (PRENATAL MULTIVITAMIN W/IRON) 27-0.8 MG tablet Take 1 tablet by mouth daily 90 tablet 3     propranolol ER (INDERAL LA) 160 MG 24 hr capsule Take 1 capsule (160 mg) by mouth daily 30 capsule 2     No facility-administered medications prior to visit.      History   Smoking Status     Former     Years: 1.00     Types: Cigarettes   Smokeless Tobacco     Former     Quit date: 10/14/2021      Objective:  /76 (BP Location: Left arm, Patient Position: Sitting, Cuff Size: Adult Regular)   Pulse 100   Temp 97.5  F (36.4  C) (Temporal)   Resp 18   Ht 1.473 m (4' 10\")   Wt 50.8 kg (112 lb)   LMP 11/15/2022 (Within Weeks)   SpO2 99%   BMI 23.41 kg/m    Total weight gain: 3.629 kg (8 lb)   GENERAL: alert, not distressed  CHEST: clear, no rales, rhonchi, or wheezes  CARDIAC: regular without murmur, gallop, or rub  ABDOMEN: gravid, soft, non tender, non distended    Recent Results (from the past 24 hour(s))   Urinalysis, OB Screen    Collection Time: 23  8:27 AM   Result Value Ref Range    Glucose Urine Negative Negative mg/dL    Ketones Urine Negative Negative mg/dL    Protein Albumin Urine Negative Negative mg/dL      Assessment and Plan:   1. Supervision of high risk pregnancy, antepartum  Weight gain is a little less than recommended.  Patient feels like she is eating a lot.  Fundal height lagging, so concern for FGR.  Patient has a growth ultrasound scheduled today.  - " Prenatal Vit-Fe Fumarate-FA (PRENATAL MULTIVITAMIN W/IRON) 27-0.8 MG tablet; Take 1 tablet by mouth daily  Dispense: 90 tablet; Refill: 3  - Glucose tolerance, gest screen, 1 hour; Future  - Treponema Abs w Reflex to RPR and Titer; Future  - AST; Future  - ALT; Future  - CBC with platelets; Future    2. Hyperthyroidism  3. Graves disease  On methimazole with recent dose change from Endocrinology.  - propranolol ER (INDERAL LA) 160 MG 24 hr capsule; Take 1 capsule (160 mg) by mouth daily  Dispense: 90 capsule; Refill: 1     Chronic HTN  On propranolol, mostly for headache tachycardia presumably from Graves'.  Getting AST and ALT for baseline labs now.  [facility] has recommended growth ultrasounds every month and recommend BPP's weekly starting at 32 weeks.    Shortened cervix with hx of  deliveries.  Currently using vaginal progesterone as first ultrasound reported Cervical Length 2.5 cm.  Follow up ultrasound 3.7 cm but has continued progesterone.  Pregnancies are closely spaced with last delivery 22  Assess need to continue this with ultrasound.    Tdap vaccine today.  She declined COVID-vaccine.    Return in 1 month (on 2023) for prenatal care.   Future Appointments   Date Time Provider Department Center   2023 11:00 AM OSMIN ABBOTT  1 [facility]   2023 11:30 AM [facility]   2023  8:00 AM Victorino White MD [facility]   2023 11:00 AM [facility]   2023  8:00 AM Victorino White MD [facility]   2023  8:40 AM Victorino White MD [facility]   8/15/2023  8:00 AM Victorino White MD [facility]   2023  8:00 AM Victorino White MD [facility]   2023  8:00 AM Victorino White MD [facility]   2023  8:00 AM Victorino [facility] MD [facility]   2023  1:00 PM [facility]   10/17/2023  8:00 AM [facility]

## 2023-06-20 NOTE — PATIENT INSTRUCTIONS
Kick Counts  It s normal to worry about your baby s health. One way you can know your baby s doing well is to record the baby s movements once a day. This is called a kick count.   Remember to take your kick count records to all your appointments with your healthcare provider.  How to count kicks    Time how long it takes you to feel 10 kicks, flutters, swishes, or rolls. Ideally, you want to feel at least 10 movements in 2 hours. You will likely feel 10 movements in less time than that.  Starting at 28 weeks, count your baby's movements daily. Follow your healthcare provider's instructions for kick counting. Here are tips for counting kicks:    Choose a time when the baby is active, such as after a meal.     Sit comfortably or lie on your side.     The first time the baby moves, write down the time.     Count each movement until the baby has moved  10 times. This can take from 20 minutes to 2 hours.     If you haven't felt 10 kicks by the end of the second hour, wait a few hours. Then try again.    Try to do it at the same time each day.  When to call your healthcare provider  Call your healthcare provider  right away if:    You do a couple sets of kick counts during the day and your baby moves fewer than 10 times in 2 hours.    Your baby moves much less often than on the days before.    You haven't felt your baby move all day.  [employer] last reviewed this educational content on 2020-2022 [employer]. All rights reserved. This information is not intended as a substitute for professional medical care. Always follow your healthcare professional's instructions.          Understanding  Labor  Going into labor before week 37 of pregnancy is called  labor.  labor can cause your baby to be born too soon. This can lead to health problems for your baby.     Before labor, the cervix is thick and closed.      In  labor, the cervix begins to efface (thin) and dilate  (open).     Symptoms of  labor  If you think you re having  labor, get medical help right away. Contractions alone don t mean you re in  labor. What matters more are changes in your cervix. The cervix is the opening at the lower end of the uterus. Symptoms of  labor include:    4 or more contractions per hour    Strong contractions    Constant menstrual-like cramping    Low-back pain    Mucous or bloody fluid from the vagina    Bleeding or spotting in the second or third trimester  Evaluating  labor  Your healthcare provider will try to find out if you re in  labor or just having contractions. They may watch you for a few hours. You may have these tests:    Pelvic exam. This is to see if your cervix has effaced (thinned) and dilated (opened).    Uterine activity monitoring. This is used to detect contractions.    Fetal monitoring. This is done to check the health of your baby.    Ultrasound. This test looks at your baby s size and position.    Amniocentesis. This test checks how mature your baby s lungs are.  Caring for yourself at home  If you have  contractions, but your cervix is still thick and closed, your healthcare provider may tell you to:    Drink plenty of water.    Do fewer activities.    Rest in bed on your side.    Don't have intercourse or stimulate your nipples.  When to call your healthcare provider  Call your healthcare provider if you have any of these:    4 or more contractions per hour    Bag of water breaks    Bleeding or spotting  If you need hospital care   labor often means that you need hospital care. You may need complete bed rest. You may have an IV (intravenous) line in your arm or hand. This is to give you fluids. You may be given pills or injections. These are done to help prevent contractions. You may get a medicine called a corticosteroid. This is to help your baby s lungs mature more quickly.  Are you at risk?  Any pregnant  woman can have  labor. It may start for no reason. But these risk factors can increase your chances:    Past  labor or early birth    Smoking, drug, or alcohol use in pregnancy    A multiple pregnancy (twins or more)    Problems with the shape of the uterus    Bleeding during the pregnancy  The dangers of  birth  A baby born too soon may have health problems. This is because the baby didn t have enough time to grow. Some of the risks for your baby include:    Not breastfeeding or feeding well    Having immature lungs    Bleeding in the brain    Death  Reaching term  Your goal is to get as close to term (week 37 or later) as you can before giving birth. The closer you get to term, the higher your chance of having a healthy baby. Work with your healthcare provider. Together, you can take steps that may keep you from giving birth too early.  Miguel last reviewed this educational content on 10/1/2021    6377-9669 [employer]. All rights reserved. This information is not intended as a substitute for professional medical care. Always follow your healthcare professional's instructions.

## 2023-07-18 ENCOUNTER — ANCILLARY PROCEDURE (OUTPATIENT)
Dept: ULTRASOUND IMAGING | Facility: HOSPITAL | Age: 28
End: 2023-07-18
Attending: OBSTETRICS & GYNECOLOGY
Payer: COMMERCIAL

## 2023-07-18 ENCOUNTER — PRENATAL OFFICE VISIT (OUTPATIENT)
Dept: FAMILY MEDICINE | Facility: CLINIC | Age: 28
End: 2023-07-18
Payer: COMMERCIAL

## 2023-07-18 ENCOUNTER — OFFICE VISIT (OUTPATIENT)
Dept: MATERNAL FETAL MEDICINE | Facility: HOSPITAL | Age: 28
End: 2023-07-18
Attending: OBSTETRICS & GYNECOLOGY
Payer: COMMERCIAL

## 2023-07-18 VITALS
HEART RATE: 93 BPM | BODY MASS INDEX: 24.35 KG/M2 | TEMPERATURE: 98.2 F | OXYGEN SATURATION: 98 % | SYSTOLIC BLOOD PRESSURE: 127 MMHG | HEIGHT: 58 IN | RESPIRATION RATE: 20 BRPM | DIASTOLIC BLOOD PRESSURE: 75 MMHG | WEIGHT: 116 LBS

## 2023-07-18 DIAGNOSIS — O09.93 SUPERVISION OF HIGH RISK PREGNANCY IN THIRD TRIMESTER: Primary | ICD-10-CM

## 2023-07-18 DIAGNOSIS — E05.90 HYPERTHYROIDISM AFFECTING PREGNANCY IN THIRD TRIMESTER: ICD-10-CM

## 2023-07-18 DIAGNOSIS — O99.283 HYPERTHYROIDISM AFFECTING PREGNANCY IN THIRD TRIMESTER: ICD-10-CM

## 2023-07-18 DIAGNOSIS — O99.283 HYPERTHYROIDISM AFFECTING PREGNANCY IN THIRD TRIMESTER: Primary | ICD-10-CM

## 2023-07-18 DIAGNOSIS — O10.913 PRE-EXISTING HYPERTENSION DURING PREGNANCY IN THIRD TRIMESTER, UNSPECIFIED PRE-EXISTING HYPERTENSION TYPE: ICD-10-CM

## 2023-07-18 DIAGNOSIS — O10.919 CHRONIC HYPERTENSION IN PREGNANCY: ICD-10-CM

## 2023-07-18 DIAGNOSIS — E05.00 GRAVES DISEASE: ICD-10-CM

## 2023-07-18 DIAGNOSIS — E05.90 HYPERTHYROIDISM: ICD-10-CM

## 2023-07-18 DIAGNOSIS — E05.90 HYPERTHYROIDISM AFFECTING PREGNANCY IN THIRD TRIMESTER: Primary | ICD-10-CM

## 2023-07-18 LAB
ALBUMIN SERPL BCG-MCNC: 3.5 G/DL (ref 3.5–5.2)
ALBUMIN UR-MCNC: NEGATIVE MG/DL
ALP SERPL-CCNC: 190 U/L (ref 35–104)
ALT SERPL W P-5'-P-CCNC: 7 U/L (ref 0–50)
AST SERPL W P-5'-P-CCNC: 19 U/L (ref 0–45)
BILIRUB DIRECT SERPL-MCNC: <0.2 MG/DL (ref 0–0.3)
BILIRUB SERPL-MCNC: 0.3 MG/DL
GLUCOSE UR STRIP-MCNC: NEGATIVE MG/DL
KETONES UR STRIP-MCNC: NEGATIVE MG/DL
PROT SERPL-MCNC: 6.5 G/DL (ref 6.4–8.3)
T3 SERPL-MCNC: 334 NG/DL (ref 85–202)
T4 SERPL-MCNC: 19.8 UG/DL (ref 4.5–11.7)

## 2023-07-18 PROCEDURE — 99207 PR COMPLICATED OB VISIT: CPT | Performed by: FAMILY MEDICINE

## 2023-07-18 PROCEDURE — 76816 OB US FOLLOW-UP PER FETUS: CPT | Mod: 26 | Performed by: OBSTETRICS & GYNECOLOGY

## 2023-07-18 PROCEDURE — 99207 PR NO CHARGE LOS: CPT | Performed by: OBSTETRICS & GYNECOLOGY

## 2023-07-18 PROCEDURE — 80076 HEPATIC FUNCTION PANEL: CPT | Performed by: FAMILY MEDICINE

## 2023-07-18 PROCEDURE — 81003 URINALYSIS AUTO W/O SCOPE: CPT | Performed by: FAMILY MEDICINE

## 2023-07-18 PROCEDURE — 36415 COLL VENOUS BLD VENIPUNCTURE: CPT | Performed by: FAMILY MEDICINE

## 2023-07-18 PROCEDURE — 76819 FETAL BIOPHYS PROFIL W/O NST: CPT | Mod: 26 | Performed by: OBSTETRICS & GYNECOLOGY

## 2023-07-18 PROCEDURE — 84436 ASSAY OF TOTAL THYROXINE: CPT | Performed by: FAMILY MEDICINE

## 2023-07-18 PROCEDURE — 76819 FETAL BIOPHYS PROFIL W/O NST: CPT

## 2023-07-18 PROCEDURE — 84480 ASSAY TRIIODOTHYRONINE (T3): CPT | Performed by: FAMILY MEDICINE

## 2023-07-18 RX ORDER — METHIMAZOLE 5 MG/1
5 TABLET ORAL DAILY
Qty: 90 TABLET | Refills: 0 | Status: SHIPPED | OUTPATIENT
Start: 2023-07-18 | End: 2023-07-21

## 2023-07-18 NOTE — PROGRESS NOTES
"Subjective:  27 year old  at 32w6d    Had a few ctx.  Always stop.  No lof, bleeding.  Has had physiologic discharge.  No HA or vision changes.    Continues follow up with endocrine and MFM.  Ultrasound scheduled later toady.    Eating a lot  Hear rate feels ok.  Taking methimazole and propanolol.    Outpatient Medications Prior to Visit   Medication Sig Dispense Refill     aspirin (ASA) 81 MG EC tablet Take 1 tablet (81 mg) by mouth daily 90 tablet 2     Prenatal Vit-Fe Fumarate-FA (PRENATAL MULTIVITAMIN W/IRON) 27-0.8 MG tablet Take 1 tablet by mouth daily 90 tablet 3     progesterone (PROMETRIUM) 200 MG capsule Place 1 capsule (200 mg) vaginally At Bedtime 90 capsule 1     propranolol ER (INDERAL LA) 160 MG 24 hr capsule Take 1 capsule (160 mg) by mouth daily 90 capsule 1     methimazole (TAPAZOLE) 5 MG tablet Take 1 tablet (5 mg) by mouth daily 30 tablet 4     No facility-administered medications prior to visit.      History   Smoking Status     Former     Years: 1.00     Types: Cigarettes   Smokeless Tobacco     Former     Quit date: 10/14/2021      Objective:  /75 (BP Location: Right arm, Patient Position: Sitting, Cuff Size: Adult Small)   Pulse 93   Temp 98.2  F (36.8  C) (Temporal)   Resp 20   Ht 1.475 m (4' 10.07\")   Wt 52.6 kg (116 lb)   LMP 11/15/2022 (Within Weeks)   SpO2 98%   BMI 24.18 kg/m    Total weight gain: 5.443 kg (12 lb)   TWG gain is a little less than recommended, but rate is good    GENERAL: alert, not distressed  ABDOMEN: gravid, soft, non tender, non distended    Recent Results (from the past 24 hour(s))   Urinalysis, OB Screen    Collection Time: 23  7:51 AM   Result Value Ref Range    Glucose Urine Negative Negative mg/dL    Ketones Urine Negative Negative mg/dL    Protein Albumin Urine Negative Negative mg/dL      Assessment and Plan:   Encounter for supervision of normal pregnancy in third trimester  Measures small for dates, suspicious for growth " restriction.  Has growth ultrasound later today.  - Urinalysis, OB Screen    Hyperthyroidism, Graves  Will continue med and follow up with Endocrinology.  Thyrotropin Receptor Antibody remains positive, so infant at risk of  Graves.  - methimazole (TAPAZOLE) 5 MG tablet; Take 1 tablet (5 mg) by mouth daily  Dispense: 90 tablet; Refill: 0     Chronic HTN  On propranolol, mostly for headache tachycardia presumably from Graves'.  Growth ultrasounds every month and recommend BPP's weekly starting at 32 weeks.  Delivery at 37 weeks, per MF.     Shortened cervix with hx of  deliveries.  first ultrasound reported Cervical Length 2.5 cm.  Started vaginal progesterone  Follow up ultrasound 3.7 cm but has continued progesterone.  Pregnancies are closely spaced with last delivery 22  Will continue progesterone until     No follow-ups on file.   Future Appointments   Date Time Provider Department Center   2023  9:30 AM [facility]   [facility]   [facility] 11:00 AM [facility]   2023  3:30 PM [facility]   2023  4:00 PM [facility]   2023  8:00 AM [facility]   [facility]  [facility]   2023  4:00 PM [facility]   2023  8:40 AM Victorino White MD [facility]   8/15/2023  8:00 AM Victorino White MD [facility]   2023  8:00 AM Victorino White MD [facility]   2023  8:00 AM Victorino White MD [facility]   2023  8:00 AM [facility]   2023  1:00 PM [facility]   10/17/2023  8:00 AM [facility]

## 2023-07-18 NOTE — NURSING NOTE
Yusuf Stubbs is a  at 32w6d who presents to [facility] for scheduled follow up ultrasound. Pt reports positive fetal movement. Pt denies bldg/lof/change in discharge, contractions, headache, vision changes, chest pain/SOB or edema. SBAR given to Dr. Figueroa, see note in Epic.

## 2023-07-21 ENCOUNTER — OFFICE VISIT (OUTPATIENT)
Dept: ENDOCRINOLOGY | Facility: CLINIC | Age: 28
End: 2023-07-21
Payer: COMMERCIAL

## 2023-07-21 VITALS
SYSTOLIC BLOOD PRESSURE: 102 MMHG | WEIGHT: 117.5 LBS | OXYGEN SATURATION: 98 % | HEART RATE: 105 BPM | BODY MASS INDEX: 24.5 KG/M2 | DIASTOLIC BLOOD PRESSURE: 60 MMHG

## 2023-07-21 DIAGNOSIS — E05.90 HYPERTHYROIDISM: ICD-10-CM

## 2023-07-21 PROCEDURE — 99214 OFFICE O/P EST MOD 30 MIN: CPT | Performed by: INTERNAL MEDICINE

## 2023-07-21 RX ORDER — METHIMAZOLE 5 MG/1
7.5 TABLET ORAL DAILY
Qty: 135 TABLET | Refills: 1 | Status: SHIPPED | OUTPATIENT
Start: 2023-07-21 | End: 2023-09-29

## 2023-07-21 NOTE — PATIENT INSTRUCTIONS
-Increase methimazole to 7.5 mg daily (ONE AND A HALF of 5 mg tablet)  -Continue propranolol 160 mg once daily  -Labs in 3 weeks  -Return for a follow-up visit 2 months to review long term plans for treatment  -We will communicate results via [employer], or if needed by phone

## 2023-07-21 NOTE — PROGRESS NOTES
ENDOCRINOLOGY FOLLOW-UP         HISTORY OF PRESENT ILLNESS    Yusuf Stubbs is seen in follow-up.    Currently at 33 weeks +2 days of gestation and EDC of 2023.    At last visit in 2023, we transitioned from PTU to methimazole.  We have been adjusting methimazole dose in the interim.  She is presently taking methimazole 5 mg daily as of around 2023.    Also taking propranolol extended release 160 mg daily.    Finds her energy is fairly good.  No tremors, no palpitations.  No lower extremity edema.    Has gained weight in the interim since last visit: 104 pounds in 2023, 117 pounds today.    Has some blurry vision and occasional eye irritation and redness.    To date, she has had 3 pregnancies where infant had  hyperthyroidism.    We discussed her long-term plans for conception: She was contemplating having tubal ligation but has decided against it since she does not want to rule out the possibility of conceiving in the future.  However, she plans to start OCP after delivery and does not have immediate plans for pregnancy.    She has also given thought to permanent treatment options: Has some anxiety about surgery and would like to consider another dose of radioactive iodine if possible.  She recalls that her first dose of radioactive iodine was given while she had infants at home: She had family members help care for her children.    Pertinent endocrine and related history:  1.  Hypothyroidism, due to Graves' disease.  Was initially diagnosed with hyperthyroidism secondary to Graves Disease in . She had been pregnant at the time. She was started on methimazole 15 mg daily. At the time of initial presentation she had been experiencing weight loss of 20 pounds, palpitations, heat intolerance and anxiety.   -She followed with endocrinology and was treated through another pregnancy in 2017.  She recalls that both of the children she had with hyperthyroidism during pregnancy ultimately needed  treatment with methimazole in infancy.    -Radioactive iodine ablation was contemplated (initially deferred due to concern for worsening of thyroid eye disease) and ultimately pursued due to patient preference: Radioactive iodine uptake scan revealed an 80% uptake. She underwent radioactive iodine ablation on 2019 and received 14.6 mCi.   -She was last seen by Endocrinology at [facility] in  by Dr. Sharma at which time the patient was prescribed methimazole (the medication was resumed due to persistent hyperthyroidism after radioiodine therapy).  2.  History of thyroid related eye disease.  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    PAST MEDICAL HISTORY  Past Medical History:   Diagnosis Date     Endocrine disease      Hypertension      Hyperthyroidism 2016     Lab test positive for detection of COVID-19 virus 2021     Normal delivery 2022     Precipitous delivery, delivered (current hospitalization) 2016      delivery 2016     Unspecified fetal growth retardation, unspecified (weight) 2013     Urinary tract infection        MEDICATIONS  Current Outpatient Medications   Medication Sig Dispense Refill     aspirin (ASA) 81 MG EC tablet Take 1 tablet (81 mg) by mouth daily 90 tablet 2     methimazole (TAPAZOLE) 5 MG tablet Take 1 tablet (5 mg) by mouth daily 90 tablet 0     Prenatal Vit-Fe Fumarate-FA (PRENATAL MULTIVITAMIN W/IRON) 27-0.8 MG tablet Take 1 tablet by mouth daily 90 tablet 3     progesterone (PROMETRIUM) 200 MG capsule Place 1 capsule (200 mg) vaginally At Bedtime 90 capsule 1     propranolol ER (INDERAL LA) 160 MG 24 hr capsule Take 1 capsule (160 mg) by mouth daily 90 capsule 1       Allergies, family, and social history were reviewed and documented as needed in EHR.     REVIEW OF SYSTEMS  A focused ROS was performed, with pertinent positives and negatives as  noted in the HPI.    PHYSICAL EXAM  /60 (BP Location: Right arm, Patient Position: Sitting, Cuff Size: Adult Regular)   Pulse 105   Wt 53.3 kg (117 lb 8 oz)   LMP 11/15/2022 (Within Weeks)   SpO2 98%   BMI 24.50 kg/m    Body mass index is 24.5 kg/m .  Constitutional: Vital signs reviewed, as recorded above. Patient is alert, oriented and appears in no acute distress.  Eyes: Mild prominence of the eyes, left greater than right; no stare or lid lag or retraction, no conjunctival injection.    Neck: Neck supple, thyroid about 1.5 times the size of normal, without palpable nodules.  No tenderness on today's exam.  Cardiovascular: Irregular rhythm, normal rate, no lower extremity edema.  Respiratory: CTAB, without wheezes, crackles or rhonchi; normal chest wall motion and respiratory effort.  GI: Gravid.  MSK: No clubbing or cyanosis; normal muscle bulk and tone.  Skin: Normal skin color, temperature, turgor and texture.  Neurological: Alert and oriented times 3. No tremor.    DATA REVIEW  Each of the following laboratory and/or imaging studies were reviewed.        Component      Latest Ref Rng 6/12/2023  4:11 PM 7/18/2023  8:25 AM   Protein Total      6.4 - 8.3 g/dL 6.4  6.5    Albumin      3.5 - 5.2 g/dL 3.4 (L)  3.5    Bilirubin Total      <=1.2 mg/dL 0.4  0.3    Alkaline Phosphatase      35 - 104 U/L 161 (H)  190 (H)    AST      0 - 45 U/L 16  19    ALT      0 - 50 U/L 6 (L)  7    Bilirubin Direct      0.00 - 0.30 mg/dL <0.20  <0.20    T4 Total      4.5 - 11.7 ug/dL 21.5 (H)  19.8 (H)    Triiodothyronine (T3)      85 - 202 ng/dL 354 (H)  334 (H)    Thyrotropin Receptor Antibody      0.00 - 1.75 IU/L 21 (H)     Thyroid Stim Immunog      <=1.3 TSI index 4.0 (H)        Legend:  (L) Low  (H) High    ASSESSMENT  1.  Hyperthyroidism.  Due to Graves' disease.  Initiated PTU in 2/2023, transition to methimazole in 4/2023.  Clinically feeling better, less thyrotoxic symptoms.  Thyroid function tests continue to  improve.  Total T4 and T3 are just outside our goal range: Would adjust methimazole dose slightly upward.  Planning induction at 37 weeks, we will plan on follow-up about 1 month after delivery.  We discussed options for definitive therapy in the future: Would consider surgical treatment given history of eye disease.  However, patient has reservations about surgery and would prefer radioiodine therapy.  Has history of thyroid eye disease but appears to have chronic inactive eye disease, if we contemplate radioiodine therapy would need to check with ophthalmology whether steroids may be of any benefit in this scenario; if indeed we pursue radioiodine therapy, patient cannot be breast-feeding and will need to maintain strict precautions since she has young children at home.  We will decide whether this is feasible in the short-term or if we ought to delay when we see each other next.    2.  Graves' disease.  Seen in ophthalmology.  Lateral orbital decompression was contemplated but the patient had deferred surgery for the short-term.  TSI and TSH receptor antibody were drawn in 2023: Both positive.  Risk for fetal/ hyperthyroidism: Updated obstetrics team.      3.  History of tobacco use.  Has quit tobacco use.      4.  Irregular rhythm.  Noted on exam at prior visit, EKG with PVCs.  Address hyperthyroidism as above.      5.  Pregnancy.     PLAN  -Increase methimazole to 7.5 mg daily (ONE AND A HALF of 5 mg tablet)  -Continue propranolol 160 mg once daily  -Labs in 3 weeks  -Return for a follow-up visit 2 months to review long term plans for treatment  -We will communicate results via [employer], or if needed by phone    Orders Placed This Encounter   Procedures     TSH     Thyroxine total     T3 total       I spent a total of 38 minutes on the date of encounter reviewing medical records, evaluating the patient, coordinating care and documenting in the EHR, as detailed above.      Matthew Echols MD   Division  of Diabetes, Endocrinology and Metabolism  Department of Medicine

## 2023-07-21 NOTE — LETTER
2023         RE: Yusuf Stubbs  [address] [place] [address]        Dear Colleague,    Thank you for referring your patient, Yusuf Stubbs, to the [facility]. Please see a copy of my visit note below.      ENDOCRINOLOGY FOLLOW-UP         HISTORY OF PRESENT ILLNESS    Yusuf Stubbs is seen in follow-up.    Currently at 33 weeks +2 days of gestation and EDC of 2023.    At last visit in 2023, we transitioned from PTU to methimazole.  We have been adjusting methimazole dose in the interim.  She is presently taking methimazole 5 mg daily as of around 2023.    Also taking propranolol extended release 160 mg daily.    Finds her energy is fairly good.  No tremors, no palpitations.  No lower extremity edema.    Has gained weight in the interim since last visit: 104 pounds in 2023, 117 pounds today.    Has some blurry vision and occasional eye irritation and redness.    To date, she has had 3 pregnancies where infant had  hyperthyroidism.    We discussed her long-term plans for conception: She was contemplating having tubal ligation but has decided against it since she does not want to rule out the possibility of conceiving in the future.  However, she plans to start OCP after delivery and does not have immediate plans for pregnancy.    She has also given thought to permanent treatment options: Has some anxiety about surgery and would like to consider another dose of radioactive iodine if possible.  She recalls that her first dose of radioactive iodine was given while she had infants at home: She had family members help care for her children.    Pertinent endocrine and related history:  1.  Hypothyroidism, due to Graves' disease.  Was initially diagnosed with hyperthyroidism secondary to Graves Disease in 2016. She had been pregnant at the time. She was started on methimazole 15 mg daily. At the time of initial presentation she had been experiencing weight loss of 20  pounds, palpitations, heat intolerance and anxiety.   -She followed with endocrinology and was treated through another pregnancy in 2017.  She recalls that both of the children she had with hyperthyroidism during pregnancy ultimately needed treatment with methimazole in infancy.    -Radioactive iodine ablation was contemplated (initially deferred due to concern for worsening of thyroid eye disease) and ultimately pursued due to patient preference: Radioactive iodine uptake scan revealed an 80% uptake. She underwent radioactive iodine ablation on 2019 and received 14.6 mCi.   -She was last seen by Endocrinology at [facility] in  by Dr. Sharma at which time the patient was prescribed methimazole (the medication was resumed due to persistent hyperthyroidism after radioiodine therapy).  2.  History of thyroid related eye disease.  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    PAST MEDICAL HISTORY  Past Medical History:   Diagnosis Date     Endocrine disease      Hypertension      Hyperthyroidism 2016     Lab test positive for detection of COVID-19 virus 2021     Normal delivery 2022     Precipitous delivery, delivered (current hospitalization) 2016      delivery 2016     Unspecified fetal growth retardation, unspecified (weight)      Urinary tract infection        MEDICATIONS  Current Outpatient Medications   Medication Sig Dispense Refill     aspirin (ASA) 81 MG EC tablet Take 1 tablet (81 mg) by mouth daily 90 tablet 2     methimazole (TAPAZOLE) 5 MG tablet Take 1 tablet (5 mg) by mouth daily 90 tablet 0     Prenatal Vit-Fe Fumarate-FA (PRENATAL MULTIVITAMIN W/IRON) 27-0.8 MG tablet Take 1 tablet by mouth daily 90 tablet 3     progesterone (PROMETRIUM) 200 MG capsule Place 1 capsule (200 mg) vaginally At Bedtime 90 capsule 1     propranolol ER (INDERAL LA) 160 MG 24 hr  capsule Take 1 capsule (160 mg) by mouth daily 90 capsule 1       Allergies, family, and social history were reviewed and documented as needed in EHR.     REVIEW OF SYSTEMS  A focused ROS was performed, with pertinent positives and negatives as noted in the HPI.    PHYSICAL EXAM  /60 (BP Location: Right arm, Patient Position: Sitting, Cuff Size: Adult Regular)   Pulse 105   Wt 53.3 kg (117 lb 8 oz)   LMP 11/15/2022 (Within Weeks)   SpO2 98%   BMI 24.50 kg/m    Body mass index is 24.5 kg/m .  Constitutional: Vital signs reviewed, as recorded above. Patient is alert, oriented and appears in no acute distress.  Eyes: Mild prominence of the eyes, left greater than right; no stare or lid lag or retraction, no conjunctival injection.    Neck: Neck supple, thyroid about 1.5 times the size of normal, without palpable nodules.  No tenderness on today's exam.  Cardiovascular: Irregular rhythm, normal rate, no lower extremity edema.  Respiratory: CTAB, without wheezes, crackles or rhonchi; normal chest wall motion and respiratory effort.  GI: Gravid.  MSK: No clubbing or cyanosis; normal muscle bulk and tone.  Skin: Normal skin color, temperature, turgor and texture.  Neurological: Alert and oriented times 3. No tremor.    DATA REVIEW  Each of the following laboratory and/or imaging studies were reviewed.        Component      Latest Ref Rng 6/12/2023  4:11 PM 7/18/2023  8:25 AM   Protein Total      6.4 - 8.3 g/dL 6.4  6.5    Albumin      3.5 - 5.2 g/dL 3.4 (L)  3.5    Bilirubin Total      <=1.2 mg/dL 0.4  0.3    Alkaline Phosphatase      35 - 104 U/L 161 (H)  190 (H)    AST      0 - 45 U/L 16  19    ALT      0 - 50 U/L 6 (L)  7    Bilirubin Direct      0.00 - 0.30 mg/dL <0.20  <0.20    T4 Total      4.5 - 11.7 ug/dL 21.5 (H)  19.8 (H)    Triiodothyronine (T3)      85 - 202 ng/dL 354 (H)  334 (H)    Thyrotropin Receptor Antibody      0.00 - 1.75 IU/L 21 (H)     Thyroid Stim Immunog      <=1.3 TSI index 4.0 (H)         Legend:  (L) Low  (H) High    ASSESSMENT  1.  Hyperthyroidism.  Due to Graves' disease.  Initiated PTU in 2023, transition to methimazole in 2023.  Clinically feeling better, less thyrotoxic symptoms.  Thyroid function tests continue to improve.  Total T4 and T3 are just outside our goal range: Would adjust methimazole dose slightly upward.  Planning induction at 37 weeks, we will plan on follow-up about 1 month after delivery.  We discussed options for definitive therapy in the future: Would consider surgical treatment given history of eye disease.  However, patient has reservations about surgery and would prefer radioiodine therapy.  Has history of thyroid eye disease but appears to have chronic inactive eye disease, if we contemplate radioiodine therapy would need to check with ophthalmology whether steroids may be of any benefit in this scenario; if indeed we pursue radioiodine therapy, patient cannot be breast-feeding and will need to maintain strict precautions since she has young children at home.  We will decide whether this is feasible in the short-term or if we ought to delay when we see each other next.    2.  Graves' disease.  Seen in ophthalmology.  Lateral orbital decompression was contemplated but the patient had deferred surgery for the short-term.  TSI and TSH receptor antibody were drawn in 2023: Both positive.  Risk for fetal/ hyperthyroidism: Updated obstetrics team.      3.  History of tobacco use.  Has quit tobacco use.      4.  Irregular rhythm.  Noted on exam at prior visit, EKG with PVCs.  Address hyperthyroidism as above.      5.  Pregnancy.     PLAN  -Increase methimazole to 7.5 mg daily (ONE AND A HALF of 5 mg tablet)  -Continue propranolol 160 mg once daily  -Labs in 3 weeks  -Return for a follow-up visit 2 months to review long term plans for treatment  -We will communicate results via [employer], or if needed by phone    Orders Placed This Encounter   Procedures     TSH      Thyroxine total     T3 total       I spent a total of 38 minutes on the date of encounter reviewing medical records, evaluating the patient, coordinating care and documenting in the EHR, as detailed above.      Cortez Echols MD   Division of Diabetes, Endocrinology and Metabolism  Department of Medicine              Again, thank you for allowing me to participate in the care of your patient.        Sincerely,        CORTEZ Echols MD

## 2023-07-25 ENCOUNTER — ANCILLARY PROCEDURE (OUTPATIENT)
Dept: ULTRASOUND IMAGING | Facility: HOSPITAL | Age: 28
End: 2023-07-25
Attending: OBSTETRICS & GYNECOLOGY
Payer: COMMERCIAL

## 2023-07-25 ENCOUNTER — OFFICE VISIT (OUTPATIENT)
Dept: MATERNAL FETAL MEDICINE | Facility: HOSPITAL | Age: 28
End: 2023-07-25
Attending: OBSTETRICS & GYNECOLOGY
Payer: COMMERCIAL

## 2023-07-25 DIAGNOSIS — O10.913 PRE-EXISTING HYPERTENSION DURING PREGNANCY IN THIRD TRIMESTER, UNSPECIFIED PRE-EXISTING HYPERTENSION TYPE: ICD-10-CM

## 2023-07-25 DIAGNOSIS — O10.919 CHRONIC HYPERTENSION IN PREGNANCY: Primary | ICD-10-CM

## 2023-07-25 DIAGNOSIS — E05.90 HYPERTHYROIDISM AFFECTING PREGNANCY IN THIRD TRIMESTER: ICD-10-CM

## 2023-07-25 DIAGNOSIS — O99.283 HYPERTHYROIDISM AFFECTING PREGNANCY IN THIRD TRIMESTER: ICD-10-CM

## 2023-07-25 PROCEDURE — 76819 FETAL BIOPHYS PROFIL W/O NST: CPT

## 2023-07-25 PROCEDURE — 99207 PR NO CHARGE LOS: CPT | Performed by: STUDENT IN AN ORGANIZED HEALTH CARE EDUCATION/TRAINING PROGRAM

## 2023-07-25 PROCEDURE — 76819 FETAL BIOPHYS PROFIL W/O NST: CPT | Mod: 26 | Performed by: STUDENT IN AN ORGANIZED HEALTH CARE EDUCATION/TRAINING PROGRAM

## 2023-07-25 NOTE — NURSING NOTE
Yusuf Stubbs is a  at 33w6d who presents to [facility] for scheduled biophysical profile. Pt reports positive fetal movement. Pt denies bldg/lof/change in discharge, contractions, headache, vision changes, chest pain/SOB or edema. SBAR given to Dr. Ordonez, see note in Epic.

## 2023-07-25 NOTE — PROGRESS NOTES
Please see the full imaging report from the View[employer] program under the imaging tab.    Paulina Ordonez MD  Maternal Fetal Medicine

## 2023-07-28 ENCOUNTER — DOCUMENTATION ONLY (OUTPATIENT)
Dept: FAMILY MEDICINE | Facility: CLINIC | Age: 28
End: 2023-07-28
Payer: COMMERCIAL

## 2023-07-28 NOTE — PROGRESS NOTES
Discussed postdelivery plan with pediatric endocrinology.    Cord blood or DOL 1: Check thyroid receptor antibody  DOL 3-5: check free T4, TSH, TrAb  DOL 10-14: check free T4, TSH, TrAb     Monitor for temperature stability, tachycardia, feeding, weight gain, irritability.    Contact pediatric endocrinology with any abnormals.

## 2023-08-01 ENCOUNTER — ANCILLARY PROCEDURE (OUTPATIENT)
Dept: ULTRASOUND IMAGING | Facility: HOSPITAL | Age: 28
End: 2023-08-01
Attending: STUDENT IN AN ORGANIZED HEALTH CARE EDUCATION/TRAINING PROGRAM
Payer: COMMERCIAL

## 2023-08-01 ENCOUNTER — OFFICE VISIT (OUTPATIENT)
Dept: MATERNAL FETAL MEDICINE | Facility: HOSPITAL | Age: 28
End: 2023-08-01
Attending: STUDENT IN AN ORGANIZED HEALTH CARE EDUCATION/TRAINING PROGRAM
Payer: COMMERCIAL

## 2023-08-01 ENCOUNTER — PRENATAL OFFICE VISIT (OUTPATIENT)
Dept: FAMILY MEDICINE | Facility: CLINIC | Age: 28
End: 2023-08-01
Payer: COMMERCIAL

## 2023-08-01 VITALS
WEIGHT: 119 LBS | SYSTOLIC BLOOD PRESSURE: 124 MMHG | BODY MASS INDEX: 25.67 KG/M2 | DIASTOLIC BLOOD PRESSURE: 62 MMHG | RESPIRATION RATE: 20 BRPM | HEIGHT: 57 IN | OXYGEN SATURATION: 96 % | TEMPERATURE: 97.9 F | HEART RATE: 86 BPM

## 2023-08-01 DIAGNOSIS — E05.90 HYPERTHYROIDISM: ICD-10-CM

## 2023-08-01 DIAGNOSIS — E05.00 GRAVES DISEASE: ICD-10-CM

## 2023-08-01 DIAGNOSIS — E05.90 HYPERTHYROIDISM AFFECTING PREGNANCY IN THIRD TRIMESTER: ICD-10-CM

## 2023-08-01 DIAGNOSIS — O10.913 PRE-EXISTING HYPERTENSION DURING PREGNANCY IN THIRD TRIMESTER, UNSPECIFIED PRE-EXISTING HYPERTENSION TYPE: ICD-10-CM

## 2023-08-01 DIAGNOSIS — O09.90 SUPERVISION OF HIGH RISK PREGNANCY, ANTEPARTUM: ICD-10-CM

## 2023-08-01 DIAGNOSIS — O10.919 CHRONIC HYPERTENSION IN PREGNANCY: Primary | ICD-10-CM

## 2023-08-01 DIAGNOSIS — O99.283 HYPERTHYROIDISM AFFECTING PREGNANCY IN THIRD TRIMESTER: ICD-10-CM

## 2023-08-01 DIAGNOSIS — R21 RASH: Primary | ICD-10-CM

## 2023-08-01 LAB
ALBUMIN UR-MCNC: 30 MG/DL
GLUCOSE UR STRIP-MCNC: NEGATIVE MG/DL
KETONES UR STRIP-MCNC: 40 MG/DL

## 2023-08-01 PROCEDURE — 99207 PR NO CHARGE LOS: CPT | Performed by: STUDENT IN AN ORGANIZED HEALTH CARE EDUCATION/TRAINING PROGRAM

## 2023-08-01 PROCEDURE — 76819 FETAL BIOPHYS PROFIL W/O NST: CPT

## 2023-08-01 PROCEDURE — 81003 URINALYSIS AUTO W/O SCOPE: CPT | Performed by: FAMILY MEDICINE

## 2023-08-01 PROCEDURE — 99207 PR COMPLICATED OB VISIT: CPT | Performed by: FAMILY MEDICINE

## 2023-08-01 PROCEDURE — 76818 FETAL BIOPHYS PROFILE W/NST: CPT | Mod: 26 | Performed by: STUDENT IN AN ORGANIZED HEALTH CARE EDUCATION/TRAINING PROGRAM

## 2023-08-01 RX ORDER — TRIAMCINOLONE ACETONIDE 1 MG/G
CREAM TOPICAL 2 TIMES DAILY
Qty: 30 G | Refills: 0 | Status: SHIPPED | OUTPATIENT
Start: 2023-08-01 | End: 2023-10-18

## 2023-08-01 NOTE — NURSING NOTE
NST Performed due to fetal tachycardia on ultrasound.  Dr. Ordonez reviewed efm tracing. See NST/BPP Doc Flowsheet tab.    Carol Munoz RN

## 2023-08-01 NOTE — PROGRESS NOTES
"Subjective:  27 year old  at 34w6d  No ctx, lof, bleeding, or dc.  No HA or vision changes.    Outpatient Medications Prior to Visit   Medication Sig Dispense Refill    aspirin (ASA) 81 MG EC tablet Take 1 tablet (81 mg) by mouth daily 90 tablet 2    methimazole (TAPAZOLE) 5 MG tablet Take 1.5 tablets (7.5 mg) by mouth daily 135 tablet 1    Prenatal Vit-Fe Fumarate-FA (PRENATAL MULTIVITAMIN W/IRON) 27-0.8 MG tablet Take 1 tablet by mouth daily 90 tablet 3    progesterone (PROMETRIUM) 200 MG capsule Place 1 capsule (200 mg) vaginally At Bedtime 90 capsule 1    propranolol ER (INDERAL LA) 160 MG 24 hr capsule Take 1 capsule (160 mg) by mouth daily 90 capsule 1     No facility-administered medications prior to visit.      History   Smoking Status    Former    Years: 1.00    Types: Cigarettes   Smokeless Tobacco    Former    Quit date: 10/14/2021      Objective:  /62 (BP Location: Right arm, Patient Position: Sitting, Cuff Size: Adult Regular)   Pulse 86   Temp 97.9  F (36.6  C) (Temporal)   Resp 20   Ht 1.454 m (4' 9.25\")   Wt 54 kg (119 lb)   LMP 11/15/2022 (Within Weeks)   SpO2 96%   BMI 25.53 kg/m    Total weight gain: 6.804 kg (15 lb)   GENERAL: alert, not distressed  CHEST: clear, no rales, rhonchi, or wheezes  CARDIAC: regular without murmur, gallop, or rub  ABDOMEN: gravid, soft, non tender, non distended    No results found for this or any previous visit (from the past 24 hour(s)).   Assessment and Plan:   Supervision of high risk pregnancy, antepartum  Measures small for dates, suspicious for growth restriction.  Growth ultrasound  had AC 36%ile and EFW 20%ile  Has ultrasound later today.  - Urinalysis, OB Screen    Hyperthyroidism, Graves  Will continue med and follow up with Endocrinology.  Increased methimazole at last endocrine visit.  Continue propranolol.  Continue ASA for prevention of preeclampsia.    Thyrotropin Receptor Antibody remains positive, so infant at risk of  " Leopoldo.  I discussed with ped endocrine, who provided recommendations for screening after  - methimazole (TAPAZOLE) 5 MG tablet; Take 1 tablet (5 mg) by mouth daily  Dispense: 90 tablet; Refill: 0     Chronic HTN  On propranolol, mostly for headache tachycardia presumably from Graves'.  Growth ultrasounds every month and recommend BPP's weekly starting at 32 weeks.  Delivery at 37 weeks, per MFM.     Shortened cervix with hx of  deliveries.  First ultrasound reported Cervical Length 2.5 cm.  Started vaginal progesterone  Follow up ultrasound 3.7 cm but has continued progesterone.  Pregnancies are closely spaced with last delivery 22  Will continue progesterone until 37 weeks    Rash  Looks eczematous. Itchy.   Antecubital fossae, posterior lower legs  - triamcinolone (KENALOG) 0.1 % external cream; Apply topically 2 times daily  Dispense: 30 g; Refill: 0      No follow-ups on file.   Future Appointments   Date Time Provider Department Center   2023  3:30 PM [facility]   2023  4:00 PM [facility]OTT [facility]   2023  8:40 AM Victorino White MD [facility]   8/15/2023  7:30 AM [facility] LAB [facility]   8/15/2023  8:00 AM Victorino White MD [facility]   2023  8:00 AM Victorino Wihte MD [facility]   2023  8:00 AM Victorino White MD [facility]   2023  8:00 AM Victorino White MD [facility]   2023  1:00 PM Victorino White MD [facility]   2023 11:00 AM [facility]   10/17/2023  8:00 AM Victorino White MD [facility]

## 2023-08-03 ENCOUNTER — MEDICAL CORRESPONDENCE (OUTPATIENT)
Dept: HEALTH INFORMATION MANAGEMENT | Facility: CLINIC | Age: 28
End: 2023-08-03
Payer: COMMERCIAL

## 2023-08-15 ENCOUNTER — PRENATAL OFFICE VISIT (OUTPATIENT)
Dept: FAMILY MEDICINE | Facility: CLINIC | Age: 28
End: 2023-08-15
Payer: COMMERCIAL

## 2023-08-15 ENCOUNTER — LAB (OUTPATIENT)
Dept: LAB | Facility: CLINIC | Age: 28
End: 2023-08-15
Payer: COMMERCIAL

## 2023-08-15 ENCOUNTER — HOSPITAL ENCOUNTER (INPATIENT)
Facility: HOSPITAL | Age: 28
LOS: 3 days | Discharge: HOME | End: 2023-08-18
Attending: FAMILY MEDICINE | Admitting: FAMILY MEDICINE
Payer: COMMERCIAL

## 2023-08-15 VITALS
BODY MASS INDEX: 26.75 KG/M2 | WEIGHT: 124 LBS | SYSTOLIC BLOOD PRESSURE: 149 MMHG | HEART RATE: 120 BPM | RESPIRATION RATE: 22 BRPM | DIASTOLIC BLOOD PRESSURE: 70 MMHG | TEMPERATURE: 98 F | HEIGHT: 57 IN | OXYGEN SATURATION: 97 %

## 2023-08-15 DIAGNOSIS — O13.9 GESTATIONAL HYPERTENSION, ANTEPARTUM: ICD-10-CM

## 2023-08-15 DIAGNOSIS — E05.90 HYPERTHYROIDISM AFFECTING PREGNANCY, ANTEPARTUM: Primary | ICD-10-CM

## 2023-08-15 DIAGNOSIS — E05.90 HYPERTHYROIDISM: ICD-10-CM

## 2023-08-15 DIAGNOSIS — O09.90 SUPERVISION OF HIGH RISK PREGNANCY, ANTEPARTUM: ICD-10-CM

## 2023-08-15 DIAGNOSIS — O99.280 HYPERTHYROIDISM AFFECTING PREGNANCY, ANTEPARTUM: Primary | ICD-10-CM

## 2023-08-15 DIAGNOSIS — O09.90 SUPERVISION OF HIGH RISK PREGNANCY, ANTEPARTUM: Primary | ICD-10-CM

## 2023-08-15 DIAGNOSIS — I10 CHRONIC HYPERTENSION: ICD-10-CM

## 2023-08-15 DIAGNOSIS — O36.8130 DECREASED FETAL MOVEMENTS IN THIRD TRIMESTER, SINGLE OR UNSPECIFIED FETUS: ICD-10-CM

## 2023-08-15 PROBLEM — Z36.89 ENCOUNTER FOR TRIAGE IN PREGNANT PATIENT: Status: ACTIVE | Noted: 2023-08-15

## 2023-08-15 LAB
ABO/RH(D): NORMAL
ALBUMIN MFR UR ELPH: <4 MG/DL
ALBUMIN SERPL BCG-MCNC: 3.2 G/DL (ref 3.5–5.2)
ALBUMIN UR-MCNC: 30 MG/DL
ALP SERPL-CCNC: 205 U/L (ref 35–104)
ALT SERPL W P-5'-P-CCNC: 9 U/L (ref 0–50)
ANION GAP SERPL CALCULATED.3IONS-SCNC: 10 MMOL/L (ref 7–15)
ANTIBODY SCREEN: NEGATIVE
AST SERPL W P-5'-P-CCNC: 26 U/L (ref 0–45)
BILIRUB SERPL-MCNC: 0.3 MG/DL
BUN SERPL-MCNC: 6.2 MG/DL (ref 6–20)
CALCIUM SERPL-MCNC: 9.1 MG/DL (ref 8.6–10)
CHLORIDE SERPL-SCNC: 108 MMOL/L (ref 98–107)
CREAT SERPL-MCNC: 0.4 MG/DL (ref 0.51–0.95)
CREAT UR-MCNC: 9.7 MG/DL
DEPRECATED HCO3 PLAS-SCNC: 23 MMOL/L (ref 22–29)
ERYTHROCYTE [DISTWIDTH] IN BLOOD BY AUTOMATED COUNT: 13.1 % (ref 10–15)
GFR SERPL CREATININE-BSD FRML MDRD: >90 ML/MIN/1.73M2
GLUCOSE SERPL-MCNC: 80 MG/DL (ref 70–99)
GLUCOSE UR STRIP-MCNC: NEGATIVE MG/DL
HCT VFR BLD AUTO: 38.1 % (ref 35–47)
HGB BLD-MCNC: 12.3 G/DL (ref 11.7–15.7)
KETONES UR STRIP-MCNC: NEGATIVE MG/DL
MCH RBC QN AUTO: 26.9 PG (ref 26.5–33)
MCHC RBC AUTO-ENTMCNC: 32.3 G/DL (ref 31.5–36.5)
MCV RBC AUTO: 83 FL (ref 78–100)
PLATELET # BLD AUTO: 171 10E3/UL (ref 150–450)
POTASSIUM SERPL-SCNC: 4.5 MMOL/L (ref 3.4–5.3)
PROT SERPL-MCNC: 6 G/DL (ref 6.4–8.3)
PROT/CREAT 24H UR: NORMAL MG/G{CREAT}
RBC # BLD AUTO: 4.58 10E6/UL (ref 3.8–5.2)
SODIUM SERPL-SCNC: 141 MMOL/L (ref 136–145)
SPECIMEN EXPIRATION DATE: NORMAL
T3 SERPL-MCNC: 275 NG/DL (ref 85–202)
T4 SERPL-MCNC: 18.1 UG/DL (ref 4.5–11.7)
TSH SERPL DL<=0.005 MIU/L-ACNC: <0.01 UIU/ML (ref 0.3–4.2)
WBC # BLD AUTO: 8.6 10E3/UL (ref 4–11)

## 2023-08-15 PROCEDURE — 86850 RBC ANTIBODY SCREEN: CPT | Performed by: FAMILY MEDICINE

## 2023-08-15 PROCEDURE — 84443 ASSAY THYROID STIM HORMONE: CPT

## 2023-08-15 PROCEDURE — 84436 ASSAY OF TOTAL THYROXINE: CPT

## 2023-08-15 PROCEDURE — 250N000011 HC RX IP 250 OP 636: Mod: JZ | Performed by: FAMILY MEDICINE

## 2023-08-15 PROCEDURE — 84156 ASSAY OF PROTEIN URINE: CPT | Performed by: FAMILY MEDICINE

## 2023-08-15 PROCEDURE — 99207 PR PRENATAL VISIT: CPT | Performed by: FAMILY MEDICINE

## 2023-08-15 PROCEDURE — 85027 COMPLETE CBC AUTOMATED: CPT | Performed by: FAMILY MEDICINE

## 2023-08-15 PROCEDURE — 36415 COLL VENOUS BLD VENIPUNCTURE: CPT

## 2023-08-15 PROCEDURE — 81003 URINALYSIS AUTO W/O SCOPE: CPT | Performed by: FAMILY MEDICINE

## 2023-08-15 PROCEDURE — 120N000001 HC R&B MED SURG/OB

## 2023-08-15 PROCEDURE — 250N000013 HC RX MED GY IP 250 OP 250 PS 637: Performed by: FAMILY MEDICINE

## 2023-08-15 PROCEDURE — 84480 ASSAY TRIIODOTHYRONINE (T3): CPT

## 2023-08-15 PROCEDURE — 36415 COLL VENOUS BLD VENIPUNCTURE: CPT | Performed by: FAMILY MEDICINE

## 2023-08-15 PROCEDURE — 80053 COMPREHEN METABOLIC PANEL: CPT | Performed by: FAMILY MEDICINE

## 2023-08-15 PROCEDURE — 86780 TREPONEMA PALLIDUM: CPT | Performed by: FAMILY MEDICINE

## 2023-08-15 PROCEDURE — 87653 STREP B DNA AMP PROBE: CPT | Performed by: FAMILY MEDICINE

## 2023-08-15 RX ORDER — ONDANSETRON 4 MG/1
4 TABLET, ORALLY DISINTEGRATING ORAL EVERY 6 HOURS PRN
Status: DISCONTINUED | OUTPATIENT
Start: 2023-08-15 | End: 2023-08-16 | Stop reason: HOSPADM

## 2023-08-15 RX ORDER — PROCHLORPERAZINE MALEATE 10 MG
10 TABLET ORAL EVERY 6 HOURS PRN
Status: DISCONTINUED | OUTPATIENT
Start: 2023-08-15 | End: 2023-08-16 | Stop reason: HOSPADM

## 2023-08-15 RX ORDER — MISOPROSTOL 200 UG/1
400 TABLET ORAL
Status: DISCONTINUED | OUTPATIENT
Start: 2023-08-15 | End: 2023-08-16 | Stop reason: HOSPADM

## 2023-08-15 RX ORDER — LIDOCAINE 40 MG/G
CREAM TOPICAL
Status: DISCONTINUED | OUTPATIENT
Start: 2023-08-15 | End: 2023-08-15 | Stop reason: HOSPADM

## 2023-08-15 RX ORDER — OXYTOCIN/0.9 % SODIUM CHLORIDE 30/500 ML
100-340 PLASTIC BAG, INJECTION (ML) INTRAVENOUS CONTINUOUS PRN
Status: DISCONTINUED | OUTPATIENT
Start: 2023-08-15 | End: 2023-08-18 | Stop reason: HOSPADM

## 2023-08-15 RX ORDER — PROPRANOLOL HYDROCHLORIDE 80 MG/1
160 CAPSULE, EXTENDED RELEASE ORAL DAILY
Status: DISCONTINUED | OUTPATIENT
Start: 2023-08-16 | End: 2023-08-18 | Stop reason: HOSPADM

## 2023-08-15 RX ORDER — PROPRANOLOL HYDROCHLORIDE 80 MG/1
160 CAPSULE, EXTENDED RELEASE ORAL DAILY
Status: DISCONTINUED | OUTPATIENT
Start: 2023-08-15 | End: 2023-08-15

## 2023-08-15 RX ORDER — LABETALOL HYDROCHLORIDE 5 MG/ML
20-80 INJECTION, SOLUTION INTRAVENOUS EVERY 10 MIN PRN
Status: DISCONTINUED | OUTPATIENT
Start: 2023-08-15 | End: 2023-08-18 | Stop reason: HOSPADM

## 2023-08-15 RX ORDER — OXYTOCIN/0.9 % SODIUM CHLORIDE 30/500 ML
340 PLASTIC BAG, INJECTION (ML) INTRAVENOUS CONTINUOUS PRN
Status: DISCONTINUED | OUTPATIENT
Start: 2023-08-15 | End: 2023-08-16 | Stop reason: HOSPADM

## 2023-08-15 RX ORDER — MISOPROSTOL 200 UG/1
800 TABLET ORAL
Status: DISCONTINUED | OUTPATIENT
Start: 2023-08-15 | End: 2023-08-16 | Stop reason: HOSPADM

## 2023-08-15 RX ORDER — NALOXONE HYDROCHLORIDE 0.4 MG/ML
0.2 INJECTION, SOLUTION INTRAMUSCULAR; INTRAVENOUS; SUBCUTANEOUS
Status: DISCONTINUED | OUTPATIENT
Start: 2023-08-15 | End: 2023-08-16 | Stop reason: HOSPADM

## 2023-08-15 RX ORDER — METOCLOPRAMIDE 10 MG/1
10 TABLET ORAL EVERY 6 HOURS PRN
Status: DISCONTINUED | OUTPATIENT
Start: 2023-08-15 | End: 2023-08-16 | Stop reason: HOSPADM

## 2023-08-15 RX ORDER — IBUPROFEN 800 MG/1
800 TABLET, FILM COATED ORAL
Status: DISCONTINUED | OUTPATIENT
Start: 2023-08-15 | End: 2023-08-18 | Stop reason: HOSPADM

## 2023-08-15 RX ORDER — HYDRALAZINE HYDROCHLORIDE 20 MG/ML
10 INJECTION INTRAMUSCULAR; INTRAVENOUS
Status: DISCONTINUED | OUTPATIENT
Start: 2023-08-15 | End: 2023-08-18 | Stop reason: HOSPADM

## 2023-08-15 RX ORDER — NALOXONE HYDROCHLORIDE 0.4 MG/ML
0.4 INJECTION, SOLUTION INTRAMUSCULAR; INTRAVENOUS; SUBCUTANEOUS
Status: DISCONTINUED | OUTPATIENT
Start: 2023-08-15 | End: 2023-08-16 | Stop reason: HOSPADM

## 2023-08-15 RX ORDER — PENICILLIN G POTASSIUM 5000000 [IU]/1
5 INJECTION, POWDER, FOR SOLUTION INTRAMUSCULAR; INTRAVENOUS ONCE
Status: COMPLETED | OUTPATIENT
Start: 2023-08-15 | End: 2023-08-15

## 2023-08-15 RX ORDER — ONDANSETRON 2 MG/ML
4 INJECTION INTRAMUSCULAR; INTRAVENOUS EVERY 6 HOURS PRN
Status: DISCONTINUED | OUTPATIENT
Start: 2023-08-15 | End: 2023-08-16 | Stop reason: HOSPADM

## 2023-08-15 RX ORDER — KETOROLAC TROMETHAMINE 30 MG/ML
30 INJECTION, SOLUTION INTRAMUSCULAR; INTRAVENOUS
Status: DISCONTINUED | OUTPATIENT
Start: 2023-08-15 | End: 2023-08-18 | Stop reason: HOSPADM

## 2023-08-15 RX ORDER — CARBOPROST TROMETHAMINE 250 UG/ML
250 INJECTION, SOLUTION INTRAMUSCULAR
Status: DISCONTINUED | OUTPATIENT
Start: 2023-08-15 | End: 2023-08-16 | Stop reason: HOSPADM

## 2023-08-15 RX ORDER — OXYTOCIN 10 [USP'U]/ML
10 INJECTION, SOLUTION INTRAMUSCULAR; INTRAVENOUS
Status: DISCONTINUED | OUTPATIENT
Start: 2023-08-15 | End: 2023-08-16 | Stop reason: HOSPADM

## 2023-08-15 RX ORDER — PROCHLORPERAZINE 25 MG
25 SUPPOSITORY, RECTAL RECTAL EVERY 12 HOURS PRN
Status: DISCONTINUED | OUTPATIENT
Start: 2023-08-15 | End: 2023-08-16 | Stop reason: HOSPADM

## 2023-08-15 RX ORDER — MISOPROSTOL 100 UG/1
25 TABLET ORAL
Status: DISCONTINUED | OUTPATIENT
Start: 2023-08-15 | End: 2023-08-16

## 2023-08-15 RX ORDER — CITRIC ACID/SODIUM CITRATE 334-500MG
30 SOLUTION, ORAL ORAL
Status: DISCONTINUED | OUTPATIENT
Start: 2023-08-15 | End: 2023-08-16 | Stop reason: HOSPADM

## 2023-08-15 RX ORDER — METHYLERGONOVINE MALEATE 0.2 MG/ML
200 INJECTION INTRAVENOUS
Status: DISCONTINUED | OUTPATIENT
Start: 2023-08-15 | End: 2023-08-16 | Stop reason: HOSPADM

## 2023-08-15 RX ORDER — PENICILLIN G 3000000 [IU]/50ML
3 INJECTION, SOLUTION INTRAVENOUS EVERY 4 HOURS
Status: DISCONTINUED | OUTPATIENT
Start: 2023-08-15 | End: 2023-08-16 | Stop reason: HOSPADM

## 2023-08-15 RX ORDER — OXYTOCIN 10 [USP'U]/ML
10 INJECTION, SOLUTION INTRAMUSCULAR; INTRAVENOUS
Status: DISCONTINUED | OUTPATIENT
Start: 2023-08-15 | End: 2023-08-18 | Stop reason: HOSPADM

## 2023-08-15 RX ORDER — METOCLOPRAMIDE HYDROCHLORIDE 5 MG/ML
10 INJECTION INTRAMUSCULAR; INTRAVENOUS EVERY 6 HOURS PRN
Status: DISCONTINUED | OUTPATIENT
Start: 2023-08-15 | End: 2023-08-16 | Stop reason: HOSPADM

## 2023-08-15 RX ADMIN — Medication 25 MCG: at 17:18

## 2023-08-15 RX ADMIN — Medication 25 MCG: at 19:32

## 2023-08-15 RX ADMIN — PENICILLIN G POTASSIUM 5 MILLION UNITS: 5000000 POWDER, FOR SOLUTION INTRAMUSCULAR; INTRAPLEURAL; INTRATHECAL; INTRAVENOUS at 17:17

## 2023-08-15 RX ADMIN — PENICILLIN G 3 MILLION UNITS: 3000000 INJECTION, SOLUTION INTRAVENOUS at 22:00

## 2023-08-15 RX ADMIN — Medication 25 MCG: at 22:01

## 2023-08-15 ASSESSMENT — ACTIVITIES OF DAILY LIVING (ADL)
CHANGE_IN_FUNCTIONAL_STATUS_SINCE_ONSET_OF_CURRENT_ILLNESS/INJURY: NO
CONCENTRATING,_REMEMBERING_OR_MAKING_DECISIONS_DIFFICULTY: NO
ADLS_ACUITY_SCORE: 35
TOILETING_ISSUES: NO
ADLS_ACUITY_SCORE: 18
DRESSING/BATHING_DIFFICULTY: NO
DOING_ERRANDS_INDEPENDENTLY_DIFFICULTY: NO
WALKING_OR_CLIMBING_STAIRS_DIFFICULTY: NO
ADLS_ACUITY_SCORE: 35
FALL_HISTORY_WITHIN_LAST_SIX_MONTHS: NO
ADLS_ACUITY_SCORE: 35
DIFFICULTY_EATING/SWALLOWING: NO
WEAR_GLASSES_OR_BLIND: NO
ADLS_ACUITY_SCORE: 18
ADLS_ACUITY_SCORE: 18
ADLS_ACUITY_SCORE: 35

## 2023-08-15 NOTE — PROVIDER NOTIFICATION
08/15/23 1107 08/15/23 1125 08/15/23 1139   Vital Signs   BP (!) 142/79 (!) 144/87 (!) 145/88     MD notified of BP's. Placing HELLP lab orders.

## 2023-08-15 NOTE — PROGRESS NOTES
"Subjective:  27 year old  at 36w6d  No ctx, lof, bleeding, or dc.  No HA or vision changes.    Reported decreased fetal movement.    NST:  Baseline: 145 bmp  Variability: moderate  Has 15x 15 accelerations.  No decelerarions.  TOCO: contractions No     Had planned to have BPP today at [facility].  Plan for induction was at 37 weeks.    Outpatient Medications Prior to Visit   Medication Sig Dispense Refill    aspirin (ASA) 81 MG EC tablet Take 1 tablet (81 mg) by mouth daily 90 tablet 2    methimazole (TAPAZOLE) 5 MG tablet Take 1.5 tablets (7.5 mg) by mouth daily 135 tablet 1    Prenatal Vit-Fe Fumarate-FA (PRENATAL MULTIVITAMIN W/IRON) 27-0.8 MG tablet Take 1 tablet by mouth daily 90 tablet 3    progesterone (PROMETRIUM) 200 MG capsule Place 1 capsule (200 mg) vaginally At Bedtime 90 capsule 1    propranolol ER (INDERAL LA) 160 MG 24 hr capsule Take 1 capsule (160 mg) by mouth daily 90 capsule 1    triamcinolone (KENALOG) 0.1 % external cream Apply topically 2 times daily 30 g 0     No facility-administered medications prior to visit.      History   Smoking Status    Former    Years: 1.00    Types: Cigarettes   Smokeless Tobacco    Former    Quit date: 10/14/2021      Objective:  BP (!) 149/70 (BP Location: Right arm, Patient Position: Sitting, Cuff Size: Adult Regular)   Pulse 120   Temp 98  F (36.7  C) (Temporal)   Resp 22   Ht 1.454 m (4' 9.25\")   Wt 56.2 kg (124 lb)   LMP 11/15/2022 (Within Weeks)   SpO2 97%   BMI 26.60 kg/m    Total weight gain: 9.072 kg (20 lb)   GENERAL: alert, not distressed  CHEST: clear, no rales, rhonchi, or wheezes  CARDIAC: regular without murmur, gallop, or rub  ABDOMEN: gravid, soft, non tender, non distended    Recent Results (from the past 24 hour(s))   Urinalysis, OB Screen    Collection Time: 08/15/23  7:58 AM   Result Value Ref Range    Glucose Urine Negative Negative mg/dL    Ketones Urine Negative Negative mg/dL    Protein Albumin Urine 30 (A) Negative mg/dL    "   Assessment and Plan:   1. Supervision of high risk pregnancy, antepartum  - Group B strep PCR    2. Decreased fetal movements in third trimester, single or unspecified fetus  - US Fetal Biophys Prof w/o Non Stress Test; Future     BP elevated.  Has not taken propanolol yet today, though she often does not take this until later in the day.    Discussed with MFM.  Will cancel BPP today.  Have patient present to [facility] for evaluation. Likely to move toward delivery as we had already planned this for 37 weeks.    Charge nurse aware.  Patient needs to secure childcare for her others and then will present to [facility].

## 2023-08-15 NOTE — PROGRESS NOTES
Dr. White updated on pt's lab results. MD reviewed labs and BP's. MD is calling [facility] to consult about whether to start induction today vs tomorrow as scheduled.

## 2023-08-15 NOTE — PROGRESS NOTES
Pt is a  at 36 weeks 6 days gestation here reporting decreased fetal movement since this AM. Pt Placed on EFM and oriented to room. VS obtained and BP elevated, will recheck in 15 minutes. NST reactive and pt having ctx but denies feeling them. Pt denied LOF or abnormal discharge. Denies HA or visual disturbances. Dr. White notified of pt's arrival, reactive NST, ctx's, and BP's. MD to place orders for labs. Will continue EFM and await lab results for further plan of care.

## 2023-08-15 NOTE — H&P
Maternal Admission H&P  [facility]  Date of Admission: 8/15/2023  Date of Service: 8/15/2023    Name      Yusuf Stubbs         1995  MRN       [id number]  PCP        Victorino White at [facility], [phone].    ________________________________________________________________________    Assessment and Plan:  27 year old  at 36w6d.  Worsening gestational hypertension, so delivery indicated, per my discussion with MFM.  Will be 37 weeks at midnight, where we had previously planned to recommend IOL.  Cervix near favorable, but will start with ripening with misoprostol.  Oxytocin when favorable.    Hyperthyroidism  Continue methimazole intrapartum    Hypertension (chronic)  Continue propanolol     GBS unknown  Results of swab from today is pending.  PCN indicated at <37 weeks, so will start.    Infant care notes  Previously discussed post-delivery plan with pediatric endocrinology.     Cord blood or DOL 1: Check thyroid receptor antibody  DOL 3-5: check free T4, TSH, TrAb  DOL 10-14: check free T4, TSH, TrAb      Monitor for temperature stability, tachycardia, feeding concerns, poor weight gain, irritability, etc.     Contact pediatric endocrinology with any abnormals.  _______________________________________    Chief Complaint: decreased fetal movement.    HPI: Yusuf Stubbs is a 27 year old woman at 36w6d, based on an Estimated Date of Delivery: Sep 6, 2023. She presented to clinic for a regular follow up today.  She reported decreased fetal movement.    Hx of hyperthyroidism/Graves.  On methimazole to control thyroid.  Mid July labs still somewhat uncontrolled  Labs checked earlier today, not resulted yet.    Hypertension likely related to Graves  Had increased pressures for the first time in pregnancy, today.  On propanolol.  No severe range pressures.    Shortened cervix with hx of  deliveries.  First ultrasound reported Cervical  Length 2.5 cm.  Started vaginal progesterone  Follow up ultrasound 3.7 cm but has continued progesterone.  Pregnancies are closely spaced with last delivery 22  Discontinue now.    Patient Active Problem List    Diagnosis Date Noted    Supervision of high risk pregnancy, antepartum 2022     Priority: High    Graves disease 02/15/2017     Priority: High    Encounter for triage in pregnant patient 08/15/2023     Priority: Medium    Gestational hypertension 08/15/2023     Priority: Medium    Hyperthyroidism affecting pregnancy 2017     Priority: Medium    Hepatitis A vaccination not up to date 2016     Priority: Medium    Hyperthyroidism 2016     Priority: Medium    Susceptible to varicella (non-immune), currently pregnant 2016     Priority: Medium    Rubella non-immune status, antepartum 2016     Priority: Medium    Not immune to hepatitis B virus 2016     Priority: Low      OB History    Para Term  AB Living   5 4 1 3 0 4   SAB IAB Ectopic Multiple Live Births   0 0 0 0 4      # Outcome Date GA Lbr Senthil/2nd Weight Sex Delivery Anes PTL Lv   5 Current            4  22 35w0d  2.13 kg (4 lb 11.1 oz) M Vag-Spont None Y LUKE      Name: MATT,MALE-ALEXIS      Apgar1: 8  Apgar5: 9   3  17 36w2d 08:38 / 00:02 2.295 kg (5 lb 1 oz) M Vag-Spont None N LUKE      Name: Ha      Apgar1: 8  Apgar5: 9   2  16 35w0d 00:30 / 00:32 1.79 kg (3 lb 15.1 oz) M Vag-Spont None Y LUKE      Name: Marito      Apgar1: 8  Apgar5: 9   1 Term 10/10/13 40w0d  2.495 kg (5 lb 8 oz) F Vag-Spont   LUKE      Birth Comments: 2nd deg labial lac repaired      Name: Nuvia      Apgar1: 9  Apgar5: 9     Review of Systems Negative except what is noted in HPI.     Past Medical History:   Diagnosis Date    Endocrine disease     Hypertension     Hyperthyroidism 2016    Lab test positive for detection of COVID-19 virus 2021    Normal delivery 2022     Precipitous delivery, delivered (current hospitalization) 2016     delivery 2016    Unspecified fetal growth retardation, unspecified (weight) 2013    Urinary tract infection       Past Surgical History:   Procedure Laterality Date    NO PAST SURGERIES     Patient has no known allergies.  Family History   Problem Relation Age of Onset    No Known Problems Father     Thyroid Disease Mother     Hypertension Mother     Mental retardation Brother      Social History     Socioeconomic History    Marital status: Single     Spouse name: Not on file    Number of children: Not on file    Years of education: Not on file    Highest education level: Not on file   Occupational History    Not on file   Tobacco Use    Smoking status: Former     Years: 1.00     Types: Cigarettes     Passive exposure: Current ()    Smokeless tobacco: Former     Quit date: 10/14/2021   Substance and Sexual Activity    Alcohol use: No    Drug use: No    Sexual activity: Yes     Partners: Male     Birth control/protection: None   Other Topics Concern    Parent/sibling w/ CABG, MI or angioplasty before 65F 55M? No   Social History Narrative    Not on file     Social Determinants of Health     Financial Resource Strain: Not on file   Food Insecurity: Not on file   Transportation Needs: Not on file   Physical Activity: Not on file   Stress: Not on file   Social Connections: Not on file   Intimate Partner Violence: Not on file   Housing Stability: Not on file     Prior to Admission Medication List  Medications Prior to Admission   Medication Sig Dispense Refill Last Dose    methimazole (TAPAZOLE) 5 MG tablet Take 1.5 tablets (7.5 mg) by mouth daily 135 tablet 1 8/15/2023    Prenatal Vit-Fe Fumarate-FA (PRENATAL MULTIVITAMIN W/IRON) 27-0.8 MG tablet Take 1 tablet by mouth daily 90 tablet 3 8/15/2023    progesterone (PROMETRIUM) 200 MG capsule Place 1 capsule (200 mg) vaginally At Bedtime 90 capsule 1 2023    propranolol ER  "(INDERAL LA) 160 MG 24 hr capsule Take 1 capsule (160 mg) by mouth daily 90 capsule 1 8/15/2023    triamcinolone (KENALOG) 0.1 % external cream Apply topically 2 times daily 30 g 0 Past Week    aspirin (ASA) 81 MG EC tablet Take 1 tablet (81 mg) by mouth daily 90 tablet 2       Allergies  No Known Allergies  Immunization History   Administered Date(s) Administered    COVID-19 Monovalent 12+ (Pfizer 2022) 02/01/2022    FLU 6-35 months 10/09/2013    HepB, Unspecified 1995    Influenza Vaccine >6 months (Alfuria,Fluzone) 11/16/2021    Influenza Vaccine, 6+MO IM (QUADRIVALENT W/PRESERVATIVES) 11/21/2016    MMR 1995, 10/11/2013, 06/06/2016    TDAP (Adacel,Boostrix) 06/02/2016, 05/11/2022, 06/20/2023    TDAP Vaccine (Adacel) 10/11/2013    Td (Adult), Adsorbed 1994      Physical Exam  Patient Vitals for the past 24 hrs:   BP Temp Temp src Pulse Resp Height Weight   08/15/23 1211 (!) 141/97 -- -- -- -- -- --   08/15/23 1155 (!) 159/88 -- -- -- -- -- --   08/15/23 1139 (!) 145/88 -- -- -- -- -- --   08/15/23 1125 (!) 144/87 -- -- -- -- -- --   08/15/23 1107 (!) 142/79 98.4  F (36.9  C) Oral 50 16 1.448 m (4' 9\") 56.2 kg (124 lb)     Wt Readings from Last 1 Encounters:   08/15/23 56.2 kg (124 lb)     Prepregnancy: 47.2 kg (104 lb), BMI 22.50. Total gain: 9.072 kg (20 lb) (expected gain: 11.5 kg (25 lb)-16 kg (35 lb)).    HEART: RRR, no murmur  LUNGS: CTA bilaterally  Fetal Heart Tones: Baseline 140 bpm, variability moderate (6-25 bpm), no decelerations. Has accelerations.  CONTRACTIONS:  3-5 minutes, not feeling any of them  CERVIX: 1 cm, 50%, -2, mid, mod   FLUID: None noted.  Fetal Presentation: vertex.    Labs    Admission on 08/15/2023   Component Date Value Ref Range Status    WBC Count 08/15/2023 8.6  4.0 - 11.0 10e3/uL Final    RBC Count 08/15/2023 4.58  3.80 - 5.20 10e6/uL Final    Hemoglobin 08/15/2023 12.3  11.7 - 15.7 g/dL Final    Hematocrit 08/15/2023 38.1  35.0 - 47.0 % Final    MCV 08/15/2023 " 83  78 - 100 fL Final    MCH 08/15/2023 26.9  26.5 - 33.0 pg Final    MCHC 08/15/2023 32.3  31.5 - 36.5 g/dL Final    RDW 08/15/2023 13.1  10.0 - 15.0 % Final    Platelet Count 08/15/2023 171  150 - 450 10e3/uL Final    Sodium 08/15/2023 141  136 - 145 mmol/L Final    Potassium 08/15/2023 4.5  3.4 - 5.3 mmol/L Final    Chloride 08/15/2023 108 (H)  98 - 107 mmol/L Final    Carbon Dioxide (CO2) 08/15/2023 23  22 - 29 mmol/L Final    Anion Gap 08/15/2023 10  7 - 15 mmol/L Final    Urea Nitrogen 08/15/2023 6.2  6.0 - 20.0 mg/dL Final    Creatinine 08/15/2023 0.40 (L)  0.51 - 0.95 mg/dL Final    Calcium 08/15/2023 9.1  8.6 - 10.0 mg/dL Final    Glucose 08/15/2023 80  70 - 99 mg/dL Final    Alkaline Phosphatase 08/15/2023 205 (H)  35 - 104 U/L Final    AST 08/15/2023 26  0 - 45 U/L Final    Reference intervals for this test were updated on 6/12/2023 to more accurately reflect our healthy population. There may be differences in the flagging of prior results with similar values performed with this method. Interpretation of those prior results can be made in the context of the updated reference intervals.    ALT 08/15/2023 9  0 - 50 U/L Final    Reference intervals for this test were updated on 6/12/2023 to more accurately reflect our healthy population. There may be differences in the flagging of prior results with similar values performed with this method. Interpretation of those prior results can be made in the context of the updated reference intervals.      Protein Total 08/15/2023 6.0 (L)  6.4 - 8.3 g/dL Final    Albumin 08/15/2023 3.2 (L)  3.5 - 5.2 g/dL Final    Bilirubin Total 08/15/2023 0.3  <=1.2 mg/dL Final    GFR Estimate 08/15/2023 >90  >60 mL/min/1.73m2 Final    Total Protein Urine mg/dL 08/15/2023 <4.0    mg/dL Final    The reference ranges have not been established in urine protein. The results should be integrated into the clinical context for interpretation.    Total Protein UR MG/MG CR 08/15/2023    Final     Unable to calculate, urine creatinine or protein is outside the detectable limits.    Creatinine Urine mg/dL 08/15/2023 9.7  mg/dL Final    The reference ranges have not been established in urine creatinine. The results should be integrated into the clinical context for interpretation.   Prenatal Office Visit on 08/15/2023   Component Date Value Ref Range Status    Glucose Urine 08/15/2023 Negative  Negative mg/dL Final    Ketones Urine 08/15/2023 Negative  Negative mg/dL Final    Protein Albumin Urine 08/15/2023 30 (A)  Negative mg/dL Final    No results found for: GBPCRT       Antibody Screen   Date Value Ref Range Status   03/28/2023 Negative Negative Final     Hepatitis B Surface Antigen   Date Value Ref Range Status   03/28/2023 Nonreactive Nonreactive Final     Chlamydia trachomatis   Date Value Ref Range Status   03/28/2023 Negative Negative Final     Comment:     A negative result by transcription mediated amplification does not preclude the presence of C. trachomatis infection because results are dependent on proper and adequate collection, absence of inhibitors and sufficient rRNA to be detected.     Hemoglobin   Date Value Ref Range Status   08/15/2023 12.3 11.7 - 15.7 g/dL Final

## 2023-08-16 LAB
GP B STREP DNA SPEC QL NAA+PROBE: POSITIVE
HOLD SPECIMEN: NORMAL
T PALLIDUM AB SER QL: NONREACTIVE
TSH RECEP AB SER-ACNC: 15 IU/L (ref 0–1.75)

## 2023-08-16 PROCEDURE — 10907ZC DRAINAGE OF AMNIOTIC FLUID, THERAPEUTIC FROM PRODUCTS OF CONCEPTION, VIA NATURAL OR ARTIFICIAL OPENING: ICD-10-PCS | Performed by: FAMILY MEDICINE

## 2023-08-16 PROCEDURE — 59400 OBSTETRICAL CARE: CPT | Performed by: FAMILY MEDICINE

## 2023-08-16 PROCEDURE — 258N000003 HC RX IP 258 OP 636: Performed by: FAMILY MEDICINE

## 2023-08-16 PROCEDURE — 722N000001 HC LABOR CARE VAGINAL DELIVERY SINGLE

## 2023-08-16 PROCEDURE — 250N000009 HC RX 250: Performed by: FAMILY MEDICINE

## 2023-08-16 PROCEDURE — 83520 IMMUNOASSAY QUANT NOS NONAB: CPT | Performed by: FAMILY MEDICINE

## 2023-08-16 PROCEDURE — 250N000011 HC RX IP 250 OP 636: Mod: JZ | Performed by: FAMILY MEDICINE

## 2023-08-16 PROCEDURE — 250N000013 HC RX MED GY IP 250 OP 250 PS 637: Performed by: FAMILY MEDICINE

## 2023-08-16 PROCEDURE — 120N000001 HC R&B MED SURG/OB

## 2023-08-16 PROCEDURE — 3E033VJ INTRODUCTION OF OTHER HORMONE INTO PERIPHERAL VEIN, PERCUTANEOUS APPROACH: ICD-10-PCS | Performed by: FAMILY MEDICINE

## 2023-08-16 RX ORDER — LIDOCAINE 40 MG/G
CREAM TOPICAL
Status: DISCONTINUED | OUTPATIENT
Start: 2023-08-16 | End: 2023-08-16 | Stop reason: HOSPADM

## 2023-08-16 RX ORDER — ACETAMINOPHEN 325 MG/1
650 TABLET ORAL EVERY 4 HOURS PRN
Status: DISCONTINUED | OUTPATIENT
Start: 2023-08-16 | End: 2023-08-18 | Stop reason: HOSPADM

## 2023-08-16 RX ORDER — DOCUSATE SODIUM 100 MG/1
100 CAPSULE, LIQUID FILLED ORAL DAILY
Status: DISCONTINUED | OUTPATIENT
Start: 2023-08-16 | End: 2023-08-18 | Stop reason: HOSPADM

## 2023-08-16 RX ORDER — IBUPROFEN 800 MG/1
800 TABLET, FILM COATED ORAL EVERY 6 HOURS PRN
Status: DISCONTINUED | OUTPATIENT
Start: 2023-08-16 | End: 2023-08-18 | Stop reason: HOSPADM

## 2023-08-16 RX ORDER — OXYTOCIN/0.9 % SODIUM CHLORIDE 30/500 ML
340 PLASTIC BAG, INJECTION (ML) INTRAVENOUS CONTINUOUS PRN
Status: DISCONTINUED | OUTPATIENT
Start: 2023-08-16 | End: 2023-08-18 | Stop reason: HOSPADM

## 2023-08-16 RX ORDER — METHYLERGONOVINE MALEATE 0.2 MG/ML
200 INJECTION INTRAVENOUS
Status: DISCONTINUED | OUTPATIENT
Start: 2023-08-16 | End: 2023-08-18 | Stop reason: HOSPADM

## 2023-08-16 RX ORDER — MISOPROSTOL 200 UG/1
800 TABLET ORAL
Status: DISCONTINUED | OUTPATIENT
Start: 2023-08-16 | End: 2023-08-18 | Stop reason: HOSPADM

## 2023-08-16 RX ORDER — OXYTOCIN/0.9 % SODIUM CHLORIDE 30/500 ML
1-24 PLASTIC BAG, INJECTION (ML) INTRAVENOUS CONTINUOUS
Status: DISCONTINUED | OUTPATIENT
Start: 2023-08-16 | End: 2023-08-16 | Stop reason: HOSPADM

## 2023-08-16 RX ORDER — HYDROCORTISONE 25 MG/G
CREAM TOPICAL 3 TIMES DAILY PRN
Status: DISCONTINUED | OUTPATIENT
Start: 2023-08-16 | End: 2023-08-18 | Stop reason: HOSPADM

## 2023-08-16 RX ORDER — BISACODYL 10 MG
10 SUPPOSITORY, RECTAL RECTAL DAILY PRN
Status: DISCONTINUED | OUTPATIENT
Start: 2023-08-16 | End: 2023-08-18 | Stop reason: HOSPADM

## 2023-08-16 RX ORDER — CARBOPROST TROMETHAMINE 250 UG/ML
250 INJECTION, SOLUTION INTRAMUSCULAR
Status: DISCONTINUED | OUTPATIENT
Start: 2023-08-16 | End: 2023-08-18 | Stop reason: HOSPADM

## 2023-08-16 RX ORDER — MISOPROSTOL 200 UG/1
400 TABLET ORAL
Status: DISCONTINUED | OUTPATIENT
Start: 2023-08-16 | End: 2023-08-18 | Stop reason: HOSPADM

## 2023-08-16 RX ORDER — MODIFIED LANOLIN
OINTMENT (GRAM) TOPICAL
Status: DISCONTINUED | OUTPATIENT
Start: 2023-08-16 | End: 2023-08-18 | Stop reason: HOSPADM

## 2023-08-16 RX ORDER — SODIUM CHLORIDE, SODIUM LACTATE, POTASSIUM CHLORIDE, CALCIUM CHLORIDE 600; 310; 30; 20 MG/100ML; MG/100ML; MG/100ML; MG/100ML
INJECTION, SOLUTION INTRAVENOUS CONTINUOUS PRN
Status: DISCONTINUED | OUTPATIENT
Start: 2023-08-16 | End: 2023-08-16 | Stop reason: HOSPADM

## 2023-08-16 RX ORDER — OXYTOCIN 10 [USP'U]/ML
10 INJECTION, SOLUTION INTRAMUSCULAR; INTRAVENOUS
Status: DISCONTINUED | OUTPATIENT
Start: 2023-08-16 | End: 2023-08-18 | Stop reason: HOSPADM

## 2023-08-16 RX ADMIN — IBUPROFEN 800 MG: 800 TABLET ORAL at 17:28

## 2023-08-16 RX ADMIN — Medication 25 MCG: at 00:15

## 2023-08-16 RX ADMIN — PROPRANOLOL HYDROCHLORIDE 160 MG: 80 CAPSULE, EXTENDED RELEASE ORAL at 09:18

## 2023-08-16 RX ADMIN — ACETAMINOPHEN 650 MG: 325 TABLET ORAL at 17:28

## 2023-08-16 RX ADMIN — WITCH HAZEL: 500 SOLUTION RECTAL; TOPICAL at 17:29

## 2023-08-16 RX ADMIN — PENICILLIN G 3 MILLION UNITS: 3000000 INJECTION, SOLUTION INTRAVENOUS at 05:43

## 2023-08-16 RX ADMIN — SODIUM CHLORIDE, POTASSIUM CHLORIDE, SODIUM LACTATE AND CALCIUM CHLORIDE: 600; 310; 30; 20 INJECTION, SOLUTION INTRAVENOUS at 05:00

## 2023-08-16 RX ADMIN — METHIMAZOLE 7.5 MG: 5 TABLET ORAL at 09:19

## 2023-08-16 RX ADMIN — BENZOCAINE AND LEVOMENTHOL: 200; 5 SPRAY TOPICAL at 17:29

## 2023-08-16 RX ADMIN — ACETAMINOPHEN 650 MG: 325 TABLET ORAL at 09:17

## 2023-08-16 RX ADMIN — Medication 25 MCG: at 02:17

## 2023-08-16 RX ADMIN — Medication 2 MILLI-UNITS/MIN: at 04:56

## 2023-08-16 RX ADMIN — PENICILLIN G 3 MILLION UNITS: 3000000 INJECTION, SOLUTION INTRAVENOUS at 01:30

## 2023-08-16 RX ADMIN — KETOROLAC TROMETHAMINE 30 MG: 30 INJECTION, SOLUTION INTRAMUSCULAR; INTRAVENOUS at 06:47

## 2023-08-16 RX ADMIN — DOCUSATE SODIUM 100 MG: 100 CAPSULE, LIQUID FILLED ORAL at 09:18

## 2023-08-16 ASSESSMENT — ACTIVITIES OF DAILY LIVING (ADL)
ADLS_ACUITY_SCORE: 18

## 2023-08-16 NOTE — PLAN OF CARE
Problem: Postpartum (Vaginal Delivery)  Goal: Optimal Pain Control and Function  Outcome: Progressing  Intervention: Prevent or Manage Pain    Problem: Postpartum (Vaginal Delivery)  Goal: Successful Maternal Role Transition  Outcome: Progressing     Problem: Postpartum (Vaginal Delivery)  Goal: Hemostasis  Outcome: Progressing     Problem: Postpartum (Vaginal Delivery)  Goal: Absence of Infection Signs and Symptoms  Outcome: Progressing     Progressing well. Vital signs stable. Reflexes normal. Denies headache, vision changes, or right upper quadrant pain. Voiding frequently and passing gas. Ambulates independently to the bathroom. Denies lightheadedness or dizziness. Reports intermittent cramping pain. Managed with Ibuprofen and (See MAR). Utilizing trinity bottle, Tucks, and Dermoplast as needed.   Formula feeding baby every 2-3 hours. Attentive to  cues.

## 2023-08-16 NOTE — PROGRESS NOTES
Updated Dr White regarding patients blood pressures. Patient denies any headache, RUQ pain, and visual disturbances.

## 2023-08-16 NOTE — L&D DELIVERY NOTE
VAGINAL DELIVERY NOTE    PRE DELIVERY DIAGNOSIS  1) Intrauterine pregnancy at 37w0d    2) chronic HTN with increasing Bps  3) Graves hyperthyroidism on methimazole    POST DELIVERY DIAGNOSIS  1) Intrauterine pregnancy at 37w0d Gestational age  2) Delivery of a viable male infant  3) Apgar scores: 9 , 9      4) [place] Birth Weight: 2.17 kg (4 lb 12.5 oz)     Labor Complications: None   Additional Complications:  none    Delivery Type: Vaginal, Spontaneous     PROBLEM LIST:  Patient Active Problem List   Diagnosis    Hepatitis A vaccination not up to date    Hyperthyroidism    Not immune to hepatitis B virus    Susceptible to varicella (non-immune), currently pregnant    Hyperthyroidism affecting pregnancy    Graves disease    Rubella non-immune status, antepartum    Supervision of high risk pregnancy, antepartum    Encounter for triage in pregnant patient    Gestational hypertension       Augmentation Yes                 Induction Yes                      Indication: CHTN with increasing Bps, hyperthyroidism    Monitors: External     Lab Results   Component Value Date    AS Negative 08/15/2023    HEPBANG Nonreactive 2023    CHPCRT Negative 2023    HGB 12.3 08/15/2023     GBS: unknown. She received 3 intrapartum doses of IV penicillin..    ROM: AROM  Fluid Type: Clear    Labor Analgesia/Anesthesia:unmedicated    Cord pH obtained: No  Placenta submitted to Pathology: No    Description of procedure:   27 year old  with PNC w/ Dr. White and pregnancy complicated by CHTN and hyperthyroidism on methimazole  presented to L&D with decreased fetal movement.  Her hospital course was uncomplicated.  Patient progressed to complete with artificial rupture of membranes   Shoulder Dystocia No  Operative Vaginal Delivery No  Infant spontaneously delivered over an intact perineum.   No repair was needed  Infant delivered in AGUSTÍN position.  Nuchal cord No      Placenta spontaneously delivered: 2023   6:39 AM  grossly intact with 3 vessel cord.  Infant:  Live, vigorous infant  was handed to mom.    Delivery was uncomplicated. Interventions included fundal massage and pitocin.    Delivery QBL (mL): 150    Mother and Infant stable.    Victorino White MD    8/16/2023 6:56 AM

## 2023-08-17 PROCEDURE — 250N000011 HC RX IP 250 OP 636: Performed by: FAMILY MEDICINE

## 2023-08-17 PROCEDURE — 90707 MMR VACCINE SC: CPT | Performed by: FAMILY MEDICINE

## 2023-08-17 PROCEDURE — 120N000001 HC R&B MED SURG/OB

## 2023-08-17 PROCEDURE — 250N000013 HC RX MED GY IP 250 OP 250 PS 637: Performed by: FAMILY MEDICINE

## 2023-08-17 PROCEDURE — 99207 PR SUBSEQUENT HOSPITAL CARE FOR MOTHER, 15 MINUTES: CPT | Performed by: FAMILY MEDICINE

## 2023-08-17 PROCEDURE — 90471 IMMUNIZATION ADMIN: CPT | Performed by: FAMILY MEDICINE

## 2023-08-17 RX ADMIN — DOCUSATE SODIUM 100 MG: 100 CAPSULE, LIQUID FILLED ORAL at 08:55

## 2023-08-17 RX ADMIN — METHIMAZOLE 7.5 MG: 5 TABLET ORAL at 08:59

## 2023-08-17 RX ADMIN — IBUPROFEN 800 MG: 800 TABLET ORAL at 21:46

## 2023-08-17 RX ADMIN — ACETAMINOPHEN 650 MG: 325 TABLET ORAL at 08:55

## 2023-08-17 RX ADMIN — ACETAMINOPHEN 650 MG: 325 TABLET ORAL at 00:54

## 2023-08-17 RX ADMIN — PROPRANOLOL HYDROCHLORIDE 160 MG: 80 CAPSULE, EXTENDED RELEASE ORAL at 08:57

## 2023-08-17 RX ADMIN — MEASLES, MUMPS, AND RUBELLA VIRUS VACCINE LIVE 0.5 ML: 1000; 12500; 1000 INJECTION, POWDER, LYOPHILIZED, FOR SUSPENSION SUBCUTANEOUS at 18:51

## 2023-08-17 RX ADMIN — IBUPROFEN 800 MG: 800 TABLET ORAL at 08:55

## 2023-08-17 RX ADMIN — IBUPROFEN 800 MG: 800 TABLET ORAL at 00:54

## 2023-08-17 RX ADMIN — ACETAMINOPHEN 650 MG: 325 TABLET ORAL at 21:46

## 2023-08-17 ASSESSMENT — ACTIVITIES OF DAILY LIVING (ADL)
ADLS_ACUITY_SCORE: 18

## 2023-08-17 NOTE — PROGRESS NOTES
Dr. Garcia was notified of patient's 2 low HR readings today: 43 at 1300 and 46 at 1741.  Patient is asymptomatic; O2 sats 97-99.  Dr. Garcia stated that rounder tomorrow may wish to change Propranolol dose. She's OK with HR now.

## 2023-08-17 NOTE — PROVIDER NOTIFICATION
Patients heart rate 43. Patient sleeping, rousable. Message left for Dr. Boone to call Lacy MOORE RN.

## 2023-08-17 NOTE — PROGRESS NOTES
[facility]    Post-Partum Progress Note    Assessment & Plan   Assessment:  Post-partum day #1  Normal spontaneous vaginal delivery  Patient Active Problem List   Diagnosis    Hepatitis A vaccination not up to date    Hyperthyroidism    Not immune to hepatitis B virus    Susceptible to varicella (non-immune), currently pregnant    Hyperthyroidism affecting pregnancy    Graves disease    Rubella non-immune status, antepartum    Supervision of high risk pregnancy, antepartum    Gestational hypertension     Plan:  Doing well.  No excessive bleeding  Pain well-controlled.  Ambulation encouraged  Anticipate discharge tomorrow  Continue methimazole, labs reviewed.     Tanisha Boone MD     Interval History   Doing well.  Pain is adequately controlled.  No fevers.  No history of foul-smelling vaginal discharge.  Good appetite.  Denies chest pain, shortness of breath, nausea or vomiting.  Vaginal bleeding is similar to a heavy menstrual flow.  Bottle feeding.     Medications    - MEDICATION INSTRUCTIONS -      oxytocin in 0.9% NaCl      oxytocin in 0.9% NaCl 340 mL/hr (08/16/23 0647)      docusate sodium  100 mg Oral Daily    Measles, Mumps & Rubella Vac  0.5 mL Subcutaneous Once    methimazole  7.5 mg Oral Daily    propranolol ER  160 mg Oral Daily       Physical Exam   Temp: 98.2  F (36.8  C) Temp src: Oral BP: 127/87 Pulse: 73   Resp: 18 SpO2: 99 % O2 Device: None (Room air)    Vitals:    08/15/23 1107 08/17/23 0600   Weight: 56.2 kg (124 lb) 54 kg (119 lb 1.6 oz)     Vital Signs with Ranges  Temp:  [97.5  F (36.4  C)-98.4  F (36.9  C)] 98.2  F (36.8  C)  Pulse:  [69-84] 73  Resp:  [16-28] 18  BP: (114-129)/(59-87) 127/87  SpO2:  [96 %-99 %] 99 %  I/O last 3 completed shifts:  In: 1434 [I.V.:1434]  Out: 1476 [Urine:1200; Blood:276]    Uterine fundus is firm, non-tender and at the level of the umbilicus    Data   Recent Labs   Lab Test 08/15/23  1716   AS Negative     Recent Labs   Lab Test  08/15/23  1214 06/20/23  0857   HGB 12.3 11.6*     Recent Labs   Lab Test 03/28/23  1212   RUQIGG No detectable antibody.

## 2023-08-17 NOTE — DISCHARGE INSTRUCTIONS
Warning Signs after Having a Baby    Keep this paper on your fridge or somewhere else where you can see it.    Call your provider if you have any of these symptoms up to 12 weeks after having your baby.    Thoughts of hurting yourself or your baby  Pain in your chest or trouble breathing  Severe headache not helped by pain medicine  Eyesight concerns (blurry vision, seeing spots or flashes of light, other changes to eyesight)  Fainting, shaking or other signs of a seizure    Call 9-1-1 if you feel that it is an emergency.     The symptoms below can happen to anyone after giving birth. They can be very serious. Call your provider if you have any of these warning signs.    My provider s phone number: _______________________    Losing too much blood (hemorrhage)    Call your provider if you soak through a pad in less than an hour or pass blood clots bigger than a golf ball. These may be signs that you are bleeding too much.    Blood clots in the legs or lungs    After you give birth, your body naturally clots its blood to help prevent blood loss. Sometimes this increased clotting can happen in other areas of the body, like the legs or lungs. This can block your blood flow and be very dangerous.     Call your provider if you:  Have a red, swollen spot on the back of your leg that is warm or painful when you touch it.   Are coughing up blood.     Infection    Call your provider if you have any of these symptoms:  Fever of 100.4 F (38 C) or higher.  Pain or redness around your stitches if you had an incision.   Any yellow, white, or green fluid coming from places where you had stitches or surgery.    Mood Problems (postpartum depression)    Many people feel sad or have mood changes after having a baby. But for some people, these mood swings are worse.     Call your provider right away if you feel so anxious or nervous that you can't care for yourself or your baby.    Preeclampsia (high blood pressure)    Even if you  didn't have high blood pressure when you were pregnant, you are at risk for the high blood pressure disease called preeclampsia. This risk can last up to 12 weeks after giving birth.     Call your provider if you have:   Pain on your right side under your rib cage  Sudden swelling in the hands and face    Remember: You know your body. If something doesn't feel right, get medical help.     For informational purposes only. Not to replace the advice of your health care provider. Copyright 2020 [facility]. All rights reserved. Clinically reviewed by Shereen Feltcher, RNC-OB, MSN. [place] [id number] - Rev 02/23.

## 2023-08-17 NOTE — PLAN OF CARE
Problem: Plan of Care - These are the overarching goals to be used throughout the patient stay.    Goal: Optimal Comfort and Wellbeing  Intervention: Monitor Pain and Promote Comfort  Recent Flowsheet Documentation  Taken 8/17/2023 0054 by Irma Daniel RN  Pain Management Interventions: medication (see MAR)  Intervention: Provide Person-Centered Care  Recent Flowsheet Documentation  Taken 8/17/2023 0400 by Irma Daniel RN  Trust Relationship/Rapport:   care explained   choices provided   emotional support provided   empathic listening provided   questions encouraged   reassurance provided   thoughts/feelings acknowledged   questions answered  Taken 8/17/2023 0046 by Irma Daniel RN  Trust Relationship/Rapport:   care explained   choices provided   emotional support provided   empathic listening provided   questions encouraged   reassurance provided   thoughts/feelings acknowledged   questions answered  Taken 8/16/2023 2100 by Irma Daniel RN  Trust Relationship/Rapport:   care explained   choices provided   emotional support provided   empathic listening provided   questions encouraged   reassurance provided   thoughts/feelings acknowledged   questions answered   Goal Outcome Evaluation:         VSS, denies preeclampsia symptoms.Voiding adequately. Up independently. Attentive to baby's cues, no help overnight. Asked nurse to take baby for a few hours so she could get some rest.

## 2023-08-17 NOTE — PLAN OF CARE
Patient is managing pain with Ibuprofen and Tylenol. Postpartum assessment WNL. Patient is hopeful for discharge to home today. Still needs to complete birth certificate and ROP.   Problem: Postpartum (Vaginal Delivery)  Goal: Successful Maternal Role Transition  Outcome: Progressing

## 2023-08-18 VITALS
DIASTOLIC BLOOD PRESSURE: 72 MMHG | WEIGHT: 118 LBS | HEART RATE: 88 BPM | BODY MASS INDEX: 25.46 KG/M2 | HEIGHT: 57 IN | OXYGEN SATURATION: 97 % | SYSTOLIC BLOOD PRESSURE: 146 MMHG | RESPIRATION RATE: 18 BRPM | TEMPERATURE: 98.6 F

## 2023-08-18 PROCEDURE — 99207 PR SUBSEQUENT HOSPITAL CARE FOR MOTHER, 15 MINUTES: CPT | Performed by: FAMILY MEDICINE

## 2023-08-18 PROCEDURE — 250N000013 HC RX MED GY IP 250 OP 250 PS 637: Performed by: FAMILY MEDICINE

## 2023-08-18 RX ORDER — DOCUSATE SODIUM 100 MG/1
100 CAPSULE, LIQUID FILLED ORAL DAILY
Qty: 30 CAPSULE | Refills: 0 | Status: SHIPPED | OUTPATIENT
Start: 2023-08-19 | End: 2023-10-18

## 2023-08-18 RX ORDER — IBUPROFEN 800 MG/1
800 TABLET, FILM COATED ORAL EVERY 6 HOURS PRN
Qty: 30 TABLET | Refills: 0 | Status: SHIPPED | OUTPATIENT
Start: 2023-08-18

## 2023-08-18 RX ADMIN — PROPRANOLOL HYDROCHLORIDE 160 MG: 80 CAPSULE, EXTENDED RELEASE ORAL at 08:52

## 2023-08-18 RX ADMIN — IBUPROFEN 800 MG: 800 TABLET ORAL at 18:32

## 2023-08-18 RX ADMIN — ACETAMINOPHEN 650 MG: 325 TABLET ORAL at 18:32

## 2023-08-18 RX ADMIN — DOCUSATE SODIUM 100 MG: 100 CAPSULE, LIQUID FILLED ORAL at 08:53

## 2023-08-18 RX ADMIN — METHIMAZOLE 7.5 MG: 5 TABLET ORAL at 08:52

## 2023-08-18 ASSESSMENT — ACTIVITIES OF DAILY LIVING (ADL)
ADLS_ACUITY_SCORE: 18

## 2023-08-18 NOTE — PROGRESS NOTES
[facility]irthplace RN Care Coordinator Note    Yusuf Stubbs  [id number]  1995    Chart reviewed, discharge follow-up plan discussed with attending MD and patient's bedside RN, needs assessed. BP check planned at baby's appointment on Monday, 23. Post-delivery follow-up appointments with  planned in 2 & 6 weeks at [facility]. Home care nurse visit not ordered by discharging provider.    Patient is reported to have support at home and would like to discharge today with . RN Care Coordinator will continue to follow and assist with discharge planning as needed.

## 2023-08-18 NOTE — DISCHARGE SUMMARY
"[facility]    Discharge Summary  Obstetrics    Date of Admission:  8/15/2023  Date of Discharge:  8/18/2023  Discharging Provider: Tanisha Boone MD    Discharge Diagnoses   .  Patient Active Problem List   Diagnosis    Hepatitis A vaccination not up to date    Hyperthyroidism    Not immune to hepatitis B virus    Susceptible to varicella (non-immune), currently pregnant    Hyperthyroidism affecting pregnancy    Graves disease    Rubella non-immune status, antepartum    Supervision of high risk pregnancy, antepartum    Chronic hypertension         History of Present Illness   Yusuf Stubbs is a 27 year old female who presented with induction of labor due to hypertension.     Hospital Course   The patient's hospital course was unremarkable.  She recovered as anticipated and experienced no post-delivery complications.  On discharge, her pain was well controlled. Vaginal bleeding is similar to peak menstrual flow.  Voiding without difficulty.  Ambulating well and tolerating a normal diet.  No fevers.  Bottle feeding well.  Infant is stable.  Her blood pressure was mildly elevated on morning of discharge, it was noted that the automatic cuff was not working well, a manual cuff was then used and blood pressure was improved. She will monitor her BP closely. Advised to seek BP recheck at infant appointment on 8/22/23. She was discharged on post-partum day #2.    PE  Vital signs:  Temp: 98.8  F (37.1  C) Temp src: Oral BP: (!) 142/82 Pulse: 108   Resp: 16 SpO2: 98 % O2 Device: None (Room air)   Height: 144.8 cm (4' 9\") Weight: 53.5 kg (118 lb)  Estimated body mass index is 25.53 kg/m  as calculated from the following:    Height as of this encounter: 1.448 m (4' 9\").    Weight as of this encounter: 53.5 kg (118 lb).    Gen: sitting comfortably  Abd: fundus firm below umbilicus  Ext: no calf edema or tenderness  Skin: warm, dry        Post-partum hemoglobin:   Hemoglobin   Date Value Ref Range Status "   08/15/2023 12.3 11.7 - 15.7 g/dL Final       [place] Depression Scale  Thoughts of Harming Self: Never  Total Score: 0      Tanisha Boone MD    Discharge Disposition   Discharged to home   Condition at discharge: Stable    Primary Care Physician   Victorino White    Consultations This Hospital Stay   LACTATION IP CONSULT    Discharge Orders      Activity    Activity as tolerated     Reason for your hospital stay    Maternity care     Follow Up    Follow up with provider in 2 weeks and 6 weeks for post-delivery checks     Diet    Resume previous diet     Discharge Medications   Current Discharge Medication List        START taking these medications    Details   docusate sodium (COLACE) 100 MG capsule Take 1 capsule (100 mg) by mouth daily  Qty: 30 capsule, Refills: 0    Associated Diagnoses: Hyperthyroidism; Supervision of high risk pregnancy, antepartum; Gestational hypertension, antepartum; Chronic hypertension      ibuprofen (ADVIL/MOTRIN) 800 MG tablet Take 1 tablet (800 mg) by mouth every 6 hours as needed for other (cramping)  Qty: 30 tablet, Refills: 0    Associated Diagnoses: Hyperthyroidism; Supervision of high risk pregnancy, antepartum; Gestational hypertension, antepartum; Chronic hypertension           CONTINUE these medications which have NOT CHANGED    Details   methimazole (TAPAZOLE) 5 MG tablet Take 1.5 tablets (7.5 mg) by mouth daily  Qty: 135 tablet, Refills: 1    Associated Diagnoses: Hyperthyroidism      propranolol ER (INDERAL LA) 160 MG 24 hr capsule Take 1 capsule (160 mg) by mouth daily  Qty: 90 capsule, Refills: 1    Associated Diagnoses: Hyperthyroidism; Graves disease      triamcinolone (KENALOG) 0.1 % external cream Apply topically 2 times daily  Qty: 30 g, Refills: 0    Associated Diagnoses: Rash           STOP taking these medications       aspirin (ASA) 81 MG EC tablet Comments:   Reason for Stopping:         Prenatal Vit-Fe Fumarate-FA (PRENATAL MULTIVITAMIN W/IRON) 27-0.8 MG tablet  Comments:   Reason for Stopping:         progesterone (PROMETRIUM) 200 MG capsule Comments:   Reason for Stopping:             Allergies   No Known Allergies

## 2023-08-18 NOTE — PROVIDER NOTIFICATION
08/18/23 0645   Provider Notification   Provider Name/Title Dr. Boone   Method of Notification Phone   Request Evaluate-Remote   Notification Reason Vital Signs Change       Notified Dr. Boone that patient has had mildly elevated BPs overnight 130s-140s/70s-90s. Patient denies HA, epigastric pain, and vision changes. +2 DTR, no clonus. Dr. Boone ok with continuing to monitor BPs at this time. Continue with plan of care.

## 2023-08-18 NOTE — PLAN OF CARE
VSS except  BPs 130s-140s/80s-90s overnight, +2 DTR, no clonus, denies HA, epigastric pain, and vision changes. HR irregularly regular with HR 70-80 overnight. Bottle feeding formula per parental request, infant tolerating well. Scant amount of lochia, no clots noted. Tylenol and Ibuprofen for pain control. BS audible/active x4, tolerating PO,  denies N/V. Voiding. Bonding well with infant, attentive to cares.  Significant other at home overnight, supportive when here. Continue with plan of care.

## 2023-08-18 NOTE — PLAN OF CARE
"Goal Outcome Evaluation: Progressing      Plan of Care Reviewed With: patient    Overall Patient Progress: improvingOverall Patient Progress: improving       Yusuf's VSS. Her last BP was high normal of 139/84 at 2146. Her HR this shift has been low, she is asymptomatic with these low HR's; provider notified, see detailed note. She is bottle feeding baby with formula. Yusuf received her MMR vaccine this shift. Her significant other and older children were visiting earlier; they will be at home for the rest of the evening. Yusuf is performing self-cares and cares for baby independently, lovingly, and ably.       /84 (BP Location: Left arm, Patient Position: Sitting, Cuff Size: Adult Regular)   Pulse (!) 44   Temp 98.1  F (36.7  C) (Oral)   Resp 20   Ht 1.448 m (4' 9\")   Wt 54 kg (119 lb 1.6 oz)   LMP 11/15/2022 (Within Weeks)   SpO2 98%   Breastfeeding Unknown   BMI 25.77 kg/m      STEPHON BARBER RN on 8/17/2023 at 10:47 PM        Problem: Plan of Care - These are the overarching goals to be used throughout the patient stay.    Goal: Plan of Care Review  Description: The Plan of Care Review/Shift note should be completed every shift.  The Outcome Evaluation is a brief statement about your assessment that the patient is improving, declining, or no change.  This information will be displayed automatically on your shift note.  Outcome: Progressing  Flowsheets (Taken 8/17/2023 2237)  Plan of Care Reviewed With: patient  Overall Patient Progress: improving     Problem: Postpartum (Vaginal Delivery)  Goal: Hemostasis  8/17/2023 2237 by Stephon Barber, RN  Outcome: Progressing  8/17/2023 2236 by Stephon Barber, RN  Outcome: Progressing           "

## 2023-08-19 NOTE — PLAN OF CARE
Problem: Postpartum (Vaginal Delivery)  Goal: Successful Maternal Role Transition  Outcome: Met   Goal Outcome Evaluation:  Yusuf's OB checks remained stable/WNL. VS stable per pt's parameters, she will continue with prescribed BP meds. Providing baby cares independently. She has verbalized understanding of discharge instructions and follow up appointments required.

## 2023-08-22 ENCOUNTER — MEDICAL CORRESPONDENCE (OUTPATIENT)
Dept: HEALTH INFORMATION MANAGEMENT | Facility: CLINIC | Age: 28
End: 2023-08-22

## 2023-08-22 DIAGNOSIS — E05.90 HYPERTHYROIDISM: Primary | ICD-10-CM

## 2023-08-27 ENCOUNTER — HEALTH MAINTENANCE LETTER (OUTPATIENT)
Age: 28
End: 2023-08-27

## 2023-09-19 ENCOUNTER — LAB (OUTPATIENT)
Dept: LAB | Facility: CLINIC | Age: 28
End: 2023-09-19
Payer: COMMERCIAL

## 2023-09-19 DIAGNOSIS — E05.90 HYPERTHYROIDISM: ICD-10-CM

## 2023-09-19 LAB
ALBUMIN SERPL BCG-MCNC: 3.8 G/DL (ref 3.5–5.2)
ALP SERPL-CCNC: 170 U/L (ref 35–104)
ALT SERPL W P-5'-P-CCNC: 19 U/L (ref 0–50)
AST SERPL W P-5'-P-CCNC: 32 U/L (ref 0–45)
BILIRUB DIRECT SERPL-MCNC: <0.2 MG/DL (ref 0–0.3)
BILIRUB SERPL-MCNC: 0.2 MG/DL
PROT SERPL-MCNC: 6.8 G/DL (ref 6.4–8.3)
T3 SERPL-MCNC: 141 NG/DL (ref 85–202)
T4 FREE SERPL-MCNC: 2.87 NG/DL (ref 0.9–1.7)
TSH SERPL DL<=0.005 MIU/L-ACNC: <0.01 UIU/ML (ref 0.3–4.2)

## 2023-09-19 PROCEDURE — 84439 ASSAY OF FREE THYROXINE: CPT

## 2023-09-19 PROCEDURE — 80076 HEPATIC FUNCTION PANEL: CPT

## 2023-09-19 PROCEDURE — 84480 ASSAY TRIIODOTHYRONINE (T3): CPT

## 2023-09-19 PROCEDURE — 36415 COLL VENOUS BLD VENIPUNCTURE: CPT

## 2023-09-19 PROCEDURE — 84443 ASSAY THYROID STIM HORMONE: CPT

## 2023-09-29 ENCOUNTER — OFFICE VISIT (OUTPATIENT)
Dept: ENDOCRINOLOGY | Facility: CLINIC | Age: 28
End: 2023-09-29
Payer: COMMERCIAL

## 2023-09-29 VITALS
DIASTOLIC BLOOD PRESSURE: 82 MMHG | SYSTOLIC BLOOD PRESSURE: 140 MMHG | WEIGHT: 105 LBS | HEART RATE: 96 BPM | BODY MASS INDEX: 22.72 KG/M2

## 2023-09-29 DIAGNOSIS — E05.00 GRAVES DISEASE: ICD-10-CM

## 2023-09-29 DIAGNOSIS — E05.90 HYPERTHYROIDISM: Primary | ICD-10-CM

## 2023-09-29 PROCEDURE — 99214 OFFICE O/P EST MOD 30 MIN: CPT | Performed by: INTERNAL MEDICINE

## 2023-09-29 RX ORDER — METHIMAZOLE 5 MG/1
10 TABLET ORAL DAILY
Qty: 60 TABLET | Refills: 4 | Status: SHIPPED | OUTPATIENT
Start: 2023-09-29 | End: 2024-02-10 | Stop reason: DRUGHIGH

## 2023-09-29 NOTE — PATIENT INSTRUCTIONS
-Continue methimazole 10 mg daily (TWO of 5 mg tablets)  -Continue propranolol 160 mg once daily  -Labs in 4 weeks  -Return for a follow-up visit in 3-4 months  -We will communicate results via [employer], or if needed by phone

## 2023-09-29 NOTE — LETTER
9/29/2023         RE: Yusuf Stubbs  [address] [place] [address]        Dear Colleague,    Thank you for referring your patient, Yusuf Stubbs, to the [facility]. Please see a copy of my visit note below.      ENDOCRINOLOGY FOLLOW-UP         HISTORY OF PRESENT ILLNESS    Yusuf Stubbs is seen in follow-up.    The patient had induction of labor due to hypertension and delivered a baby boy by spontaneous vaginal delivery on 8/16/2023.    Baby had hyperthyroidism after delivery and was treated with a course of methimazole.    She was discharged on usual regimen of methimazole 7.5 mg daily.    Also taking propranolol extended release 160 mg daily.    After her previsit labs a week ago showed hyperthyroidism I asked patient to increase methimazole to 10 mg daily and she has made this change for the last week.  Feels fatigued but otherwise no tremors or palpitations.      Her eyes sometimes feel itchy, otherwise no redness, injection or vision changes.    Not breast-feeding.    As before, she hopes to start OCP and plans to discuss this with her PCP at visit in 10/2023.    She also remains interested in radioiodine therapy and prefers to defer surgery for hyperthyroidism: Her first dose of radioactive iodine was given while she had infants at home and she had family members help care for her children.    Pertinent endocrine and related history:  1.  Hypothyroidism, due to Graves' disease.  Was initially diagnosed with hyperthyroidism secondary to Graves Disease in 2016. She had been pregnant at the time. She was started on methimazole 15 mg daily. At the time of initial presentation she had been experiencing weight loss of 20 pounds, palpitations, heat intolerance and anxiety.   -She followed with endocrinology and was treated through another pregnancy in 2017.  She recalls that both of the children she had with hyperthyroidism during pregnancy ultimately needed treatment with methimazole  in infancy.    -Radioactive iodine ablation was contemplated (initially deferred due to concern for worsening of thyroid eye disease) and ultimately pursued due to patient preference: Radioactive iodine uptake scan revealed an 80% uptake. She underwent radioactive iodine ablation on 2019 and received 14.6 mCi.   -She was last seen by Endocrinology at [facility] in  by Dr. Sharma at which time the patient was prescribed methimazole (the medication was resumed due to persistent hyperthyroidism after radioiodine therapy).  2.  History of thyroid related eye disease.  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    PAST MEDICAL HISTORY  Past Medical History:   Diagnosis Date     Endocrine disease      Hypertension      Hyperthyroidism 2016     Lab test positive for detection of COVID-19 virus 2021     Normal delivery 2022     Precipitous delivery, delivered (current hospitalization) 2016      delivery 2016     Unspecified fetal growth retardation, unspecified (weight) 2013     Urinary tract infection        MEDICATIONS  Current Outpatient Medications   Medication Sig Dispense Refill     docusate sodium (COLACE) 100 MG capsule Take 1 capsule (100 mg) by mouth daily 30 capsule 0     ibuprofen (ADVIL/MOTRIN) 800 MG tablet Take 1 tablet (800 mg) by mouth every 6 hours as needed for other (cramping) 30 tablet 0     methimazole (TAPAZOLE) 5 MG tablet Take 1.5 tablets (7.5 mg) by mouth daily 135 tablet 1     propranolol ER (INDERAL LA) 160 MG 24 hr capsule Take 1 capsule (160 mg) by mouth daily 90 capsule 1     triamcinolone (KENALOG) 0.1 % external cream Apply topically 2 times daily 30 g 0       Allergies, family, and social history were reviewed and documented as needed in EHR.     REVIEW OF SYSTEMS  A focused ROS was performed, with pertinent positives and negatives as noted in the  HPI.    PHYSICAL EXAM  BP (!) 140/82 (BP Location: Right arm, Patient Position: Sitting, Cuff Size: Adult Small)   Pulse 96   Wt 47.6 kg (105 lb)   LMP 11/15/2022 (Within Weeks)   BMI 22.72 kg/m    Body mass index is 22.72 kg/m .  Constitutional: Vital signs reviewed, as recorded above. Patient is alert, oriented and appears in no acute distress.  Eyes: Mild prominence of the eyes, left greater than right; no stare or lid lag or retraction, no conjunctival injection.    Neck: Neck supple, thyroid about 1.5 times the size of normal, without palpable nodules.  Mild tenderness on exam today.  Cardiovascular: Regular rate and rhythm, normal rate, no lower extremity edema.  Respiratory: CTAB, without wheezes, crackles or rhonchi; normal chest wall motion and respiratory effort.  MSK: No clubbing or cyanosis; normal muscle bulk and tone.  Skin: Normal skin color, temperature, turgor and texture.  Neurological: Alert and oriented times 3. No tremor.    DATA REVIEW  Each of the following laboratory and/or imaging studies were reviewed.    Component      Latest Ref Rng 9/19/2023  10:40 AM   Protein Total      6.4 - 8.3 g/dL 6.8    Albumin      3.5 - 5.2 g/dL 3.8    Bilirubin Total      <=1.2 mg/dL 0.2    Alkaline Phosphatase      35 - 104 U/L 170 (H)    AST      0 - 45 U/L 32    ALT      0 - 50 U/L 19    Bilirubin Direct      0.00 - 0.30 mg/dL <0.20    TSH      0.30 - 4.20 uIU/mL <0.01 (L)    T4 Free      0.90 - 1.70 ng/dL 2.87 (H)    Triiodothyronine (T3)      85 - 202 ng/dL 141       Legend:  (H) High  (L) Low      ASSESSMENT  1.  Hyperthyroidism.  Due to Graves' disease.  Treated with PTU and then methimazole during her recent pregnancy; hyperthyroidism has persisted after delivery and we adjusted methimazole dose recently.  She has concerns that higher doses of methimazole may make her feel unwell so we will continue 10 mg daily dose for now.  We will recheck thyroid function tests in 4 weeks and adjust as needed.   She would benefit from definitive therapy: We discussed long-term treatment options at last visit and she is not interested in surgery.  She is interested in radioiodine but since she has a  at home and is still bonding with him, we will defer radioiodine therapy for the next several months.  She does have family members who can help with childcare while she is on isolation precautions after radioiodine therapy once we pursue this in the future.  Ideally, I would like to move forward with definitive therapy before any pregnancies in the future.  She has history of thyroid eye disease but appears to have chronic inactive eye disease, if we contemplate radioiodine therapy would need to check with ophthalmology whether steroids may be of any benefit in this scenario.    2.  Graves' disease.  Seen in ophthalmology.  Lateral orbital decompression was contemplated but the patient had deferred surgery for the short-term.  TSI and TSH receptor antibody were drawn in 2023: Both positive.      3.  History of tobacco use.  Has quit tobacco use.        PLAN  -Continue methimazole 10 mg daily (TWO of 5 mg tablets)  -Continue propranolol 160 mg once daily  -Labs in 4 weeks  -Return for a follow-up visit in 3-4 months  -We will communicate results via [facility], or if needed by phone    Orders Placed This Encounter   Procedures     TSH     T4 free     T3 total     Hepatic function panel         Cortez Echols MD   Division of Diabetes, Endocrinology and Metabolism  Department of Medicine          Again, thank you for allowing me to participate in the care of your patient.        Sincerely,        CORTEZ Echols MD

## 2023-10-17 ENCOUNTER — PRENATAL OFFICE VISIT (OUTPATIENT)
Dept: FAMILY MEDICINE | Facility: CLINIC | Age: 28
End: 2023-10-17
Payer: COMMERCIAL

## 2023-10-17 ENCOUNTER — MEDICAL CORRESPONDENCE (OUTPATIENT)
Dept: HEALTH INFORMATION MANAGEMENT | Facility: CLINIC | Age: 28
End: 2023-10-17

## 2023-10-17 VITALS
SYSTOLIC BLOOD PRESSURE: 130 MMHG | DIASTOLIC BLOOD PRESSURE: 89 MMHG | HEART RATE: 71 BPM | HEIGHT: 57 IN | WEIGHT: 108 LBS | BODY MASS INDEX: 23.3 KG/M2 | OXYGEN SATURATION: 97 % | TEMPERATURE: 97.9 F | RESPIRATION RATE: 18 BRPM

## 2023-10-17 DIAGNOSIS — E05.90 HYPERTHYROIDISM: ICD-10-CM

## 2023-10-17 DIAGNOSIS — Z12.4 SCREENING FOR CERVICAL CANCER: ICD-10-CM

## 2023-10-17 DIAGNOSIS — Z30.017 INSERTION OF IMPLANTABLE SUBDERMAL CONTRACEPTIVE: ICD-10-CM

## 2023-10-17 PROCEDURE — 99207 PR POST PARTUM EXAM: CPT | Performed by: FAMILY MEDICINE

## 2023-10-17 PROCEDURE — G0145 SCR C/V CYTO,THINLAYER,RESCR: HCPCS | Performed by: FAMILY MEDICINE

## 2023-10-17 PROCEDURE — 11981 INSERTION DRUG DLVR IMPLANT: CPT | Performed by: FAMILY MEDICINE

## 2023-10-17 ASSESSMENT — EDINBURGH POSTNATAL DEPRESSION SCALE (EPDS)
I HAVE FELT SAD OR MISERABLE: NO, NOT AT ALL
I HAVE BEEN SO UNHAPPY THAT I HAVE BEEN CRYING: NO, NEVER
I HAVE BEEN ABLE TO LAUGH AND SEE THE FUNNY SIDE OF THINGS: AS MUCH AS I ALWAYS COULD
I HAVE BEEN SO UNHAPPY THAT I HAVE HAD DIFFICULTY SLEEPING: NOT AT ALL
I HAVE LOOKED FORWARD WITH ENJOYMENT TO THINGS: AS MUCH AS I EVER DID
TOTAL SCORE: 0
THINGS HAVE BEEN GETTING ON TOP OF ME: NO, I HAVE BEEN COPING AS WELL AS EVER
I HAVE FELT SCARED OR PANICKY FOR NO GOOD REASON: NO, NOT AT ALL
THE THOUGHT OF HARMING MYSELF HAS OCCURRED TO ME: NEVER
I HAVE BEEN ANXIOUS OR WORRIED FOR NO GOOD REASON: NO, NOT AT ALL
I HAVE BLAMED MYSELF UNNECESSARILY WHEN THINGS WENT WRONG: NO, NEVER

## 2023-10-17 NOTE — PROGRESS NOTES
"Nexplanon Insertion:    Is a pregnancy test required: No.  Was a consent obtained?  Yes    Subjective: Yusuf Stubbs is a 28 year old  presents for Nexplanon.    Patient has been given the opportunity to ask questions about all forms of birth control, including all options appropriate for Yusuf Stubbs. Discussed that no method of birth control, except abstinence is 100% effective against pregnancy or sexually transmitted infection.     Yusuf Stubbs understands she may have the Nexplanon removed at any time and it should be removed by a health care provider.    The entire insertion procedure was reviewed with the patient, including care after placement.    Patient's last menstrual period was 11/15/2022 (within weeks).   Post partum.  Not sexually active since delivery.  No allergy to betadine or shellfish.     /89 (BP Location: Right arm, Patient Position: Sitting, Cuff Size: Adult Regular)   Pulse 71   Temp 97.9  F (36.6  C) (Temporal)   Resp 18   Ht 1.448 m (4' 9\")   Wt 49 kg (108 lb)   LMP 11/15/2022 (Within Weeks)   SpO2 97%   Breastfeeding No   BMI 23.37 kg/m      PROCEDURE NOTE: -- Nexplanon Insertion    Reason for Insertion: contraception    Patient was placed supine with left arm exposed.  Clementina was made 8-10 cm above medial epicondyle and a guiding clementina 4 cm above the first.  Arm was prepped with Betadine. Insertion point was anesthetized with 4 mL 1% lidocaine. After stretching the skin with thumb and index finger around the insertion site, skin punctured with the tip of the needle inserted at 30 degrees and then lowered to horizontal position. The needle was then advanced to its full length. Applicator was then stabilized and slider was unlocked. Slider was pulled back until it stopped and then removed.    Correct placement of the implant was confirmed by palpation in the patient's arm and visualizing the purple top of the obturator.   Bandage and pressure dressing applied to insertion " site.    Lot # [id number]  Exp: 9/1/25    EBL: minimal    Complications: none    ASSESSMENT:     ICD-10-CM    1. Screening for cervical cancer  Z12.4 Pap screen reflex to HPV if ASCUS - recommend age 25 - 29      2. Insertion of implantable subdermal contraceptive  Z30.017 etonogestrel (NEXPLANON) subdermal implant 68 mg     INSERTION NON-BIODEGRADABLE DRUG DELIVERY IMPLANT             PLAN:    Given 's handouts, including when to have Nexplanon removed, list of danger s/sx, side effects and follow up recommended. Encouraged condom use for prevention of STD. Back up contraception advised for 7 days. Advised to call for any fever, for prolonged or severe pain or bleeding, abnormal vaginal dischage. She was advised to use pain medications (ibuprofen) as needed for mild to moderate pain.     Victorino White MD

## 2023-10-17 NOTE — PATIENT INSTRUCTIONS
NEXPLANON AFTERCARE INSTRUCTIONS   Keep dressing on and dry for 24 hours, then remove wrap.  Replace bandaid daily for 5 days. Keep your user card in a safe place where you'll remember it.    You may have some pain at the site of the Nexplanon insertion. You can help relieve the discomfort with Tylenol (acetaminophen), Aspirin or Advil (ibuprofen). If your discomfort worsens or you notice redness spreading on the skin around the insertion site, please call the clinic.     Irregular bleeding is common with Nexplanon, especially in the first 6-12 months of use. After one year, approximately 20% of women who use Nexplanon will stop having periods completely. Some women have longer, heavier periods. Some women will have increased spotting between periods. You may find that your periods may be hard to predict.     The Nexplanon does not protect against sexually transmitted infections including the AIDS virus (HIV), warts (HPV), gonorrhea, Chlamydia, and herpes. Condoms should be used to decrease the risk sexually transmitted infections. If you think that you have been exposed to a sexually transmitted infection, please call the clinic.     If you had Nexplanon placed for birth control, it is effective immediately if it was inserted within five days after the start of your period. If you have Nexplanon inserted at any other time during your menstrual cycle, use another method of birth control, like condoms for at least 7 days.     The Nexplanon should be removed and/or replaced by a health care provider after three years.   Warning Signs   Call the clinic if any of the following occurs:     You have bleeding, pus, or increasing redness, or pain at insertion site.     You have fever or chills     The implant comes out or you have concerns about its location.     You have a positive pregnancy test or suspect you might be pregnant.

## 2023-10-17 NOTE — PROGRESS NOTES
"Post Partum Check:    Delivery at 37w0d now .  Date of delivery: 2023    Patient concerns: none    Bleeding: no concerns    Pain: no concerns    LMP:Patient's last menstrual period was 11/15/2022 (within weeks).     Depression: no concerns  EPDS Score: 0    Contraception Plan:  Nexplanon    Immunizations needed:  She declined flu and covid vaccines  Also hep B    /89 (BP Location: Right arm, Patient Position: Sitting, Cuff Size: Adult Regular)   Pulse 71   Temp 97.9  F (36.6  C) (Temporal)   Resp 18   Ht 1.448 m (4' 9\")   Wt 49 kg (108 lb)   LMP 11/15/2022 (Within Weeks)   SpO2 97%   Breastfeeding No   BMI 23.37 kg/m     GENERAL: alert, not distressed  CHEST: clear, no rales, rhonchi, or wheezes  CARDIAC: regular without murmur, gallop, or rub  ABDOMEN: soft, non tender, non distended, normal bowel sounds  : normal vulva, vaginal mucosa, cervix.    Pap smear:  Done today    Assessment:  Well post partum check up.    Plan:  Yusuf was seen today for post partum exam.    Diagnoses and all orders for this visit:    Routine postpartum follow-up    Screening for cervical cancer  -     Pap screen reflex to HPV if ASCUS - recommend age 25 - 29  -     HPV Hold (Lab Only)    Insertion of implantable subdermal contraceptive  -     etonogestrel (NEXPLANON) subdermal implant 68 mg  -     INSERTION NON-BIODEGRADABLE DRUG DELIVERY IMPLANT    Hyperthyroidism  Continue meds and follow up with endocrinology      "

## 2023-10-18 PROBLEM — Z36.89 ENCOUNTER FOR TRIAGE IN PREGNANT PATIENT: Status: RESOLVED | Noted: 2023-08-15 | Resolved: 2023-10-18

## 2023-10-18 PROBLEM — O99.280 HYPERTHYROIDISM AFFECTING PREGNANCY: Status: RESOLVED | Noted: 2017-01-06 | Resolved: 2023-10-18

## 2023-10-18 PROBLEM — O09.90 SUPERVISION OF HIGH RISK PREGNANCY, ANTEPARTUM: Status: RESOLVED | Noted: 2022-05-24 | Resolved: 2023-10-18

## 2023-10-18 PROBLEM — E05.90 HYPERTHYROIDISM AFFECTING PREGNANCY: Status: RESOLVED | Noted: 2017-01-06 | Resolved: 2023-10-18

## 2023-10-19 LAB
BKR LAB AP GYN ADEQUACY: NORMAL
BKR LAB AP GYN INTERPRETATION: NORMAL
BKR LAB AP HPV REFLEX: NORMAL
BKR LAB AP PREVIOUS ABNORMAL: NORMAL
PATH REPORT.COMMENTS IMP SPEC: NORMAL
PATH REPORT.COMMENTS IMP SPEC: NORMAL
PATH REPORT.RELEVANT HX SPEC: NORMAL

## 2024-01-31 ENCOUNTER — OFFICE VISIT (OUTPATIENT)
Dept: ENDOCRINOLOGY | Facility: CLINIC | Age: 29
End: 2024-01-31
Payer: COMMERCIAL

## 2024-01-31 ENCOUNTER — LAB (OUTPATIENT)
Dept: LAB | Facility: CLINIC | Age: 29
End: 2024-01-31
Payer: COMMERCIAL

## 2024-01-31 VITALS
SYSTOLIC BLOOD PRESSURE: 120 MMHG | DIASTOLIC BLOOD PRESSURE: 80 MMHG | BODY MASS INDEX: 24.02 KG/M2 | HEART RATE: 60 BPM | WEIGHT: 111 LBS

## 2024-01-31 DIAGNOSIS — E05.90 HYPERTHYROIDISM: ICD-10-CM

## 2024-01-31 DIAGNOSIS — E05.90 HYPERTHYROIDISM: Primary | ICD-10-CM

## 2024-01-31 DIAGNOSIS — E05.00 GRAVES DISEASE: ICD-10-CM

## 2024-01-31 PROCEDURE — 84480 ASSAY TRIIODOTHYRONINE (T3): CPT

## 2024-01-31 PROCEDURE — 84443 ASSAY THYROID STIM HORMONE: CPT

## 2024-01-31 PROCEDURE — 36415 COLL VENOUS BLD VENIPUNCTURE: CPT

## 2024-01-31 PROCEDURE — 80076 HEPATIC FUNCTION PANEL: CPT

## 2024-01-31 PROCEDURE — 84439 ASSAY OF FREE THYROXINE: CPT

## 2024-01-31 PROCEDURE — 99214 OFFICE O/P EST MOD 30 MIN: CPT | Performed by: INTERNAL MEDICINE

## 2024-01-31 NOTE — LETTER
1/31/2024         RE: Yusuf Stubbs  [address] [place] [address]        Dear Colleague,    Thank you for referring your patient, Yusuf Stubbs, to the [facility]. Please see a copy of my visit note below.      ENDOCRINOLOGY FOLLOW-UP         HISTORY OF PRESENT ILLNESS    Yusuf Stubbs is seen in follow-up.    The patient is accompanied by her young son, Toni.  Toni has been off methimazole and has been growing well.    Ms. Stubbs is not breast-feeding.    She continues on methimazole 10 mg (two 5 mg tablets) each day.    Also taking propranolol 160 mg daily.    She has been feeling fairly good: No marked change in energy level.  She has noted more heat intolerance.  No tremors or palpitations.    Eyes still have intermittent itchiness.  Otherwise, no redness, irritation or diplopia.    She is now on contraception and does not plan to conceive in the future.    We discussed long-term treatment options last visit: Although she has had interest in radioiodine therapy (prefers to defer surgery), she was not sure that she would be able to practice precautions after radioiodine therapy since she has young children at home.  We therefore continued methimazole at her last visit in 9/2023.    Pertinent endocrine and related history:  1.  Hypothyroidism, due to Graves' disease.  Was initially diagnosed with hyperthyroidism secondary to Graves Disease in 2016. She had been pregnant at the time. She was started on methimazole 15 mg daily. At the time of initial presentation she had been experiencing weight loss of 20 pounds, palpitations, heat intolerance and anxiety.   -She followed with endocrinology and was treated through another pregnancy in 2017.  She recalls that both of the children she had with hyperthyroidism during pregnancy ultimately needed treatment with methimazole in infancy.    -Radioactive iodine ablation was contemplated (initially deferred due to concern for worsening of  thyroid eye disease) and ultimately pursued due to patient preference: Radioactive iodine uptake scan revealed an 80% uptake. She underwent radioactive iodine ablation on 2019 and received 14.6 mCi.   -She was last seen by Endocrinology at [facility] in  by Dr. Sharma at which time the patient was prescribed methimazole (the medication was resumed due to persistent hyperthyroidism after radioiodine therapy).  2.  History of thyroid related eye disease.  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    PAST MEDICAL HISTORY  Past Medical History:   Diagnosis Date     Endocrine disease      Hypertension      Hyperthyroidism 2016     Lab test positive for detection of COVID-19 virus 2021     Normal delivery 2022     Precipitous delivery, delivered (current hospitalization) 2016      delivery 2016     Unspecified fetal growth retardation, unspecified (weight) 2013     Urinary tract infection        MEDICATIONS  Current Outpatient Medications   Medication Sig Dispense Refill     etonogestrel (NEXPLANON) 68 MG IMPL 1 each (68 mg) by Subdermal route once       ibuprofen (ADVIL/MOTRIN) 800 MG tablet Take 1 tablet (800 mg) by mouth every 6 hours as needed for other (cramping) 30 tablet 0     methimazole (TAPAZOLE) 5 MG tablet Take 2 tablets (10 mg) by mouth daily 60 tablet 4     propranolol ER (INDERAL LA) 160 MG 24 hr capsule Take 1 capsule (160 mg) by mouth daily 90 capsule 1       Allergies, family, and social history were reviewed and documented as needed in EHR.     REVIEW OF SYSTEMS  A focused ROS was performed, with pertinent positives and negatives as noted in the HPI.    PHYSICAL EXAM  /80 (BP Location: Right arm, Patient Position: Sitting, Cuff Size: Adult Regular)   Pulse 60   Wt 50.3 kg (111 lb)   BMI 24.02 kg/m    Body mass index is 24.02 kg/m .  Constitutional:  Vital signs reviewed, as recorded above. Patient is alert, oriented and appears in no acute distress.  Eyes: Mild prominence of the eyes, left greater than right; no stare or lid lag or retraction, no conjunctival injection.    Neck: Neck supple, thyroid about 1.5 times the size of normal (perhaps slightly more diminutive on today's exam), without palpable nodules.    Cardiovascular: Regular rate and rhythm, normal rate, no lower extremity edema.  MSK: No clubbing or cyanosis; normal muscle bulk and tone.  Skin: Normal skin color, temperature, turgor and texture.  Neurological: Alert and oriented times 3. No tremor.    DATA REVIEW  Each of the following laboratory and/or imaging studies were reviewed.    Component      Latest Ref Rn 9/19/2023  10:40 AM   Protein Total      6.4 - 8.3 g/dL 6.8    Albumin      3.5 - 5.2 g/dL 3.8    Bilirubin Total      <=1.2 mg/dL 0.2    Alkaline Phosphatase      35 - 104 U/L 170 (H)    AST      0 - 45 U/L 32    ALT      0 - 50 U/L 19    Bilirubin Direct      0.00 - 0.30 mg/dL <0.20    TSH      0.30 - 4.20 uIU/mL <0.01 (L)    T4 Free      0.90 - 1.70 ng/dL 2.87 (H)    Triiodothyronine (T3)      85 - 202 ng/dL 141       Legend:  (H) High  (L) Low      ASSESSMENT  1.  Hyperthyroidism.    ~Due to Graves' disease.  Treated with PTU and then methimazole during her last pregnancy; continues on methimazole postpartum.  Clinically appears euthyroid, although she has noted slightly more heat intolerance.  We will check thyroid function tests today and adjust methimazole dose as needed.  ~We discussed definitive therapy at last visit in 9/2023 and rediscussed today.  She does not desire surgery.  Would consider radioiodine therapy, but presently does not have ability to take safety precautions after radioiodine exposure/treatment since she has young children at home.  We can consider arranging for this at future visit: If we move forward with radioiodine therapy, may need to check with  ophthalmology whether steroids may be of benefit: At present appears to have chronic inactive disease.  ~No plans for conception in the future: Now on contraception.  We have discussed avoiding pregnancy while on methimazole.  I have asked her to update me if she ever suspects pregnancy in the future.    2.  Graves' disease.  Seen in ophthalmology.  Lateral orbital decompression was contemplated but the patient had deferred surgery for the short-term.     3.  History of tobacco use.  Has quit tobacco use.        PLAN  -Labs today  -Continue methimazole 10 mg daily (TWO of 5 mg tablets)--I will send refill once results are available  -Continue propranolol 160 mg once daily  -Return for a follow-up visit Fall 2024--contact me sooner if ever suspecting pregnancy  -We will communicate results via [employer], or if needed by phone      Cortez Echols MD   Division of Diabetes, Endocrinology and Metabolism  Department of Medicine          Again, thank you for allowing me to participate in the care of your patient.        Sincerely,        CORTEZ Echols MD   01-Jan-2022 01:16

## 2024-01-31 NOTE — PROGRESS NOTES
ENDOCRINOLOGY FOLLOW-UP         HISTORY OF PRESENT ILLNESS    Yusuf Stubbs is seen in follow-up.    The patient is accompanied by her young son, Toni.  Toni has been off methimazole and has been growing well.    Ms. Stubbs is not breast-feeding.    She continues on methimazole 10 mg (two 5 mg tablets) each day.    Also taking propranolol 160 mg daily.    She has been feeling fairly good: No marked change in energy level.  She has noted more heat intolerance.  No tremors or palpitations.    Eyes still have intermittent itchiness.  Otherwise, no redness, irritation or diplopia.    She is now on contraception and does not plan to conceive in the future.    We discussed long-term treatment options last visit: Although she has had interest in radioiodine therapy (prefers to defer surgery), she was not sure that she would be able to practice precautions after radioiodine therapy since she has young children at home.  We therefore continued methimazole at her last visit in 9/2023.    Pertinent endocrine and related history:  1.  Hypothyroidism, due to Graves' disease.  Was initially diagnosed with hyperthyroidism secondary to Graves Disease in 2016. She had been pregnant at the time. She was started on methimazole 15 mg daily. At the time of initial presentation she had been experiencing weight loss of 20 pounds, palpitations, heat intolerance and anxiety.   -She followed with endocrinology and was treated through another pregnancy in 2017.  She recalls that both of the children she had with hyperthyroidism during pregnancy ultimately needed treatment with methimazole in infancy.    -Radioactive iodine ablation was contemplated (initially deferred due to concern for worsening of thyroid eye disease) and ultimately pursued due to patient preference: Radioactive iodine uptake scan revealed an 80% uptake. She underwent radioactive iodine ablation on 04/05/2019 and received 14.6 mCi.   -She was last seen by Endocrinology at  [facility] in 2019 by Dr. Sharma at which time the patient was prescribed methimazole (the medication was resumed due to persistent hyperthyroidism after radioiodine therapy).  2.  History of thyroid related eye disease.  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    PAST MEDICAL HISTORY  Past Medical History:   Diagnosis Date    Endocrine disease     Hypertension     Hyperthyroidism 2016    Lab test positive for detection of COVID-19 virus 2021    Normal delivery 2022    Precipitous delivery, delivered (current hospitalization) 2016     delivery 2016    Unspecified fetal growth retardation, unspecified (weight)     Urinary tract infection        MEDICATIONS  Current Outpatient Medications   Medication Sig Dispense Refill    etonogestrel (NEXPLANON) 68 MG IMPL 1 each (68 mg) by Subdermal route once      ibuprofen (ADVIL/MOTRIN) 800 MG tablet Take 1 tablet (800 mg) by mouth every 6 hours as needed for other (cramping) 30 tablet 0    methimazole (TAPAZOLE) 5 MG tablet Take 2 tablets (10 mg) by mouth daily 60 tablet 4    propranolol ER (INDERAL LA) 160 MG 24 hr capsule Take 1 capsule (160 mg) by mouth daily 90 capsule 1       Allergies, family, and social history were reviewed and documented as needed in EHR.     REVIEW OF SYSTEMS  A focused ROS was performed, with pertinent positives and negatives as noted in the HPI.    PHYSICAL EXAM  /80 (BP Location: Right arm, Patient Position: Sitting, Cuff Size: Adult Regular)   Pulse 60   Wt 50.3 kg (111 lb)   BMI 24.02 kg/m    Body mass index is 24.02 kg/m .  Constitutional: Vital signs reviewed, as recorded above. Patient is alert, oriented and appears in no acute distress.  Eyes: Mild prominence of the eyes, left greater than right; no stare or lid lag or retraction, no conjunctival injection.    Neck: Neck supple, thyroid about  1.5 times the size of normal (perhaps slightly more diminutive on today's exam), without palpable nodules.    Cardiovascular: Regular rate and rhythm, normal rate, no lower extremity edema.  MSK: No clubbing or cyanosis; normal muscle bulk and tone.  Skin: Normal skin color, temperature, turgor and texture.  Neurological: Alert and oriented times 3. No tremor.    DATA REVIEW  Each of the following laboratory and/or imaging studies were reviewed.    Component      Latest Ref Rng 9/19/2023  10:40 AM   Protein Total      6.4 - 8.3 g/dL 6.8    Albumin      3.5 - 5.2 g/dL 3.8    Bilirubin Total      <=1.2 mg/dL 0.2    Alkaline Phosphatase      35 - 104 U/L 170 (H)    AST      0 - 45 U/L 32    ALT      0 - 50 U/L 19    Bilirubin Direct      0.00 - 0.30 mg/dL <0.20    TSH      0.30 - 4.20 uIU/mL <0.01 (L)    T4 Free      0.90 - 1.70 ng/dL 2.87 (H)    Triiodothyronine (T3)      85 - 202 ng/dL 141       Legend:  (H) High  (L) Low      ASSESSMENT  1.  Hyperthyroidism.    ~Due to Graves' disease.  Treated with PTU and then methimazole during her last pregnancy; continues on methimazole postpartum.  Clinically appears euthyroid, although she has noted slightly more heat intolerance.  We will check thyroid function tests today and adjust methimazole dose as needed.  ~We discussed definitive therapy at last visit in 9/2023 and rediscussed today.  She does not desire surgery.  Would consider radioiodine therapy, but presently does not have ability to take safety precautions after radioiodine exposure/treatment since she has young children at home.  We can consider arranging for this at future visit: If we move forward with radioiodine therapy, may need to check with ophthalmology whether steroids may be of benefit: At present appears to have chronic inactive disease.  ~No plans for conception in the future: Now on contraception.  We have discussed avoiding pregnancy while on methimazole.  I have asked her to update me if she ever  suspects pregnancy in the future.    2.  Graves' disease.  Seen in ophthalmology.  Lateral orbital decompression was contemplated but the patient had deferred surgery for the short-term.     3.  History of tobacco use.  Has quit tobacco use.        PLAN  -Labs today  -Continue methimazole 10 mg daily (TWO of 5 mg tablets)--I will send refill once results are available  -Continue propranolol 160 mg once daily  -Return for a follow-up visit Fall 2024--contact me sooner if ever suspecting pregnancy  -We will communicate results via [employer], or if needed by phone      Matthew Echols MD   Division of Diabetes, Endocrinology and Metabolism  Department of Medicine

## 2024-01-31 NOTE — PATIENT INSTRUCTIONS
-Labs today  -Continue methimazole 10 mg daily (TWO of 5 mg tablets)--I will send refill once results are available  -Continue propranolol 160 mg once daily  -Return for a follow-up visit Fall 2024--contact me sooner if ever suspecting pregnancy  -We will communicate results via [employer], or if needed by phone

## 2024-02-01 LAB
ALBUMIN SERPL BCG-MCNC: 4.4 G/DL (ref 3.5–5.2)
ALP SERPL-CCNC: 188 U/L (ref 40–150)
ALT SERPL W P-5'-P-CCNC: 21 U/L (ref 0–50)
AST SERPL W P-5'-P-CCNC: 28 U/L (ref 0–45)
BILIRUB DIRECT SERPL-MCNC: <0.2 MG/DL (ref 0–0.3)
BILIRUB SERPL-MCNC: 0.8 MG/DL
PROT SERPL-MCNC: 7.8 G/DL (ref 6.4–8.3)
T3 SERPL-MCNC: 228 NG/DL (ref 85–202)
T4 FREE SERPL-MCNC: 3.99 NG/DL (ref 0.9–1.7)
TSH SERPL DL<=0.005 MIU/L-ACNC: <0.01 UIU/ML (ref 0.3–4.2)

## 2024-02-10 DIAGNOSIS — E05.90 HYPERTHYROIDISM: Primary | ICD-10-CM

## 2024-02-10 RX ORDER — METHIMAZOLE 5 MG/1
20 TABLET ORAL DAILY
Qty: 120 TABLET | Refills: 3 | Status: SHIPPED | OUTPATIENT
Start: 2024-02-10 | End: 2024-05-19

## 2024-03-09 ENCOUNTER — MYC REFILL (OUTPATIENT)
Dept: FAMILY MEDICINE | Facility: CLINIC | Age: 29
End: 2024-03-09
Payer: COMMERCIAL

## 2024-03-09 DIAGNOSIS — E05.00 GRAVES DISEASE: ICD-10-CM

## 2024-03-09 DIAGNOSIS — E05.90 HYPERTHYROIDISM: ICD-10-CM

## 2024-03-12 RX ORDER — PROPRANOLOL HYDROCHLORIDE 160 MG/1
160 CAPSULE, EXTENDED RELEASE ORAL DAILY
Qty: 90 CAPSULE | Refills: 1 | Status: SHIPPED | OUTPATIENT
Start: 2024-03-12

## 2024-04-12 ENCOUNTER — MYC MEDICAL ADVICE (OUTPATIENT)
Dept: ENDOCRINOLOGY | Facility: CLINIC | Age: 29
End: 2024-04-12
Payer: COMMERCIAL

## 2024-04-12 NOTE — TELEPHONE ENCOUNTER
Labs overdue, MyChart reminder to have lab draw sent to the patient.    Matthew Echols MD 4/12/2024 4:58 PM

## 2024-05-04 ENCOUNTER — LAB (OUTPATIENT)
Dept: LAB | Facility: CLINIC | Age: 29
End: 2024-05-04
Payer: COMMERCIAL

## 2024-05-04 DIAGNOSIS — E05.90 HYPERTHYROIDISM: ICD-10-CM

## 2024-05-04 LAB
ALBUMIN SERPL BCG-MCNC: 4.1 G/DL (ref 3.5–5.2)
ALP SERPL-CCNC: 175 U/L (ref 40–150)
ALT SERPL W P-5'-P-CCNC: 21 U/L (ref 0–50)
AST SERPL W P-5'-P-CCNC: 26 U/L (ref 0–45)
BILIRUB DIRECT SERPL-MCNC: 0.22 MG/DL (ref 0–0.3)
BILIRUB SERPL-MCNC: 0.7 MG/DL
PROT SERPL-MCNC: 6.9 G/DL (ref 6.4–8.3)
T3 SERPL-MCNC: 271 NG/DL (ref 85–202)
T4 FREE SERPL-MCNC: 4.66 NG/DL (ref 0.9–1.7)

## 2024-05-04 PROCEDURE — 84480 ASSAY TRIIODOTHYRONINE (T3): CPT

## 2024-05-04 PROCEDURE — 36415 COLL VENOUS BLD VENIPUNCTURE: CPT

## 2024-05-04 PROCEDURE — 80076 HEPATIC FUNCTION PANEL: CPT

## 2024-05-04 PROCEDURE — 84439 ASSAY OF FREE THYROXINE: CPT

## 2024-05-06 ENCOUNTER — MYC MEDICAL ADVICE (OUTPATIENT)
Dept: ENDOCRINOLOGY | Facility: CLINIC | Age: 29
End: 2024-05-06
Payer: COMMERCIAL

## 2024-05-06 DIAGNOSIS — E05.00 GRAVES DISEASE: ICD-10-CM

## 2024-05-06 DIAGNOSIS — E05.90 HYPERTHYROIDISM: Primary | ICD-10-CM

## 2024-05-17 NOTE — TELEPHONE ENCOUNTER
I called the pt, she was taking the medication consistently, however she lost her medication for 1 week, which was right before she had her labs. The pt is taking the medication again, 20 mg daily, consistently.

## 2024-05-19 ENCOUNTER — MYC REFILL (OUTPATIENT)
Dept: ENDOCRINOLOGY | Facility: CLINIC | Age: 29
End: 2024-05-19
Payer: COMMERCIAL

## 2024-05-19 DIAGNOSIS — E05.90 HYPERTHYROIDISM: ICD-10-CM

## 2024-05-20 RX ORDER — METHIMAZOLE 5 MG/1
20 TABLET ORAL DAILY
Qty: 120 TABLET | Refills: 3 | Status: SHIPPED | OUTPATIENT
Start: 2024-05-20 | End: 2024-06-19 | Stop reason: DRUGHIGH

## 2024-05-20 NOTE — TELEPHONE ENCOUNTER
I called the pt and informed of the below. The pt declined to schedule labs at this time. She will schedule herself.

## 2024-05-20 NOTE — TELEPHONE ENCOUNTER
Thank you for calling patient: We will not change dose since she was most recently not taking medication regularly.    I would like her to take methimazole 20 mg daily consistently and schedule lab draw in 1 month to recheck thyroid function tests.    I have sent a refill for her prescription.

## 2024-05-20 NOTE — TELEPHONE ENCOUNTER
Requested Prescriptions   Pending Prescriptions Disp Refills    methimazole (TAPAZOLE) 5 MG tablet 120 tablet 3     Sig: Take 4 tablets (20 mg) by mouth daily       Anti-Thyroid Agents Protocol Failed - 5/19/2024 11:30 AM        Failed - Normal TSH in past 12 months     Recent Labs   Lab Test 01/31/24  1436   TSH <0.01*              Passed - Medication is active on the patient's med list        Passed - Recent (12 month) or future (90 days) visit with authorizing provider s specialty     The patient must have completed an in-person or virtual visit within the past 12 months or has a future visit scheduled within the next 90 days with the authorizing provider s specialty.  Urgent care and e-visits do not quality as an office visit for this protocol.          Passed - Medication indicated for associated diagnosis     The medication is prescribed for one or more of the following conditions:        Hyperthyroidism       Disorder of thyroid gland       Thyrotoxicosis       Thyroid storm       Thyroid eye disease            Passed - Patient is age 18 or older        Passed - No active pregnancy on record        Passed - No positive pregnancy test in past 12 months

## 2024-06-07 ENCOUNTER — LAB (OUTPATIENT)
Dept: LAB | Facility: CLINIC | Age: 29
End: 2024-06-07
Payer: COMMERCIAL

## 2024-06-07 DIAGNOSIS — E05.90 HYPERTHYROIDISM: ICD-10-CM

## 2024-06-07 DIAGNOSIS — E05.00 GRAVES DISEASE: ICD-10-CM

## 2024-06-07 PROCEDURE — 84439 ASSAY OF FREE THYROXINE: CPT

## 2024-06-07 PROCEDURE — 36415 COLL VENOUS BLD VENIPUNCTURE: CPT

## 2024-06-07 PROCEDURE — 84443 ASSAY THYROID STIM HORMONE: CPT

## 2024-06-07 PROCEDURE — 84480 ASSAY TRIIODOTHYRONINE (T3): CPT

## 2024-06-08 LAB
T3 SERPL-MCNC: 125 NG/DL (ref 85–202)
T4 FREE SERPL-MCNC: 1.17 NG/DL (ref 0.9–1.7)
TSH SERPL DL<=0.005 MIU/L-ACNC: <0.01 UIU/ML (ref 0.3–4.2)

## 2024-06-19 DIAGNOSIS — E05.00 GRAVES DISEASE: ICD-10-CM

## 2024-06-19 DIAGNOSIS — E05.90 HYPERTHYROIDISM: Primary | ICD-10-CM

## 2024-06-19 RX ORDER — METHIMAZOLE 5 MG/1
15 TABLET ORAL DAILY
Qty: 90 TABLET | Refills: 3 | Status: SHIPPED | OUTPATIENT
Start: 2024-06-19

## 2024-07-03 ENCOUNTER — LAB (OUTPATIENT)
Dept: LAB | Facility: CLINIC | Age: 29
End: 2024-07-03
Payer: COMMERCIAL

## 2024-07-03 DIAGNOSIS — E05.90 HYPERTHYROIDISM: ICD-10-CM

## 2024-07-03 DIAGNOSIS — E05.00 GRAVES DISEASE: ICD-10-CM

## 2024-07-03 LAB
T3 SERPL-MCNC: 115 NG/DL (ref 85–202)
T4 FREE SERPL-MCNC: 1.85 NG/DL (ref 0.9–1.7)

## 2024-07-03 PROCEDURE — 84439 ASSAY OF FREE THYROXINE: CPT

## 2024-07-03 PROCEDURE — 84480 ASSAY TRIIODOTHYRONINE (T3): CPT

## 2024-07-03 PROCEDURE — 36415 COLL VENOUS BLD VENIPUNCTURE: CPT

## 2024-07-03 PROCEDURE — 84443 ASSAY THYROID STIM HORMONE: CPT

## 2024-07-04 LAB — TSH SERPL DL<=0.005 MIU/L-ACNC: <0.01 UIU/ML (ref 0.3–4.2)

## 2024-09-25 ENCOUNTER — LAB (OUTPATIENT)
Dept: LAB | Facility: CLINIC | Age: 29
End: 2024-09-25
Payer: COMMERCIAL

## 2024-09-25 DIAGNOSIS — E05.00 GRAVES DISEASE: ICD-10-CM

## 2024-09-25 DIAGNOSIS — E05.90 HYPERTHYROIDISM: ICD-10-CM

## 2024-09-25 LAB
ALBUMIN SERPL BCG-MCNC: 4.3 G/DL (ref 3.5–5.2)
ALP SERPL-CCNC: 247 U/L (ref 40–150)
ALT SERPL W P-5'-P-CCNC: 14 U/L (ref 0–50)
AST SERPL W P-5'-P-CCNC: 24 U/L (ref 0–45)
BILIRUB DIRECT SERPL-MCNC: <0.2 MG/DL (ref 0–0.3)
BILIRUB SERPL-MCNC: 0.4 MG/DL
PROT SERPL-MCNC: 7.6 G/DL (ref 6.4–8.3)
T3 SERPL-MCNC: 178 NG/DL (ref 85–202)
T4 FREE SERPL-MCNC: 2.95 NG/DL (ref 0.9–1.7)
TSH SERPL DL<=0.005 MIU/L-ACNC: <0.01 UIU/ML (ref 0.3–4.2)

## 2024-09-25 PROCEDURE — 84480 ASSAY TRIIODOTHYRONINE (T3): CPT

## 2024-09-25 PROCEDURE — 84439 ASSAY OF FREE THYROXINE: CPT

## 2024-09-25 PROCEDURE — 80076 HEPATIC FUNCTION PANEL: CPT

## 2024-09-25 PROCEDURE — 84443 ASSAY THYROID STIM HORMONE: CPT

## 2024-09-25 PROCEDURE — 36415 COLL VENOUS BLD VENIPUNCTURE: CPT

## 2024-10-02 ENCOUNTER — OFFICE VISIT (OUTPATIENT)
Dept: ENDOCRINOLOGY | Facility: CLINIC | Age: 29
End: 2024-10-02
Payer: COMMERCIAL

## 2024-10-02 VITALS
BODY MASS INDEX: 24.84 KG/M2 | DIASTOLIC BLOOD PRESSURE: 77 MMHG | HEART RATE: 90 BPM | OXYGEN SATURATION: 98 % | SYSTOLIC BLOOD PRESSURE: 111 MMHG | WEIGHT: 114.8 LBS

## 2024-10-02 DIAGNOSIS — E05.90 HYPERTHYROIDISM: Primary | ICD-10-CM

## 2024-10-02 DIAGNOSIS — E05.00 GRAVES DISEASE: ICD-10-CM

## 2024-10-02 PROCEDURE — 99214 OFFICE O/P EST MOD 30 MIN: CPT | Performed by: INTERNAL MEDICINE

## 2024-10-02 NOTE — PATIENT INSTRUCTIONS
-Continue methimazole 15 mg daily (THREE of 5 mg tablets)  -Continue propranolol 160 mg once daily  -Labs in 1 month  -Can contact eye clinic to schedule follow-up appointment: [phone]  -Return for a follow-up visit earliest available--contact me sooner if ever suspecting pregnancy  -We will communicate results via [employer], or if needed by phone

## 2024-10-02 NOTE — PROGRESS NOTES
ENDOCRINOLOGY FOLLOW-UP         HISTORY OF PRESENT ILLNESS    Yusuf Stubbs is seen in follow-up.    The patient is accompanied by her sons.    We have adjusted dose of methimazole in the interim since last visit.  We increased from methimazole 10 mg daily to 20 mg daily in 2/2024 due to progressing hyperthyroidism; we then reduced methimazole to 15 mg daily in 6/2024.    The patient notes that she stopped taking methimazole after experiencing more stress/depressive symptoms related to marital issues.  However, when she saw her follow-up labs drawn prior to this visit (on 9/25/2024) she started taking methimazole more consistently at 15 mg daily.    She is also taking propranolol 160 mg daily.    She endorses fatigue.  Otherwise, no tremors or palpitations.    Menstrual cycles have been occurring irregularly: She is using Nexplanon for contraception and is having spotting/bleeding up to 3 times a month.    Ms. Stubbs is not breast-feeding.    Had some eye redness which has improved; however, has had worsening eye pressure.  Would like to follow-up in ophthalmology.    Pertinent endocrine and related history:  1.  Hypothyroidism, due to Graves' disease.  Was initially diagnosed with hyperthyroidism secondary to Graves Disease in 2016. She had been pregnant at the time. She was started on methimazole 15 mg daily. At the time of initial presentation she had been experiencing weight loss of 20 pounds, palpitations, heat intolerance and anxiety.   -She followed with endocrinology and was treated through another pregnancy in 2017.  She recalls that both of the children she had with hyperthyroidism during pregnancy ultimately needed treatment with methimazole in infancy.    -Radioactive iodine ablation was contemplated (initially deferred due to concern for worsening of thyroid eye disease) and ultimately pursued due to patient preference: Radioactive iodine uptake scan revealed an 80% uptake. She underwent radioactive iodine  ablation on 2019 and received 14.6 mCi.   -She was last seen by Endocrinology at [facility] in  by Dr. Sharma at which time the patient was prescribed methimazole (the medication was resumed due to persistent hyperthyroidism after radioiodine therapy).  2.  History of thyroid related eye disease.  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    PAST MEDICAL HISTORY  Past Medical History:   Diagnosis Date    Endocrine disease     Hypertension     Hyperthyroidism 2016    Lab test positive for detection of COVID-19 virus 2021    Normal delivery 2022    Precipitous delivery, delivered (current hospitalization) 2016     delivery 2016    Unspecified fetal growth retardation, unspecified (weight)     Urinary tract infection        MEDICATIONS  Current Outpatient Medications   Medication Sig Dispense Refill    etonogestrel (NEXPLANON) 68 MG IMPL 1 each (68 mg) by Subdermal route once      ibuprofen (ADVIL/MOTRIN) 800 MG tablet Take 1 tablet (800 mg) by mouth every 6 hours as needed for other (cramping) 30 tablet 0    methimazole (TAPAZOLE) 5 MG tablet Take 3 tablets (15 mg) by mouth daily 90 tablet 3    propranolol ER (INDERAL LA) 160 MG 24 hr capsule Take 1 capsule (160 mg) by mouth daily 90 capsule 1       Allergies, family, and social history were reviewed and documented as needed in EHR.     REVIEW OF SYSTEMS  A focused ROS was performed, with pertinent positives and negatives as noted in the HPI.    PHYSICAL EXAM  /77 (BP Location: Right arm, Patient Position: Sitting, Cuff Size: Adult Regular)   Pulse 90   Wt 52.1 kg (114 lb 12.8 oz)   SpO2 98%   BMI 24.84 kg/m    Body mass index is 24.84 kg/m .  Constitutional: Vital signs reviewed, as recorded above. Patient is alert, oriented and appears in no acute distress.  Eyes: Mild prominence of the eyes, left greater than  right; no stare or lid lag or retraction, no conjunctival injection.    Neck: Neck supple, thyroid about 1.5 times the size of normal, without palpable nodules.    Cardiovascular: Regular rate and rhythm, normal rate, no lower extremity edema.  MSK: No clubbing or cyanosis; normal muscle bulk and tone.  Skin: Normal skin color, temperature, turgor and texture.  Neurological: Alert and oriented times 3. No tremor.    DATA REVIEW  Each of the following laboratory and/or imaging studies were reviewed.          ASSESSMENT  1.  Hyperthyroidism.    ~Due to Graves' disease.  Treated with PTU and then methimazole during her last pregnancy; continues on methimazole postpartum.  Worsening degree of hyperthyroidism on most recent labs drawn prior to this visit, likely due to discontinuation of methimazole.  Has since resumed methimazole 15 mg daily.  We will continue at this dose and reassess thyroid function tests in 1 month.  ~We discussed definitive therapy again today.  The patient does not desire surgery.  She is interested in radioactive iodine therapy but may need to consider this in 2025: At present, she does not have the ability to take safety precautions after a dose of radioactive iodine since she needs to care for her young children at home.  May need to assess activity of eye disease before administering dose of radioactive iodine in the future.  ~No plans for conception in the future: Now on contraception.  At prior visits, we have discussed avoiding pregnancy while on methimazole and also the importance of updating me if she ever suspects pregnancy.    2.  Graves' disease.  Seen in ophthalmology.  Lateral orbital decompression was contemplated but the patient had deferred surgery for the short-term.  She is interested in following up with ophthalmology and I have given her contact information for the eye clinic.    3.  History of tobacco use.  Has quit tobacco use.      4.  Menstrual spotting/irregular cycles.   May be related to Nexplanon.  But, thyroid status could be influencing regularity of cycles also.  Addressed thyroid status as above.  If menstrual irregularity persists despite improvement in thyroid function tests, I have asked patient to follow-up in primary care to rediscuss contraception options.    PLAN  -Continue methimazole 15 mg daily (THREE of 5 mg tablets)  -Continue propranolol 160 mg once daily  -Labs in 1 month  -Can contact eye clinic to schedule follow-up appointment  -Return for a follow-up visit earliest available--contact me sooner if ever suspecting pregnancy  -We will communicate results via [employer], or if needed by phone    Orders Placed This Encounter   Procedures    TSH    T4 free    T3 total    Hepatic function panel       Matthew Echols MD   Division of Diabetes, Endocrinology and Metabolism  Department of Medicine

## 2024-10-02 NOTE — LETTER
10/2/2024      Yusuf Stubbs  [address] [place] [address]      Dear Colleague,    Thank you for referring your patient, Yusuf Stubbs, to the [facility]. Please see a copy of my visit note below.      ENDOCRINOLOGY FOLLOW-UP         HISTORY OF PRESENT ILLNESS    Yusuf Stubbs is seen in follow-up.    The patient is accompanied by her sons.    We have adjusted dose of methimazole in the interim since last visit.  We increased from methimazole 10 mg daily to 20 mg daily in 2/2024 due to progressing hyperthyroidism; we then reduced methimazole to 15 mg daily in 6/2024.    The patient notes that she stopped taking methimazole after experiencing more stress/depressive symptoms related to marital issues.  However, when she saw her follow-up labs drawn prior to this visit (on 9/25/2024) she started taking methimazole more consistently at 15 mg daily.    She is also taking propranolol 160 mg daily.    She endorses fatigue.  Otherwise, no tremors or palpitations.    Menstrual cycles have been occurring irregularly: She is using Nexplanon for contraception and is having spotting/bleeding up to 3 times a month.    Ms. Stubbs is not breast-feeding.    Had some eye redness which has improved; however, has had worsening eye pressure.  Would like to follow-up in ophthalmology.    Pertinent endocrine and related history:  1.  Hypothyroidism, due to Graves' disease.  Was initially diagnosed with hyperthyroidism secondary to Graves Disease in 2016. She had been pregnant at the time. She was started on methimazole 15 mg daily. At the time of initial presentation she had been experiencing weight loss of 20 pounds, palpitations, heat intolerance and anxiety.   -She followed with endocrinology and was treated through another pregnancy in 2017.  She recalls that both of the children she had with hyperthyroidism during pregnancy ultimately needed treatment with methimazole in infancy.    -Radioactive iodine  ablation was contemplated (initially deferred due to concern for worsening of thyroid eye disease) and ultimately pursued due to patient preference: Radioactive iodine uptake scan revealed an 80% uptake. She underwent radioactive iodine ablation on 2019 and received 14.6 mCi.   -She was last seen by Endocrinology at [facility] in  by Dr. Sharma at which time the patient was prescribed methimazole (the medication was resumed due to persistent hyperthyroidism after radioiodine therapy).  2.  History of thyroid related eye disease.  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    PAST MEDICAL HISTORY  Past Medical History:   Diagnosis Date     Endocrine disease      Hypertension      Hyperthyroidism 2016     Lab test positive for detection of COVID-19 virus 2021     Normal delivery 2022     Precipitous delivery, delivered (current hospitalization) 2016      delivery 2016     Unspecified fetal growth retardation, unspecified (weight)      Urinary tract infection        MEDICATIONS  Current Outpatient Medications   Medication Sig Dispense Refill     etonogestrel (NEXPLANON) 68 MG IMPL 1 each (68 mg) by Subdermal route once       ibuprofen (ADVIL/MOTRIN) 800 MG tablet Take 1 tablet (800 mg) by mouth every 6 hours as needed for other (cramping) 30 tablet 0     methimazole (TAPAZOLE) 5 MG tablet Take 3 tablets (15 mg) by mouth daily 90 tablet 3     propranolol ER (INDERAL LA) 160 MG 24 hr capsule Take 1 capsule (160 mg) by mouth daily 90 capsule 1       Allergies, family, and social history were reviewed and documented as needed in EHR.     REVIEW OF SYSTEMS  A focused ROS was performed, with pertinent positives and negatives as noted in the HPI.    PHYSICAL EXAM  /77 (BP Location: Right arm, Patient Position: Sitting, Cuff Size: Adult Regular)   Pulse 90   Wt 52.1 kg (114  lb 12.8 oz)   SpO2 98%   BMI 24.84 kg/m    Body mass index is 24.84 kg/m .  Constitutional: Vital signs reviewed, as recorded above. Patient is alert, oriented and appears in no acute distress.  Eyes: Mild prominence of the eyes, left greater than right; no stare or lid lag or retraction, no conjunctival injection.    Neck: Neck supple, thyroid about 1.5 times the size of normal, without palpable nodules.    Cardiovascular: Regular rate and rhythm, normal rate, no lower extremity edema.  MSK: No clubbing or cyanosis; normal muscle bulk and tone.  Skin: Normal skin color, temperature, turgor and texture.  Neurological: Alert and oriented times 3. No tremor.    DATA REVIEW  Each of the following laboratory and/or imaging studies were reviewed.          ASSESSMENT  1.  Hyperthyroidism.    ~Due to Graves' disease.  Treated with PTU and then methimazole during her last pregnancy; continues on methimazole postpartum.  Worsening degree of hyperthyroidism on most recent labs drawn prior to this visit, likely due to discontinuation of methimazole.  Has since resumed methimazole 15 mg daily.  We will continue at this dose and reassess thyroid function tests in 1 month.  ~We discussed definitive therapy again today.  The patient does not desire surgery.  She is interested in radioactive iodine therapy but may need to consider this in 2025: At present, she does not have the ability to take safety precautions after a dose of radioactive iodine since she needs to care for her young children at home.  May need to assess activity of eye disease before administering dose of radioactive iodine in the future.  ~No plans for conception in the future: Now on contraception.  At prior visits, we have discussed avoiding pregnancy while on methimazole and also the importance of updating me if she ever suspects pregnancy.    2.  Graves' disease.  Seen in ophthalmology.  Lateral orbital decompression was contemplated but the patient had  deferred surgery for the short-term.  She is interested in following up with ophthalmology and I have given her contact information for the eye clinic.    3.  History of tobacco use.  Has quit tobacco use.      4.  Menstrual spotting/irregular cycles.  May be related to Nexplanon.  But, thyroid status could be influencing regularity of cycles also.  Addressed thyroid status as above.  If menstrual irregularity persists despite improvement in thyroid function tests, I have asked patient to follow-up in primary care to rediscuss contraception options.    PLAN  -Continue methimazole 15 mg daily (THREE of 5 mg tablets)  -Continue propranolol 160 mg once daily  -Labs in 1 month  -Can contact eye clinic to schedule follow-up appointment  -Return for a follow-up visit earliest available--contact me sooner if ever suspecting pregnancy  -We will communicate results via [employer], or if needed by phone    Orders Placed This Encounter   Procedures     TSH     T4 free     T3 total     Hepatic function panel       Matthew Echols MD   Division of Diabetes, Endocrinology and Metabolism  Department of Medicine          Again, thank you for allowing me to participate in the care of your patient.        Sincerely,        Matthew Echols MD

## 2024-10-20 ENCOUNTER — HEALTH MAINTENANCE LETTER (OUTPATIENT)
Age: 29
End: 2024-10-20

## 2024-11-04 ENCOUNTER — LAB (OUTPATIENT)
Dept: LAB | Facility: CLINIC | Age: 29
End: 2024-11-04
Payer: COMMERCIAL

## 2024-11-04 DIAGNOSIS — E05.00 GRAVES DISEASE: ICD-10-CM

## 2024-11-04 DIAGNOSIS — E05.90 HYPERTHYROIDISM: ICD-10-CM

## 2024-11-04 LAB
ALBUMIN SERPL BCG-MCNC: 4.4 G/DL (ref 3.5–5.2)
ALP SERPL-CCNC: 249 U/L (ref 40–150)
ALT SERPL W P-5'-P-CCNC: 10 U/L (ref 0–50)
AST SERPL W P-5'-P-CCNC: 20 U/L (ref 0–45)
BILIRUB DIRECT SERPL-MCNC: <0.2 MG/DL (ref 0–0.3)
BILIRUB SERPL-MCNC: 0.6 MG/DL
PROT SERPL-MCNC: 7.6 G/DL (ref 6.4–8.3)
T3 SERPL-MCNC: 263 NG/DL (ref 85–202)
T4 FREE SERPL-MCNC: 3.68 NG/DL (ref 0.9–1.7)
TSH SERPL DL<=0.005 MIU/L-ACNC: <0.01 UIU/ML (ref 0.3–4.2)

## 2024-11-04 PROCEDURE — 36415 COLL VENOUS BLD VENIPUNCTURE: CPT

## 2024-11-04 PROCEDURE — 84439 ASSAY OF FREE THYROXINE: CPT

## 2024-11-04 PROCEDURE — 84480 ASSAY TRIIODOTHYRONINE (T3): CPT

## 2024-11-04 PROCEDURE — 80076 HEPATIC FUNCTION PANEL: CPT

## 2024-11-04 PROCEDURE — 84443 ASSAY THYROID STIM HORMONE: CPT

## 2024-11-06 ENCOUNTER — MYC MEDICAL ADVICE (OUTPATIENT)
Dept: ENDOCRINOLOGY | Facility: CLINIC | Age: 29
End: 2024-11-06
Payer: COMMERCIAL

## 2024-11-06 DIAGNOSIS — E05.90 HYPERTHYROIDISM: Primary | ICD-10-CM

## 2024-11-06 DIAGNOSIS — E05.00 GRAVES DISEASE: ICD-10-CM

## 2024-11-06 RX ORDER — METHIMAZOLE 5 MG/1
20 TABLET ORAL DAILY
Qty: 120 TABLET | Refills: 3 | Status: SHIPPED | OUTPATIENT
Start: 2024-11-06

## 2024-12-08 DIAGNOSIS — E05.90 HYPERTHYROIDISM: ICD-10-CM

## 2024-12-08 DIAGNOSIS — E05.00 GRAVES DISEASE: ICD-10-CM

## 2024-12-08 RX ORDER — PROPRANOLOL HYDROCHLORIDE 160 MG/1
CAPSULE, EXTENDED RELEASE ORAL
Qty: 30 CAPSULE | Refills: 0 | Status: SHIPPED | OUTPATIENT
Start: 2024-12-08

## 2025-03-22 DIAGNOSIS — E05.00 GRAVES DISEASE: ICD-10-CM

## 2025-03-22 DIAGNOSIS — E05.90 HYPERTHYROIDISM: ICD-10-CM

## 2025-03-23 RX ORDER — PROPRANOLOL HYDROCHLORIDE 160 MG/1
CAPSULE, EXTENDED RELEASE ORAL
Qty: 30 CAPSULE | Refills: 0 | Status: SHIPPED | OUTPATIENT
Start: 2025-03-23

## 2025-04-24 ENCOUNTER — LAB (OUTPATIENT)
Dept: LAB | Facility: CLINIC | Age: 30
End: 2025-04-24
Payer: COMMERCIAL

## 2025-04-24 DIAGNOSIS — E05.90 HYPERTHYROIDISM: ICD-10-CM

## 2025-04-24 DIAGNOSIS — E05.00 GRAVES DISEASE: ICD-10-CM

## 2025-04-30 ENCOUNTER — OFFICE VISIT (OUTPATIENT)
Dept: ENDOCRINOLOGY | Facility: CLINIC | Age: 30
End: 2025-04-30
Payer: COMMERCIAL

## 2025-04-30 VITALS
SYSTOLIC BLOOD PRESSURE: 122 MMHG | DIASTOLIC BLOOD PRESSURE: 78 MMHG | WEIGHT: 130 LBS | HEART RATE: 72 BPM | BODY MASS INDEX: 28.13 KG/M2

## 2025-04-30 DIAGNOSIS — E05.00 GRAVES DISEASE: ICD-10-CM

## 2025-04-30 DIAGNOSIS — E05.90 HYPERTHYROIDISM: ICD-10-CM

## 2025-04-30 PROCEDURE — 3074F SYST BP LT 130 MM HG: CPT | Performed by: INTERNAL MEDICINE

## 2025-04-30 PROCEDURE — 99214 OFFICE O/P EST MOD 30 MIN: CPT | Performed by: INTERNAL MEDICINE

## 2025-04-30 PROCEDURE — 3078F DIAST BP <80 MM HG: CPT | Performed by: INTERNAL MEDICINE

## 2025-04-30 RX ORDER — METHIMAZOLE 5 MG/1
15 TABLET ORAL DAILY
Qty: 90 TABLET | Refills: 5 | Status: SHIPPED | OUTPATIENT
Start: 2025-04-30

## 2025-04-30 RX ORDER — PROPRANOLOL HYDROCHLORIDE 160 MG/1
160 CAPSULE, EXTENDED RELEASE ORAL DAILY
Qty: 30 CAPSULE | Refills: 5 | Status: SHIPPED | OUTPATIENT
Start: 2025-04-30

## 2025-04-30 NOTE — PROGRESS NOTES
ENDOCRINOLOGY FOLLOW-UP         HISTORY OF PRESENT ILLNESS    Yusuf Stubbs is seen in follow-up.    We planned to continue methimazole 15 mg daily at our last visit in 10/2024.  After follow-up thyroid function tests showed hyperthyroidism in 11/2024, I asked patient to increase methimazole to 15 mg daily.  She confirms she continues on this dose.    She has also continued on propranolol 160 mg daily: Finds this is beneficial and would like to continue this even if euthyroid.    She has been feeling generally well.    She was experiencing frequent and irregular menstrual bleeding on Nexplanon at our last visit: Now experiencing less menstrual bleeding.    Has noticed increased redness of her eyes which she suspects is due to allergies.  No vision changes.    Pertinent endocrine and related history:  1.  Hypothyroidism, due to Graves' disease.  Was initially diagnosed with hyperthyroidism secondary to Graves Disease in 2016. She had been pregnant at the time. She was started on methimazole 15 mg daily. At the time of initial presentation she had been experiencing weight loss of 20 pounds, palpitations, heat intolerance and anxiety.   -She followed with endocrinology and was treated through another pregnancy in 2017.  She recalls that both of the children she had with hyperthyroidism during pregnancy ultimately needed treatment with methimazole in infancy.    -Radioactive iodine ablation was contemplated (initially deferred due to concern for worsening of thyroid eye disease) and ultimately pursued due to patient preference: Radioactive iodine uptake scan revealed an 80% uptake. She underwent radioactive iodine ablation on 04/05/2019 and received 14.6 mCi.   -She was last seen by Endocrinology at [facility] in 2019 by Dr. Sharma at which time the patient was prescribed methimazole (the medication was resumed due to persistent hyperthyroidism after radioiodine therapy).  2.  History of thyroid related eye  disease.  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    PAST MEDICAL HISTORY  Past Medical History:   Diagnosis Date    Endocrine disease     Hypertension     Hyperthyroidism 2016    Lab test positive for detection of COVID-19 virus 2021    Normal delivery 2022    Precipitous delivery, delivered (current hospitalization) 2016     delivery 2016    Unspecified fetal growth retardation, unspecified (weight)     Urinary tract infection        MEDICATIONS  Current Outpatient Medications   Medication Sig Dispense Refill    etonogestrel (NEXPLANON) 68 MG IMPL 1 each (68 mg) by Subdermal route once      methimazole (TAPAZOLE) 5 MG tablet Take 3 tablets (15 mg) by mouth daily. 90 tablet 5    propranolol ER (INDERAL LA) 160 MG 24 hr capsule Take 1 capsule (160 mg) by mouth daily. 30 capsule 5    ibuprofen (ADVIL/MOTRIN) 800 MG tablet Take 1 tablet (800 mg) by mouth every 6 hours as needed for other (cramping) 30 tablet 0       Allergies, family, and social history were reviewed and documented as needed in EHR.     REVIEW OF SYSTEMS  A focused ROS was performed, with pertinent positives and negatives as noted in the HPI.    PHYSICAL EXAM  /78 (BP Location: Right arm, Patient Position: Sitting, Cuff Size: Adult Regular)   Pulse 72   Wt 59 kg (130 lb)   BMI 28.13 kg/m    Body mass index is 28.13 kg/m .  Constitutional: Vital signs reviewed, as recorded above. Patient is alert, oriented and appears in no acute distress.  Eyes: Prominence of the eyes, left eye prominence is more pronounced than last visit and greater than right eye; no chemosis.  Cardiovascular: Regular rate and rhythm, normal rate.  MSK: No clubbing or cyanosis; normal muscle bulk and tone.  Skin: Normal skin color, temperature, turgor and texture.  Neurological: Alert and oriented times 3. No tremor.    DATA  REVIEW  Each of the following laboratory and/or imaging studies were reviewed.          ASSESSMENT  1.  Hyperthyroidism.    ~Due to Graves' disease.  Treated with PTU and then methimazole during her last pregnancy; continues on methimazole postpartum.  Previsit labs show improving thyroid function tests: Would reduce methimazole back to 15 mg daily.   ~We had discussed definitive therapy at our last visit in 10/2024.  The patient would like to avoid surgery and is interested in radioactive iodine; we deferred radioactive iodine at our last visit since she did not think she would have the ability to take safety precautions post radioiodine therapy as she needs to care for her young children at home.  Today, she notes that she may have more support with childcare since her  is not presently working.  We have therefore discussed that she will schedule follow-up in ophthalmology to ensure eye exam is stable; if so, we can consider moving forward with radioactive iodine therapy but I would consider a course of steroids to minimize risk of progression of eye disease (would also likely consult with her ophthalmologist).  ~No plans for conception in the future: Now on contraception.   ~Prefers to continue propranolol: Blood pressure is normal, as is pulse, on propranolol while euthyroid.  We will therefore continue without changes.    2.  Graves' disease.  Se previously seen en in ophthalmology.  Lateral orbital decompression was contemplated but the patient had deferred surgery for the short-term.  Have encouraged follow-up with ophthalmology, especially if we are contemplating radioactive iodine therapy.    3.  History of tobacco use.  Has quit tobacco use.      PLAN  -Reduce methimazole to 15 mg daily (THREE of 5 mg tablets)  -Continue propranolol 160 mg once daily  -Labs in 6 weeks  -Please contact eye clinic to schedule follow-up appointment  -Return for a follow-up visit in 6 months--contact me sooner if ever  suspecting pregnancy  -We will communicate results via MyChart, or if needed by phone    Orders Placed This Encounter   Procedures    TSH    T4 free       Matthew Echols MD   Division of Diabetes, Endocrinology and Metabolism  Department of Medicine

## 2025-04-30 NOTE — LETTER
4/30/2025      Yusuf Stubbs  [address] [place] [address]      Dear Colleague,    Thank you for referring your patient, Yusuf Stubbs, to the [facility]. Please see a copy of my visit note below.      ENDOCRINOLOGY FOLLOW-UP         HISTORY OF PRESENT ILLNESS    Yusuf Stubbs is seen in follow-up.    We planned to continue methimazole 15 mg daily at our last visit in 10/2024.  After follow-up thyroid function tests showed hyperthyroidism in 11/2024, I asked patient to increase methimazole to 15 mg daily.  She confirms she continues on this dose.    She has also continued on propranolol 160 mg daily: Finds this is beneficial and would like to continue this even if euthyroid.    She has been feeling generally well.    She was experiencing frequent and irregular menstrual bleeding on Nexplanon at our last visit: Now experiencing less menstrual bleeding.    Has noticed increased redness of her eyes which she suspects is due to allergies.  No vision changes.    Pertinent endocrine and related history:  1.  Hypothyroidism, due to Graves' disease.  Was initially diagnosed with hyperthyroidism secondary to Graves Disease in 2016. She had been pregnant at the time. She was started on methimazole 15 mg daily. At the time of initial presentation she had been experiencing weight loss of 20 pounds, palpitations, heat intolerance and anxiety.   -She followed with endocrinology and was treated through another pregnancy in 2017.  She recalls that both of the children she had with hyperthyroidism during pregnancy ultimately needed treatment with methimazole in infancy.    -Radioactive iodine ablation was contemplated (initially deferred due to concern for worsening of thyroid eye disease) and ultimately pursued due to patient preference: Radioactive iodine uptake scan revealed an 80% uptake. She underwent radioactive iodine ablation on 04/05/2019 and received 14.6 mCi.   -She was last seen by  Endocrinology at [facility] in 2019 by Dr. Sharma at which time the patient was prescribed methimazole (the medication was resumed due to persistent hyperthyroidism after radioiodine therapy).  2.  History of thyroid related eye disease.  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    PAST MEDICAL HISTORY  Past Medical History:   Diagnosis Date     Endocrine disease      Hypertension      Hyperthyroidism 2016     Lab test positive for detection of COVID-19 virus 2021     Normal delivery 2022     Precipitous delivery, delivered (current hospitalization) 2016      delivery 2016     Unspecified fetal growth retardation, unspecified (weight)      Urinary tract infection        MEDICATIONS  Current Outpatient Medications   Medication Sig Dispense Refill     etonogestrel (NEXPLANON) 68 MG IMPL 1 each (68 mg) by Subdermal route once       methimazole (TAPAZOLE) 5 MG tablet Take 3 tablets (15 mg) by mouth daily. 90 tablet 5     propranolol ER (INDERAL LA) 160 MG 24 hr capsule Take 1 capsule (160 mg) by mouth daily. 30 capsule 5     ibuprofen (ADVIL/MOTRIN) 800 MG tablet Take 1 tablet (800 mg) by mouth every 6 hours as needed for other (cramping) 30 tablet 0       Allergies, family, and social history were reviewed and documented as needed in EHR.     REVIEW OF SYSTEMS  A focused ROS was performed, with pertinent positives and negatives as noted in the HPI.    PHYSICAL EXAM  /78 (BP Location: Right arm, Patient Position: Sitting, Cuff Size: Adult Regular)   Pulse 72   Wt 59 kg (130 lb)   BMI 28.13 kg/m    Body mass index is 28.13 kg/m .  Constitutional: Vital signs reviewed, as recorded above. Patient is alert, oriented and appears in no acute distress.  Eyes: Prominence of the eyes, left eye prominence is more pronounced than last visit and greater than right eye; no  chemosis.  Cardiovascular: Regular rate and rhythm, normal rate.  MSK: No clubbing or cyanosis; normal muscle bulk and tone.  Skin: Normal skin color, temperature, turgor and texture.  Neurological: Alert and oriented times 3. No tremor.    DATA REVIEW  Each of the following laboratory and/or imaging studies were reviewed.          ASSESSMENT  1.  Hyperthyroidism.    ~Due to Graves' disease.  Treated with PTU and then methimazole during her last pregnancy; continues on methimazole postpartum.  Previsit labs show improving thyroid function tests: Would reduce methimazole back to 15 mg daily.   ~We had discussed definitive therapy at our last visit in 10/2024.  The patient would like to avoid surgery and is interested in radioactive iodine; we deferred radioactive iodine at our last visit since she did not think she would have the ability to take safety precautions post radioiodine therapy as she needs to care for her young children at home.  Today, she notes that she may have more support with childcare since her  is not presently working.  We have therefore discussed that she will schedule follow-up in ophthalmology to ensure eye exam is stable; if so, we can consider moving forward with radioactive iodine therapy but I would consider a course of steroids to minimize risk of progression of eye disease (would also likely consult with her ophthalmologist).  ~No plans for conception in the future: Now on contraception.   ~Prefers to continue propranolol: Blood pressure is normal, as is pulse, on propranolol while euthyroid.  We will therefore continue without changes.    2.  Graves' disease.  Se previously seen en in ophthalmology.  Lateral orbital decompression was contemplated but the patient had deferred surgery for the short-term.  Have encouraged follow-up with ophthalmology, especially if we are contemplating radioactive iodine therapy.    3.  History of tobacco use.  Has quit tobacco use.       PLAN  -Reduce methimazole to 15 mg daily (THREE of 5 mg tablets)  -Continue propranolol 160 mg once daily  -Labs in 6 weeks  -Please contact eye clinic to schedule follow-up appointment  -Return for a follow-up visit in 6 months--contact me sooner if ever suspecting pregnancy  -We will communicate results via [employer], or if needed by phone    Orders Placed This Encounter   Procedures     TSH     T4 free       Matthew Echols MD   Division of Diabetes, Endocrinology and Metabolism  Department of Medicine          Again, thank you for allowing me to participate in the care of your patient.        Sincerely,        Matthew Echols MD    Electronically signed

## 2025-04-30 NOTE — PATIENT INSTRUCTIONS
-Reduce methimazole to 15 mg daily (THREE of 5 mg tablets)  -Continue propranolol 160 mg once daily  -Labs in 6 weeks  -Please contact eye clinic to schedule follow-up appointment  -Return for a follow-up visit in 6 months--contact me sooner if ever suspecting pregnancy  -We will communicate results via [employer], or if needed by phone

## 2025-05-21 ENCOUNTER — TELEPHONE (OUTPATIENT)
Dept: OPHTHALMOLOGY | Facility: CLINIC | Age: 30
End: 2025-05-21
Payer: COMMERCIAL

## 2025-05-21 NOTE — TELEPHONE ENCOUNTER
"Spoke with patient regarding rescheduling \"[facility]\"  appointment with  due to provider is out of clinic. Rescheduled patient for 6/3//25 and patient will see appointment in [facility].-Per Patient  "

## 2025-06-03 ENCOUNTER — OFFICE VISIT (OUTPATIENT)
Dept: OPHTHALMOLOGY | Facility: CLINIC | Age: 30
End: 2025-06-03
Attending: OPHTHALMOLOGY
Payer: COMMERCIAL

## 2025-06-03 ENCOUNTER — TELEPHONE (OUTPATIENT)
Dept: OPHTHALMOLOGY | Facility: CLINIC | Age: 30
End: 2025-06-03
Payer: COMMERCIAL

## 2025-06-03 DIAGNOSIS — E05.00 PROPTOSIS DUE TO THYROID DISORDER: Primary | ICD-10-CM

## 2025-06-03 PROCEDURE — G0463 HOSPITAL OUTPT CLINIC VISIT: HCPCS | Performed by: OPHTHALMOLOGY

## 2025-06-03 PROCEDURE — 99213 OFFICE O/P EST LOW 20 MIN: CPT | Mod: GC | Performed by: OPHTHALMOLOGY

## 2025-06-03 PROCEDURE — 92285 EXTERNAL OCULAR PHOTOGRAPHY: CPT | Performed by: OPHTHALMOLOGY

## 2025-06-03 ASSESSMENT — CONF VISUAL FIELD
METHOD: COUNTING FINGERS
OS_INFERIOR_TEMPORAL_RESTRICTION: 0
OS_SUPERIOR_NASAL_RESTRICTION: 0
OS_SUPERIOR_TEMPORAL_RESTRICTION: 0
OD_SUPERIOR_TEMPORAL_RESTRICTION: 0
OS_INFERIOR_NASAL_RESTRICTION: 0
OD_INFERIOR_TEMPORAL_RESTRICTION: 0
OD_SUPERIOR_NASAL_RESTRICTION: 0
OD_NORMAL: 1
OD_INFERIOR_NASAL_RESTRICTION: 0
OS_NORMAL: 1

## 2025-06-03 ASSESSMENT — CUP TO DISC RATIO
OD_RATIO: 0.4
OS_RATIO: 0.4

## 2025-06-03 ASSESSMENT — VISUAL ACUITY
OD_CC+: -1
OS_PH_CC: 20/20
OS_CC: 20/30
METHOD: SNELLEN - LINEAR
OD_CC: 20/20

## 2025-06-03 ASSESSMENT — LAGOPHTHALMOS
OD_LAGOPHTHALMOS: 2
OS_LAGOPHTHALMOS: 3

## 2025-06-03 ASSESSMENT — TONOMETRY
OS_IOP_MMHG: 13
IOP_METHOD: ICARE
OD_IOP_MMHG: 12

## 2025-06-03 ASSESSMENT — REFRACTION_WEARINGRX
OS_CYLINDER: +1.00
OD_AXIS: 085
OS_AXIS: 092
OD_SPHERE: -1.75
OS_SPHERE: -1.00
OD_CYLINDER: +3.00

## 2025-06-03 ASSESSMENT — MARGIN REFLEX DISTANCE
OS_MRD1: 5.5
OD_MRD1: 5

## 2025-06-03 ASSESSMENT — EXTERNAL EXAM - RIGHT EYE: OD_EXAM: PROPTOSIS

## 2025-06-03 NOTE — PROGRESS NOTES
"     HPI 6/3/25: Patient elected not to proceed with orbital decompression in 2022 as a result of her pregnancy and obligations at home with five children. Patient started wearing glasses (no prism) in 2024. Desires to repeat radioactive iodine, endocrinologist said that she needed a [facility] clinic visit prior to proceeding. Remains on methimazole @ 15mg daily. No new eye/vision symptoms.    HPI (Initial 6/2022): Patient diagnosed with hyperthyroidism in 2016 due to significant weight loss (40lb in 2 months), voice changes (shaky), breathing heavily, and was started on methimazole at the time. In June 2016 she started noticing prominence of both eyes, blurry vision. She notices intermittent transient horizontal diplopia in the mornings when she is not focused but it resolves with blinking.    Referring physician: Dr. Echols (endocrinology)    Thyroid history:  Diagnosed when? 2016   MCDONOUGH: 2019 14.6 mCi  Thyroidectomy: n/a    TSI (date):4.8 (1/25/22)     Previous results:none  TSH: < 0.01    Eye symptoms (since when):   Proptosis (better/worse/same since last visit): yes since 2016   Diplopia(better/worse/same since last visit): better  Eyelid retraction(better/worse/same since last visit): better  Tearing(better/worse/same since last visit): Same  Redness (better/worse/same since last visit): Same  Pain ((better/worse/same since last visit): None  Pain to move the eyes (better/worse/same since last visit): \"Pressure\" but no pain  Blurred vision: yes    Ocular history:   Orbital decompression (date, details): no  Strabismus surgery (date, details): no  Eyelid surgery (date, details): no    Exam:   Vianey (base):100     Better/worse same: initial  Strabismus (better/worse/same): initial- intermittent exotropia at near- controlled  Eyelid retraction (better/worse/same): initial    MARC Score:  1. Spontaneous orbital pain.     No  2. Gaze evoked orbital pain.     yes  3. Eyelid swelling due to active thyroid eye " disease  No  4. Eyelid erythema.      No  5. Conjunctival redness due to active thyroid eye disease  Yes  6. Chemosis.        No  7. Inflammation of caruncle OR plica.   No    Patients assessed after follow-up can be scored out of 10 by  including items 8-10.    8. Increase of > 2mm in proptosis.    No   9. Decrease in uniocular excursion in any direction of > 8 . No  10. Decrease of acuity equivalent to 1 Snellen line.  No    MARC SCORE = 2/10    [place] Diplopia Score = 1  0 = no diplopia  1 = intermittent (when tired, upon waking, end of day etc)  2 = inconstant (extreme gazes)  3 = constant    Assessment and plan     1. 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     Today her proptosis measures the same as our last visit back in 2022. She is clear to proceed with radioactive iodine however I would recommend a short course of adjunct prednisone to reduce risk of reactivation of thyroid eye disease.    - Encouraged smoking cessation  - continue thyroid control with endocrinology using methimazole        Lucas Elizabeth MD  Resident Physician, PGY-2  Department of Ophthalmology  Stephany 3, 2025      by others; and I examined the patient myself.  I personally reviewed the relevant tests, images, and reports as documented above.  I formulated and edited as necessary the assessment and plan and discussed the findings and management plan with the patient and family.  I personally reviewed the ophthalmic test(s) associated with this encounter, agree with the interpretation(s) as documented by the resident/fellow, and have edited the corresponding report(s) as necessary.     Stanton Ybarra MD

## 2025-06-03 NOTE — LETTER
"Stpehany 3, 2025    RE: Yusfu Stubbs  : 1995  MRN: [id number]    Dear Providers,    We saw our mutual patient, Yusuf Stubbs, in follow-up in our multi-disciplinary thyroid eye disease clinic recently.  After a thorough history followed by a neuro-ophthalmology, strabismus, and oculoplastics examination, we came to the following conclusions:      Interim history and HPI (6/3/25): Patient elected not to proceed with orbital decompression in  as a result of her pregnancy and obligations at home with five children. Patient started wearing glasses (no prism) in . Desires to repeat radioactive iodine, endocrinologist said that she needed a [facility] clinic visit prior to proceeding. Remains on methimazole @ 15mg daily. No new eye/vision symptoms.    HPI (Initial visit back in 2022): Patient diagnosed with hyperthyroidism in  due to significant weight loss (40lb in 2 months), voice changes (shaky), breathing heavily, and was started on methimazole at the time. In 2016 she started noticing prominence of both eyes, blurry vision. She notices intermittent transient horizontal diplopia in the mornings when she is not focused but it resolves with blinking.    Referring physician: Dr. Echols (endocrinology)    Thyroid history:  Diagnosed when? 2016   MCDONOUGH: 2019 14.6 mCi  Thyroidectomy: n/a    TSI (date):4.8 (22)     Previous results:none  TSH: < 0.01    Eye symptoms (since when):   Proptosis (better/worse/same since last visit): yes since    Diplopia(better/worse/same since last visit): better  Eyelid retraction(better/worse/same since last visit): better  Tearing(better/worse/same since last visit): Same  Redness (better/worse/same since last visit): Same  Pain ((better/worse/same since last visit): None  Pain to move the eyes (better/worse/same since last visit): \"Pressure\" but no pain  Blurred vision: yes    Ocular history:   Orbital decompression (date, details): no  Strabismus surgery (date, details): " no  Eyelid surgery (date, details): no    Exam:   Vianey (base):100     Better/worse same: initial  Strabismus (better/worse/same): initial- intermittent exotropia at near- controlled  Eyelid retraction (better/worse/same): initial    MACR Score:  1. Spontaneous orbital pain.     No  2. Gaze evoked orbital pain.     yes  3. Eyelid swelling due to active thyroid eye disease  No  4. Eyelid erythema.      No  5. Conjunctival redness due to active thyroid eye disease  Yes  6. Chemosis.        No  7. Inflammation of caruncle OR plica.   No    Patients assessed after follow-up can be scored out of 10 by  including items 8-10.    8. Increase of > 2mm in proptosis.    No   9. Decrease in uniocular excursion in any direction of > 8 . No  10. Decrease of acuity equivalent to 1 Snellen line.  No    MARC SCORE = 2/10    Mens[place] Diplopia Score = 1  0 = no diplopia  1 = intermittent (when tired, upon waking, end of day etc)  2 = inconstant (extreme gazes)  3 = constant    Assessment and plan     1. 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     Today her proptosis measures the same as our last visit back in 2022. She is clear to proceed with radioactive iodine however I would recommend a short course of adjunct prednisone to reduce risk of reactivation of thyroid eye disease.    - Encouraged smoking cessation  - continue thyroid control with endocrinology using methimazole      Thank you for trusting us with the care of your patient.  For further exam details, please feel free to contact our office for additional records.  If you wish to contact us directly regarding this patient please email us or give our clinic a call to arrange a phone conversation.    Sincerely,    Stanton  MD Tate  , Neuro-Ophthalmology and Adult Strabismus  Department of Ophthalmology and Visual Neurosciences  [facility]  [email or web address]

## 2025-06-03 NOTE — TELEPHONE ENCOUNTER
Spoke with patient regarding offered earlier appointment today, 6/3/25, rescheduled patient as offered. Patient is aware of time and location.-Per Patient

## 2025-06-27 ENCOUNTER — RESULTS FOLLOW-UP (OUTPATIENT)
Dept: ENDOCRINOLOGY | Facility: CLINIC | Age: 30
End: 2025-06-27

## 2025-07-29 ENCOUNTER — E-VISIT (OUTPATIENT)
Dept: FAMILY MEDICINE | Facility: CLINIC | Age: 30
End: 2025-07-29
Payer: COMMERCIAL

## 2025-07-29 ENCOUNTER — MYC MEDICAL ADVICE (OUTPATIENT)
Dept: FAMILY MEDICINE | Facility: CLINIC | Age: 30
End: 2025-07-29
Payer: COMMERCIAL

## 2025-07-29 DIAGNOSIS — N30.90 BLADDER INFECTION: Primary | ICD-10-CM

## 2025-07-29 PROCEDURE — 99207 PR NON-BILLABLE SERV PER CHARTING: CPT | Performed by: FAMILY MEDICINE

## 2025-07-29 RX ORDER — NITROFURANTOIN 25; 75 MG/1; MG/1
100 CAPSULE ORAL 2 TIMES DAILY
Qty: 10 CAPSULE | Refills: 0 | Status: SHIPPED | OUTPATIENT
Start: 2025-07-29 | End: 2025-08-03

## 2025-07-29 NOTE — TELEPHONE ENCOUNTER
Writer replied to patient via MyChart. Colby advised.    DEMARCO Richardson BSN, PHN, AMB-BC (she/her)  [facility] RN  
FACES

## 2025-07-29 NOTE — PATIENT INSTRUCTIONS
Dear Yusuf Stubbs    After reviewing your responses, we can treat you for a urinary tract infection, which is a common infection of the bladder with bacteria.  This is not a sexually transmitted infection, though urinating immediately after intercourse can help prevent infections.  Drinking lots of fluids is also helpful to clear your current infection and prevent the next one.      I have sent a prescription for antibiotics to your pharmacy to treat this infection.    It is important that you take all of your prescribed medication even if your symptoms are improving after a few doses.  Taking all of your medicine helps prevent the symptoms from returning.     If your symptoms worsen, you develop pain in your back or stomach, develop fevers, or are not improving in 5 days, please contact your primary care provider for an appointment or visit any of our convenient Walk-in or Urgent Care Centers to be seen, which can be found on our website here.    Thanks again for choosing us as your health care partner,    Victorino White MD

## 2025-08-04 ENCOUNTER — MYC REFILL (OUTPATIENT)
Dept: ENDOCRINOLOGY | Facility: CLINIC | Age: 30
End: 2025-08-04
Payer: COMMERCIAL

## 2025-08-04 DIAGNOSIS — E05.90 HYPERTHYROIDISM: ICD-10-CM

## 2025-08-04 DIAGNOSIS — E05.00 GRAVES DISEASE: ICD-10-CM

## 2025-08-07 ENCOUNTER — LAB (OUTPATIENT)
Dept: LAB | Facility: HOSPITAL | Age: 30
End: 2025-08-07
Payer: COMMERCIAL

## 2025-08-07 DIAGNOSIS — E05.90 HYPERTHYROIDISM: ICD-10-CM

## 2025-08-07 DIAGNOSIS — E05.00 GRAVES DISEASE: ICD-10-CM

## 2025-08-07 LAB
T3 SERPL-MCNC: 156 NG/DL (ref 85–202)
T4 FREE SERPL-MCNC: 2.72 NG/DL (ref 0.9–1.7)
TSH SERPL DL<=0.005 MIU/L-ACNC: <0.01 UIU/ML (ref 0.3–4.2)

## 2025-08-07 PROCEDURE — 84439 ASSAY OF FREE THYROXINE: CPT

## 2025-08-07 PROCEDURE — 84443 ASSAY THYROID STIM HORMONE: CPT

## 2025-08-07 PROCEDURE — 84480 ASSAY TRIIODOTHYRONINE (T3): CPT

## 2025-08-07 PROCEDURE — 36415 COLL VENOUS BLD VENIPUNCTURE: CPT

## 2025-08-07 RX ORDER — METHIMAZOLE 5 MG/1
15 TABLET ORAL DAILY
Qty: 30 TABLET | Refills: 0 | Status: SHIPPED | OUTPATIENT
Start: 2025-08-07

## 2025-08-07 RX ORDER — PROPRANOLOL HYDROCHLORIDE 160 MG/1
160 CAPSULE, EXTENDED RELEASE ORAL DAILY
Qty: 10 CAPSULE | Refills: 0 | Status: SHIPPED | OUTPATIENT
Start: 2025-08-07

## 2025-08-10 ENCOUNTER — MYC MEDICAL ADVICE (OUTPATIENT)
Dept: ENDOCRINOLOGY | Facility: CLINIC | Age: 30
End: 2025-08-10
Payer: COMMERCIAL

## 2025-08-10 DIAGNOSIS — E05.90 HYPERTHYROIDISM: Primary | ICD-10-CM

## 2025-08-10 DIAGNOSIS — E05.00 GRAVES DISEASE: ICD-10-CM

## 2025-08-10 RX ORDER — METHIMAZOLE 5 MG/1
15 TABLET ORAL DAILY
Qty: 90 TABLET | Refills: 4 | Status: SHIPPED | OUTPATIENT
Start: 2025-08-10